# Patient Record
Sex: FEMALE | Race: BLACK OR AFRICAN AMERICAN | NOT HISPANIC OR LATINO | URBAN - METROPOLITAN AREA
[De-identification: names, ages, dates, MRNs, and addresses within clinical notes are randomized per-mention and may not be internally consistent; named-entity substitution may affect disease eponyms.]

---

## 2017-10-16 ENCOUNTER — ALLSCRIPTS OFFICE VISIT (OUTPATIENT)
Dept: OTHER | Facility: OTHER | Age: 52
End: 2017-10-16

## 2017-10-16 DIAGNOSIS — Z12.31 ENCOUNTER FOR SCREENING MAMMOGRAM FOR MALIGNANT NEOPLASM OF BREAST: ICD-10-CM

## 2017-10-24 ENCOUNTER — HOSPITAL ENCOUNTER (OUTPATIENT)
Dept: RADIOLOGY | Facility: HOSPITAL | Age: 52
Discharge: HOME | End: 2017-10-24
Payer: COMMERCIAL

## 2017-10-24 DIAGNOSIS — Z12.31 ENCOUNTER FOR SCREENING MAMMOGRAM FOR MALIGNANT NEOPLASM OF BREAST: ICD-10-CM

## 2017-10-24 PROCEDURE — G0202 SCR MAMMO BI INCL CAD: HCPCS

## 2017-10-25 ENCOUNTER — GENERIC CONVERSION - ENCOUNTER (OUTPATIENT)
Dept: OTHER | Facility: OTHER | Age: 52
End: 2017-10-25

## 2018-01-12 NOTE — RESULT NOTES
Verified Results  (1) COMPREHENSIVE METABOLIC PANEL [id number] 87:03RK [facility]     Test Name Result Flag Reference   Glucose, Serum 86 mg/dL  65-99   BUN 15 mg/dL  6-24   Creatinine, Serum 0 92 mg/dL  0 57-1 00   eGFR If NonAfricn Am 73 mL/min/1 73  >59   eGFR If Africn Am 85 mL/min/1 73  >59   BUN/Creatinine Ratio 16  9-23   ALT (SGPT) 10 IU/L  0-32   Albumin, Serum 3 9 g/dL  3 5-5 5   Globulin, Total 2 9 g/dL  1 5-4 5   A/G Ratio 1 3  1 1-2 5   Bilirubin, Total 0 7 mg/dL  0 0-1 2   Alkaline Phosphatase, S 72 IU/L     AST (SGOT) 17 IU/L  0-40   Sodium, Serum 138 mmol/L  136-144   Potassium, Serum 4 1 mmol/L  3 5-5 2   Chloride, Serum 99 mmol/L     Carbon Dioxide, Total 25 mmol/L  18-29   Calcium, Serum 9 2 mg/dL  8 7-10 2   Protein, Total, Serum 6 8 g/dL  6 0-8 5     (1) LIPID PANEL FASTING W DIRECT LDL REFLEX [id number] 09:07AM [facility]     Test Name Result Flag Reference   Cholesterol, Total 220 mg/dL H 100-199   Triglycerides 50 mg/dL  0-149   HDL Cholesterol 87 mg/dL  >39   According to ATP-III Guidelines, HDL-C >59 mg/dL is considered a  negative risk factor for CHD  LDL Cholesterol Calc 123 mg/dL H 0-99     [facility]) Cardiovascular Risk Assessment [id number] 09:07AM Lezu[facility]     Test Name Result Flag Reference   Interpretation Note     Supplement report is available  PDF Image   Discussion/Summary   Naman Esquivel,   Your total cholesterol has increased  We will discuss your lab work at your upcoming visit in December     Dr Diya Wilson

## 2018-01-13 NOTE — RESULT NOTES
Discussion/Summary   Tabitha Bonner,   Your mammogram is normal    Dr Indigo King [id number] 02:52PM [facility] Order Number: [id number]    - Patient Instructions: To schedule this appointment, please contact Central Scheduling at [phone]  Do not wear any perfume, powder, lotion or deodorant on breast or underarm area  Please bring your doctors order, referral (if needed) and insurance information with you on the day of the test  Failure to bring this information may result in this test being rescheduled  Arrive 15 minutes prior to your appointment time to register  On the day of your test, please bring any prior mammogram or breast studies with you that were not performed at a [facility]  Failure to bring prior exams may result in your test needing to be rescheduled  Test Name Result Flag Reference   MAMMO SCREENING BILATERAL W CAD (Report)     Patient History:   Patient is postmenopausal, has history of cancer in the left    breast at age 46, had previous chest radiation therapy at age 46,   had previous chemotherapy at age 46, and has history of cancer    in the left breast at age 47  Malignant excisional biopsy of the left breast, January 1, 2006  Patient's BMI is 28 3  Reason for exam: screening, asymptomatic  Screening     Mammo Screening Bilateral W CAD: October 24, 2017 - Check In #:    [de-identified]   Bilateral MLO and CC view(s) were taken  XCCL view(s) were taken   of the left breast      Technologist: Ovidio Ormond, RTRM   Prior study comparison: October 12, 2016, mammo diagnostic    bilateral W CAD performed at [place] [address]  July 9, 2015, bilateral screening mammogram performed at [place] [address]  January 31, 2014, bilateral    screening mammogram performed at [place] [address]  There are scattered fibroglandular densities   Bilateral digital mammography is performed  No dominant soft tissue mass,    unexplained architectural distortion or suspicious calcifications   are noted  The skin and nipple contours are within normal    limits  Left breast postsurgical scarring distortion is    unchanged  Right breast 6:00 nodule is unchanged  Right breast 12:00 focal    asymmetry is unchanged  No evidence of malignancy  No significant   changes when compared to prior studies  1  No evidence of malignancy  2  No significant change when compared with the prior study  ACR BI-RADSï¾® Assessments: BiRad:2 - Benign     Recommendation:   Routine screening mammogram of both breasts in 1 year  Analyzed by CAD     The patient is scheduled in a reminder system for screening    mammography  8-10% of cancers will be missed on mammography  Management of a    palpable abnormality must be based on clinical grounds  Patients   will be notified of their results via letter from our facility  Accredited by [employer] of [employer]iology and FDA  Transcription Location: [facility] Vital 98: [id number]     Risk Value(s):   Myriad Table: 26 3%, MRS : Based on personal and/or    family history, consideration of hereditary risk assessment may    be warranted     Signed by:   Jeannine Graham MD   10/25/17

## 2018-01-14 NOTE — PROGRESS NOTES
Assessment    1  Encounter for preventive health examination (V70 0) (Z00 00)   2  Never a smoker   3  Screening mammogram, encounter for (V76 12) (Z12 31)   4  Colon cancer screening (V76 51) (Z12 11)    Plan  BMI 33 0-33 9,adult    · Phentermine HCl - 37 5 MG Oral Tablet; 1 every day  Colon cancer screening    · COLONOSCOPY; Status:Active; Requested for:89Cfl0550;    · 1 - Agustín Stokes MD, Bruna Sanchez (Gastroenterology) Co-Management  *  Status: Active   Requested for: [id number]  Care Summary provided  : Yes  Screening mammogram, encounter for    · * MAMMO SCREENING BILATERAL W CAD; Status:Active; Requested for:16Oct2017;     Discussion/Summary  health maintenance visit Currently, she eats a healthy diet and has an inadequate exercise regimen  she is going to her gynecologist regularly  Breast cancer screening: mammogram has been ordered  Blood work declined, it is not covered by her insurance  She is going to return with her daughter for a flu shot  History of Present Illness  HM, Adult Female: The patient is being seen for a health maintenance evaluation  General Health: The patient's health since the last visit is described as good  Immunizations status: up to date  Lifestyle:  She consumes a diverse and healthy diet  She has weight concerns  She does not exercise regularly  She does not use tobacco    Reproductive health: the patient is postmenopausal   She is still getting hot flashes pretty much every day  Screening: Additional History:  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  Review of Systems    Constitutional: No fever, no chills, feels well, no tiredness, no recent weight gain or weight loss  ENT: no complaints of earache, no loss of hearing, no nose bleeds, no nasal discharge, no sore throat, no hoarseness     Cardiovascular: No complaints of slow heart rate, no fast heart rate, no chest pain, no palpitations, no leg claudication, no lower extremity edema  Active Problems    1  BMI 33 0-33 9,adult (V85 33) (Z68 33)   2  Encounter for screening mammogram for breast cancer (V76 12) (Z12 31)   3  History of malignant neoplasm of breast (V10 3) (Z85 3)   4  Premenopausal menorrhagia (627 0) (N92 4)    Past Medical History    · History of Blood pressure elevated (401 9) (I10)   · History of major depression (V11 8) (Z86 59)   · History of malignant neoplasm of breast (V10 3) (Z85 3)   · History of Need for immunization against influenza (V04 81) (Z23)   · History of Need for vaccination for DTP (V06 1) (Z23)   · Screening for cardiovascular condition (V81 2) (Z13 6)   · History of Screening for cardiovascular condition (V81 2) (Z13 6)   · Screening for deficiency anemia (V78 1) (Z13 0)   · Screening for diabetes mellitus (V77 1) (Z13 1)   · Screening for hyperlipidemia (V77 91) (Z13 220)   · History of Screening for hyperlipidemia (V77 91) (Z13 220)   · Screening for thyroid disorder (V77 0) (Z13 29)   · History of Screening for thyroid disorder (V77 0) (Z13 29)    Surgical History    · History of Breast Surgery Lumpectomy   · History of  Section   · History of Gynecologic Services Thermal Endometrial Ablation   · History of Radiation Therapy    Family History  Family History    · Family history of Hypertension (V17 49)    Social History    · Never a smoker    Current Meds   1  No Reported Medications Recorded   2  No Reported Medications Recorded    Allergies    1  Percocet TABS    2  Shellfish    Vitals   Recorded: [id number] 02:59PM Recorded: 55SDK7553 02:44PM   Temperature  97 F   Heart Rate  72   Respiration  16   Systolic 267 130   Diastolic 82 983   Height  5 ft 5 in   Weight  198 lb    BMI Calculated  32 95   BSA Calculated  1 97     Physical Exam    Constitutional   General appearance: No acute distress, well appearing and well nourished      Ears, Nose, Mouth, and Throat   External inspection of ears and nose: Normal     Otoscopic examination: Tympanic membranes translucent with normal light reflex  Canals patent without erythema  Oropharynx: Normal with no erythema, edema, exudate or lesions  Pulmonary   Auscultation of lungs: Clear to auscultation  Cardiovascular   Auscultation of heart: Normal rate and rhythm, normal S1 and S2, no murmurs  Abdomen   Abdomen: Non-tender, no masses  Liver and spleen: No hepatomegaly or splenomegaly  Musculoskeletal   Gait and station: Normal     Neurologic   Cortical function: Normal mental status  Psychiatric   Mood and affect: Normal        Results/Data  PHQ-2 Adult Depression Screening [id number] 02:50PM [facility]     Test Name Result Flag Reference   PHQ-2 Adult Depression Score 0     Over the last two weeks, how often have you been bothered by any of the following problems?   Little interest or pleasure in doing things: Not at all - 0  Feeling down, depressed, or hopeless: Not at all - 0   PHQ-2 Adult Depression Screening Negative         Procedure    Procedure:   Results: 20/15 in both eyes without corrective device, 20/20 in the right eye without corrective device, 20/20 in the left eye without corrective device      Signatures   Electronically signed by : Mireya Hartley DO; Oct 16 2017  5:11PM EST                       (Author)

## 2018-01-17 NOTE — PROGRESS NOTES
Assessment    1  Encounter for preventive health examination (V70 0) (Z00 00)   2  BMI 32 0-32 9,adult (V85 32) (Z68 32)   3  Depression, major (296 20) (F32 9)    Plan  BMI 32 0-32 9,adult    · Begin a limited exercise program ; Status:Complete;   Done: 24MZS7751  Depression, major    · Venlafaxine HCl ER 75 MG Oral Capsule Extended Release 24 Hour; take 1  capsule daily   · Follow-up visit in 1 month Evaluation and Treatment  Follow-up  Status: Hold For -  Scheduling  Requested for: 19GPA7511   · Psychology Follow Up Evaluation and Treatment  Follow-up  Status: Hold For -  Scheduling  Requested for: 25KSC9582  Screening for cardiovascular condition, Screening for hyperlipidemia    · (1) COMPREHENSIVE METABOLIC PANEL; Status:Active; Requested for:09Nov2016;    · (1) LIPID PANEL FASTING W DIRECT LDL REFLEX; Status:Active; Requested  for:09Nov2016;     Discussion/Summary  health maintenance visit healthy adult female Currently, she eats an adequate diet and has an inadequate exercise regimen  cervical cancer screening is current Breast cancer screening: mammogram is current  Colorectal cancer screening: colorectal cancer screening is not indicated  The immunizations are up to date  Advice and education were given regarding weight loss  Possible side effects of new medications were reviewed with the patient/guardian today  Chief Complaint  pt present for CPE  ac/cma      History of Present Illness  HM, Adult Female: The patient is being seen for a health maintenance evaluation  General Health: The patient's health since the last visit is described as good  Immunizations status: up to date  Lifestyle:  She consumes a diverse and healthy diet  She has weight concerns  She does not exercise regularly  She does not use tobacco    Reproductive health: the patient is postmenopausal    Screening:   HPI: She is working at a stressful work environment  Her  is out of work and it has been difficult   she has been gaining weight  Review of Systems    Constitutional: No fever, no chills, feels well, no tiredness, no recent weight gain or weight loss  ENT: no complaints of earache, no loss of hearing, no nose bleeds, no nasal discharge, no sore throat, no hoarseness  Cardiovascular: No complaints of slow heart rate, no fast heart rate, no chest pain, no palpitations, no leg claudication, no lower extremity edema  Respiratory: No complaints of shortness of breath, no wheezing, no cough, no SOB on exertion, no orthopnea, no PND  Musculoskeletal: No complaints of arthralgias, no myalgias, no joint swelling or stiffness, no limb pain or swelling  Active Problems    1  Encounter for screening mammogram for breast cancer (V76 12) (Z12 31)   2  History of malignant neoplasm of breast (V10 3) (Z85 3)   3  Premenopausal menorrhagia (627 0) (N92 4)    Past Medical History    · History of Blood pressure elevated (401 9) (I10)   · History of malignant neoplasm of breast (V10 3) (Z85 3)   · History of Need for immunization against influenza (V04 81) (Z23)   · History of Need for vaccination for DTP (V06 1) (Z23)   · Screening for cardiovascular condition (V81 2) (Z13 6)   · Screening for deficiency anemia (V78 1) (Z13 0)   · Screening for diabetes mellitus (V77 1) (Z13 1)   · Screening for hyperlipidemia (V77 91) (Z13 220)   · Screening for thyroid disorder (V77 0) (Z13 29)   · History of Screening for thyroid disorder (V77 0) (Z13 29)    Surgical History    · History of Breast Surgery Lumpectomy   · History of  Section   · History of Gynecologic Services Thermal Endometrial Ablation   · History of Radiation Therapy    Family History  Family History    · Family history of Hypertension (V17 49)    Social History    · Never a smoker    Current Meds   1  No Reported Medications Recorded    Allergies    1  Percocet TABS    2   Shellfish    Vitals   Recorded: 40JAU1708 51:04RU   Systolic 364   Diastolic 80   Heart Rate 76   Respiration 16   Temperature 96 4 F   Height 5 ft 5 in   Weight 197 lb    BMI Calculated 32 78   BSA Calculated 1 97     Physical Exam    Constitutional   General appearance: No acute distress, well appearing and well nourished  Ears, Nose, Mouth, and Throat   External inspection of ears and nose: Normal     Otoscopic examination: Tympanic membranes translucent with normal light reflex  Canals patent without erythema  Oropharynx: Normal with no erythema, edema, exudate or lesions  Neck   Neck: Supple, symmetric, trachea midline, no masses  Pulmonary   Auscultation of lungs: Clear to auscultation  Cardiovascular   Auscultation of heart: Normal rate and rhythm, normal S1 and S2, no murmurs  Abdomen   Abdomen: Non-tender, no masses  Liver and spleen: No hepatomegaly or splenomegaly  Musculoskeletal   Gait and station: Normal     Neurologic   Reflexes: 2+ and symmetric  Psychiatric   Judgment and insight: Normal     Orientation to person, place, and time: Normal        Results/Data  PHQ-2 Adult Depression Screening [id number] 06:41PM User, Ahs     Test Name Result Flag Reference   PHQ-2 Adult Depression Score 0     Over the last two weeks, how often have you been bothered by any of the following problems? Little interest or pleasure in doing things: Not at all - 0  Feeling down, depressed, or hopeless: Not at all - 0   PHQ-2 Adult Depression Screening Negative         Procedure    Procedure: Visual Acuity Test    Indication: routine screening  Inforrmation supplied by a Snellen chart     Results: 20/15 in both eyes with corrective device, 20/40 in the right eye with corrective device, 20/15 in the left eye with corrective device      Signatures   Electronically signed by : Marleen Sorto DO; Nov 9 2016  6:58PM EST                       (Author)

## 2018-01-22 VITALS
TEMPERATURE: 97 F | RESPIRATION RATE: 16 BRPM | HEART RATE: 72 BPM | WEIGHT: 198 LBS | DIASTOLIC BLOOD PRESSURE: 82 MMHG | SYSTOLIC BLOOD PRESSURE: 131 MMHG | BODY MASS INDEX: 32.99 KG/M2 | HEIGHT: 65 IN

## 2018-03-20 ENCOUNTER — TELEPHONE (OUTPATIENT)
Dept: FAMILY MEDICINE CLINIC | Facility: CLINIC | Age: 53
End: 2018-03-20

## 2018-03-20 DIAGNOSIS — Z13.6 SCREENING FOR CARDIOVASCULAR CONDITION: ICD-10-CM

## 2018-03-20 DIAGNOSIS — Z12.11 COLON CANCER SCREENING: Primary | ICD-10-CM

## 2018-03-20 NOTE — TELEPHONE ENCOUNTER
Patient called and stated that they have been having a problem with her family's health insurance carrier  ([employer])    She stated that they told her that they would not be covered for 2017, for labs ,routine and preventative tests etc     Now the insurance company is telling them they are covered  Osmel Cruz is requesting a list of all these preventative test and labs that she and her family should have gone for in 2017 to hold the insurance company accountable for the lapse in care  Please contact Osmel Cruz when the lists for herself, her  and daughter are complete  (I will also put this same message in her  and daughters chart )    Osmel Cruz [phone]

## 2018-03-20 NOTE — TELEPHONE ENCOUNTER
I wrote an order for lab work and order to see Dr Ambar Geller regarding colon cancer screening  Put in clerical basket  Mindy Tello, DO

## 2018-04-05 ENCOUNTER — TELEPHONE (OUTPATIENT)
Dept: FAMILY MEDICINE CLINIC | Facility: CLINIC | Age: 53
End: 2018-04-05

## 2018-06-06 ENCOUNTER — TELEPHONE (OUTPATIENT)
Dept: FAMILY MEDICINE CLINIC | Facility: CLINIC | Age: 53
End: 2018-06-06

## 2018-06-06 NOTE — TELEPHONE ENCOUNTER
Pt was a no show Monday, and I see she called 6/5/18 and rescheduled for July  The Appointment type says Physical, but notes say Follow up  I am not sure  Bee Agustin

## 2018-06-07 NOTE — TELEPHONE ENCOUNTER
She had a CPE on 10/16/17  She is not due yet for a physical  Please double check with her  She may be able to get one once a calendar year     Kamar Molina, DO

## 2018-07-26 ENCOUNTER — TELEPHONE (OUTPATIENT)
Dept: FAMILY MEDICINE CLINIC | Facility: CLINIC | Age: 53
End: 2018-07-26

## 2018-10-03 ENCOUNTER — TELEPHONE (OUTPATIENT)
Dept: FAMILY MEDICINE CLINIC | Facility: CLINIC | Age: 53
End: 2018-10-03

## 2018-10-03 NOTE — TELEPHONE ENCOUNTER
Magy Conrad has an apt next week with Dr Irma Sharpe and she wants to speak with her regarding this apt    No further details were given    Magy Conrad [phone]    Thanks

## 2018-10-03 NOTE — TELEPHONE ENCOUNTER
10/3/2018 12:21 PM Returned call to Neal Wilks she wanted of I could prescribe weight loss medication to her  I let her know since she has not been seen in almost a year that I could not write the medication for her  I need a more recent weight  She will wait till her appointment with me on the 8th to be evaluated  She also does not have insurance at this time  I let her know I cannot give her a quote on what the price to be until she is seen      Message complete  Saray Duncan, DO

## 2018-12-03 ENCOUNTER — OFFICE VISIT (OUTPATIENT)
Dept: FAMILY MEDICINE CLINIC | Facility: CLINIC | Age: 53
End: 2018-12-03
Payer: COMMERCIAL

## 2018-12-03 VITALS
DIASTOLIC BLOOD PRESSURE: 80 MMHG | HEIGHT: 65 IN | SYSTOLIC BLOOD PRESSURE: 150 MMHG | WEIGHT: 209 LBS | TEMPERATURE: 97.4 F | BODY MASS INDEX: 34.82 KG/M2 | HEART RATE: 56 BPM | RESPIRATION RATE: 16 BRPM

## 2018-12-03 DIAGNOSIS — E66.09 CLASS 1 OBESITY DUE TO EXCESS CALORIES WITHOUT SERIOUS COMORBIDITY WITH BODY MASS INDEX (BMI) OF 34.0 TO 34.9 IN ADULT: Primary | ICD-10-CM

## 2018-12-03 DIAGNOSIS — R03.0 ELEVATED BLOOD PRESSURE READING: ICD-10-CM

## 2018-12-03 DIAGNOSIS — Z13.6 SCREENING FOR CARDIOVASCULAR CONDITION: ICD-10-CM

## 2018-12-03 PROBLEM — E66.811 CLASS 1 OBESITY DUE TO EXCESS CALORIES WITHOUT SERIOUS COMORBIDITY WITH BODY MASS INDEX (BMI) OF 34.0 TO 34.9 IN ADULT: Status: ACTIVE | Noted: 2018-12-03

## 2018-12-03 PROCEDURE — 99213 OFFICE O/P EST LOW 20 MIN: CPT | Performed by: FAMILY MEDICINE

## 2018-12-03 NOTE — PROGRESS NOTES
Assessment/Plan:    Problem List Items Addressed This Visit     Class 1 obesity due to excess calories without serious comorbidity with body mass index (BMI) of 34 0 to 34 9 in adult - Primary     Not controlled, weight is going up  She is going to start going to the [facility] gym          Elevated blood pressure reading     New, will recheck pressure in 6 weeks    Life style modification discussed         Relevant Orders    CBC    TSH, 3rd generation      Other Visit Diagnoses     Screening for cardiovascular condition        Relevant Orders    Comprehensive metabolic panel    Lipid Panel with Direct LDL reflex        Declined mammogram and colonoscopy  due to insurance issues  BMI Counseling: Body mass index is 34 78 kg/m²  Discussed with patient's BMI with her  The BMI is above average  BMI counseling and education was provided to the patient  Nutrition recommendations include moderation in carbohydrate intake  Exercise recommendations include exercising 3-5 times per week  Patient Instructions   Low salt diet and exercise advised  Return in about 6 weeks (around 1/14/2019) for Next scheduled follow up  Subjective:      Patient ID: Anai Dennis is a 48 y o  female  Chief Complaint   Patient presents with    Follow-up     [facility]       She likes her new job in [employer]  She likes the new position  The following portions of the patient's history were reviewed and updated as appropriate:  past social history    Review of Systems   Respiratory: Negative  Cardiovascular: Negative  Current Outpatient Prescriptions   Medication Sig Dispense Refill    naproxen (NAPROSYN) 500 mg tablet Take 1 tablet by mouth 2 (two) times a day with meals for 30 days 30 tablet 0     No current facility-administered medications for this visit          Objective:    /80   Pulse 56   Temp (!) 97 4 °F (36 3 °C)   Resp 16   Ht 5' 5" (1 651 m)   Wt 94 8 kg (209 lb)   BMI 34 78 kg/m² Physical Exam   Constitutional: She appears well-developed and well-nourished  HENT:   Head: Normocephalic and atraumatic  Right Ear: External ear normal    Left Ear: External ear normal    Mouth/Throat: Oropharynx is clear and moist    Cardiovascular: Normal rate, regular rhythm and normal heart sounds  Exam reveals no friction rub  No murmur heard  Pulmonary/Chest: Effort normal and breath sounds normal  No respiratory distress  She has no wheezes  She has no rales  Musculoskeletal: She exhibits no edema or deformity  Nursing note and vitals reviewed               Tavo Michaud DO

## 2019-01-14 ENCOUNTER — OFFICE VISIT (OUTPATIENT)
Dept: FAMILY MEDICINE CLINIC | Facility: CLINIC | Age: 54
End: 2019-01-14
Payer: COMMERCIAL

## 2019-01-14 VITALS
DIASTOLIC BLOOD PRESSURE: 80 MMHG | HEART RATE: 66 BPM | WEIGHT: 206 LBS | BODY MASS INDEX: 34.32 KG/M2 | TEMPERATURE: 97.4 F | HEIGHT: 65 IN | SYSTOLIC BLOOD PRESSURE: 146 MMHG | RESPIRATION RATE: 18 BRPM

## 2019-01-14 DIAGNOSIS — E66.09 CLASS 1 OBESITY DUE TO EXCESS CALORIES WITHOUT SERIOUS COMORBIDITY WITH BODY MASS INDEX (BMI) OF 34.0 TO 34.9 IN ADULT: Primary | ICD-10-CM

## 2019-01-14 DIAGNOSIS — Z12.11 COLON CANCER SCREENING: ICD-10-CM

## 2019-01-14 DIAGNOSIS — Z12.31 SCREENING MAMMOGRAM, ENCOUNTER FOR: ICD-10-CM

## 2019-01-14 DIAGNOSIS — Z13.6 SCREENING FOR CARDIOVASCULAR CONDITION: ICD-10-CM

## 2019-01-14 DIAGNOSIS — R03.0 ELEVATED BLOOD PRESSURE READING: ICD-10-CM

## 2019-01-14 PROCEDURE — 99213 OFFICE O/P EST LOW 20 MIN: CPT | Performed by: FAMILY MEDICINE

## 2019-01-14 NOTE — PROGRESS NOTES
Assessment/Plan:    Problem List Items Addressed This Visit     Class 1 obesity due to excess calories without serious comorbidity with body mass index (BMI) of 34 0 to 34 9 in adult - Primary     Improving,  She is down 3 pounds         Elevated blood pressure reading     Improving with diet and weight loss  Will recheck in 3 months         Relevant Orders    CBC    TSH, 3rd generation      Other Visit Diagnoses     Screening mammogram, encounter for        Relevant Orders    Mammo screening bilateral w cad    Colon cancer screening        she is going to check with her insurance for coverage  Relevant Orders    Ambulatory referral to Gastroenterology    Screening for cardiovascular condition        Relevant Orders    Comprehensive metabolic panel    Lipid Panel with Direct LDL reflex        BMI Counseling: Body mass index is 34 28 kg/m²  Discussed with patient's BMI with her  The BMI is above average  BMI counseling and education was provided to the patient  Nutrition recommendations include 3-5 servings of fruits/vegetables daily, consuming healthier snacks, decreasing soda and/or juice intake and moderation in carbohydrate intake  Patient Instructions   Check into coverage for [employer] when talking to your insurance  Return in about 3 months (around 4/14/2019) for Next scheduled follow up - blood pressure check  Subjective:      Patient ID: Joseph Alberto is a 48 y o  female  Chief Complaint   Patient presents with    Follow-up     medication review  [facility]       She has cut out salt and junk food  She is feeling better  The following portions of the patient's history were reviewed and updated as appropriate:  past social history    Review of Systems   Respiratory: Negative  Cardiovascular: Negative  No current outpatient prescriptions on file  No current facility-administered medications for this visit          Objective:    /80   Pulse 66   Temp (!) 97 4 °F (36 3 °C)   Resp 18   Ht 5' 5" (1 651 m)   Wt 93 4 kg (206 lb)   BMI 34 28 kg/m²        Physical Exam   Constitutional: She appears well-developed and well-nourished  HENT:   Head: Normocephalic and atraumatic  Right Ear: External ear normal    Left Ear: External ear normal    Mouth/Throat: Oropharynx is clear and moist    Cardiovascular: Normal rate, regular rhythm and normal heart sounds  Exam reveals no friction rub  No murmur heard  Pulmonary/Chest: Effort normal and breath sounds normal  No respiratory distress  She has no wheezes  She has no rales  Musculoskeletal: She exhibits no edema or deformity  Nursing note and vitals reviewed               Yoli Vasques DO

## 2019-01-15 ENCOUNTER — OFFICE VISIT (OUTPATIENT)
Dept: URGENT CARE | Facility: CLINIC | Age: 54
End: 2019-01-15
Payer: COMMERCIAL

## 2019-01-15 VITALS
HEART RATE: 68 BPM | BODY MASS INDEX: 33.32 KG/M2 | WEIGHT: 200 LBS | OXYGEN SATURATION: 98 % | DIASTOLIC BLOOD PRESSURE: 100 MMHG | HEIGHT: 65 IN | TEMPERATURE: 97.3 F | RESPIRATION RATE: 18 BRPM | SYSTOLIC BLOOD PRESSURE: 150 MMHG
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
## 2019-01-15 DIAGNOSIS — S61.011A LACERATION OF RIGHT THUMB, INITIAL ENCOUNTER: Primary | ICD-10-CM

## 2019-01-15 PROCEDURE — 99213 OFFICE O/P EST LOW 20 MIN: CPT | Performed by: PHYSICIAN ASSISTANT

## 2019-01-15 PROCEDURE — 12001 RPR S/N/AX/GEN/TRNK 2.5CM/<: CPT | Performed by: PHYSICIAN ASSISTANT

## 2019-01-16 NOTE — PROGRESS NOTES
[address] [facility]        NAME: Chau Mauricio is a 48 y o  female  : 1965    MRN: [id number]  DATE: 2019  TIME: 5:52 PM    Assessment and Plan   Laceration of right thumb, initial encounter [S61 011A]  1  Laceration of right thumb, initial encounter  Laceration repair   2  Injury of unknown intent by sharp instrument, initial encounter  Laceration repair         Patient Instructions     Discussed condition with pt  She is to keep right thumb clean, dressed, and dry and monitor for any signs of local infection  Should be seen in 10 days for suture removal    Follow up with PCP in 3-5 days  Proceed to  ER if symptoms worsen  Chief Complaint     Chief Complaint   Patient presents with    Laceration     Sliced tip of R thumb tonight using mandolin slicer  last 2015  History of Present Illness       Pt presents after lacerating her distal right thumb shortly prior to arrival while using a mandolin slicer at home  She reports it was brand new and was clean  Her tetanus status is UTD  Denies loss of sensation or function  Denies FB  Denies hx of bleeding/clotting disorders and is not on any anticoagulants  Review of Systems   Review of Systems   Constitutional: Negative  Respiratory: Negative  Cardiovascular: Negative  Gastrointestinal: Negative  Genitourinary: Negative  Musculoskeletal: Negative  Skin: Positive for wound (Laceration distal right thumb )  Neurological: Negative            Current Medications       Current Outpatient Prescriptions:     Multiple Vitamins-Minerals (MULTIVITAMIN GUMMIES ADULTS PO), Take by mouth daily, Disp: , Rfl:     Current Allergies     Allergies as of 01/15/2019 - Reviewed 01/15/2019   Allergen Reaction Noted    Percocet [oxycodone-acetaminophen] Rash 2016    Percolone [oxycodone] Rash 2016    Shellfish-derived products Rash 2015            The following portions of the patient's history were reviewed and updated as appropriate: allergies, current medications, past family history, past medical history, past social history, past surgical history and problem list      Past Medical History:   Diagnosis Date    Cancer ([facility] [address] )     L breast    Elevated blood pressure reading     Major depression     Malignant neoplasm of breast ([facility] [address] )        Past Surgical History:   Procedure Laterality Date    BREAST LUMPECTOMY       SECTION      ENDOMETRIAL ABLATION      INTRAOPERATIVE RADIATION THERAPY ([facility])      last assessed: 7/17/15       Family History   Problem Relation Age of Onset    Hypertension Family     Hypertension Mother     Hypertension Father     ADD / ADHD Daughter          Medications have been verified  Objective   /100 (BP Location: Left arm, Patient Position: Sitting, Cuff Size: Standard)   Pulse 68   Temp (!) 97 3 °F (36 3 °C) (Tympanic)   Resp 18   Ht 5' 5" (1 651 m)   Wt 90 7 kg (200 lb)   SpO2 98%   BMI 33 28 kg/m²        Physical Exam     Physical Exam   Constitutional: She is oriented to person, place, and time  She appears well-developed and well-nourished  No distress  Musculoskeletal: Normal range of motion  Neurological: She is alert and oriented to person, place, and time  No sensory deficit  Skin:   2 cm transverse laceration right distal thumb palmar aspect present  No FB noted  Psychiatric: She has a normal mood and affect  Vitals reviewed  Laceration repair  Date/Time: 1/15/2019 7:45 PM  Performed by: Misti Zavala  Authorized by: Misti Zavala   Consent: Verbal consent obtained    Risks and benefits: risks, benefits and alternatives were discussed  Consent given by: patient  Patient understanding: patient states understanding of the procedure being performed  Body area: upper extremity  Location details: right thumb  Laceration length: 2 cm  Foreign bodies: no foreign bodies  Tendon involvement: none  Nerve involvement: none  Vascular damage: no  Anesthesia: local infiltration    Anesthesia:  Local Anesthetic: lidocaine 2% without epinephrine  Anesthetic total: 2 mL    Sedation:  Patient sedated: no      Procedure Details:  Preparation: Patient was prepped and draped in the usual sterile fashion    Irrigation solution: saline  Debridement: none  Degree of undermining: none  Skin closure: 4-0 nylon  Number of sutures: 5  Technique: simple  Approximation: close  Approximation difficulty: simple  Dressing: gauze roll and antibiotic ointment (Telfa)  Patient tolerance: Patient tolerated the procedure well with no immediate complications

## 2019-01-22 LAB
ALBUMIN SERPL-MCNC: 3.9 G/DL (ref 3.5–5.5)
ALBUMIN/GLOB SERPL: 1.2 {RATIO} (ref 1.2–2.2)
ALP SERPL-CCNC: 83 IU/L (ref 39–117)
ALT SERPL-CCNC: 13 IU/L (ref 0–32)
AST SERPL-CCNC: 19 IU/L (ref 0–40)
BASOPHILS # BLD AUTO: 0 X10E3/UL (ref 0–0.2)
BASOPHILS NFR BLD AUTO: 1 %
BILIRUB SERPL-MCNC: 0.3 MG/DL (ref 0–1.2)
BUN SERPL-MCNC: 16 MG/DL (ref 6–24)
BUN/CREAT SERPL: 18 (ref 9–23)
CALCIUM SERPL-MCNC: 9.2 MG/DL (ref 8.7–10.2)
CHLORIDE SERPL-SCNC: 102 MMOL/L (ref 96–106)
CHOLEST SERPL-MCNC: 196 MG/DL (ref 100–199)
CO2 SERPL-SCNC: 24 MMOL/L (ref 20–29)
CREAT SERPL-MCNC: 0.9 MG/DL (ref 0.57–1)
EOSINOPHIL # BLD AUTO: 0.2 X10E3/UL (ref 0–0.4)
EOSINOPHIL NFR BLD AUTO: 4 %
ERYTHROCYTE [DISTWIDTH] IN BLOOD BY AUTOMATED COUNT: 12.9 % (ref 12.3–15.4)
GLOBULIN SER-MCNC: 3.2 G/DL (ref 1.5–4.5)
GLUCOSE SERPL-MCNC: 93 MG/DL (ref 65–99)
HCT VFR BLD AUTO: 37.4 % (ref 34–46.6)
HDLC SERPL-MCNC: 73 MG/DL
HGB BLD-MCNC: 12.8 G/DL (ref 11.1–15.9)
IMM GRANULOCYTES # BLD: 0 X10E3/UL (ref 0–0.1)
IMM GRANULOCYTES NFR BLD: 0 %
LABCORP COMMENT: NORMAL
LDLC SERPL CALC-MCNC: 108 MG/DL (ref 0–99)
LYMPHOCYTES # BLD AUTO: 1.9 X10E3/UL (ref 0.7–3.1)
LYMPHOCYTES NFR BLD AUTO: 35 %
MCH RBC QN AUTO: 28.3 PG (ref 26.6–33)
MCHC RBC AUTO-ENTMCNC: 34.2 G/DL (ref 31.5–35.7)
MCV RBC AUTO: 83 FL (ref 79–97)
MICRODELETION SYND BLD/T FISH: NORMAL
MONOCYTES # BLD AUTO: 0.4 X10E3/UL (ref 0.1–0.9)
MONOCYTES NFR BLD AUTO: 7 %
NEUTROPHILS # BLD AUTO: 2.8 X10E3/UL (ref 1.4–7)
NEUTROPHILS NFR BLD AUTO: 53 %
PLATELET # BLD AUTO: 182 X10E3/UL (ref 150–379)
POTASSIUM SERPL-SCNC: 4.2 MMOL/L (ref 3.5–5.2)
PROT SERPL-MCNC: 7.1 G/DL (ref 6–8.5)
RBC # BLD AUTO: 4.52 X10E6/UL (ref 3.77–5.28)
SL AMB EGFR AFRICAN AMERICAN: 86 ML/MIN/1.73
SL AMB EGFR NON AFRICAN AMERICAN: 74 ML/MIN/1.73
SODIUM SERPL-SCNC: 142 MMOL/L (ref 134–144)
TRIGL SERPL-MCNC: 73 MG/DL (ref 0–149)
TSH SERPL DL<=0.005 MIU/L-ACNC: 2 UIU/ML (ref 0.45–4.5)
WBC # BLD AUTO: 5.4 X10E3/UL (ref 3.4–10.8)

## 2019-01-23 ENCOUNTER — HOSPITAL ENCOUNTER (OUTPATIENT)
Dept: RADIOLOGY | Facility: HOSPITAL | Age: 54
Discharge: HOME | End: 2019-01-23
Payer: COMMERCIAL

## 2019-01-23 VITALS — BODY MASS INDEX: 34.32 KG/M2 | WEIGHT: 206 LBS | HEIGHT: 65 IN

## 2019-01-23 DIAGNOSIS — Z12.31 SCREENING MAMMOGRAM, ENCOUNTER FOR: ICD-10-CM

## 2019-01-23 PROCEDURE — 77063 BREAST TOMOSYNTHESIS BI: CPT

## 2019-01-23 PROCEDURE — 77067 SCR MAMMO BI INCL CAD: CPT

## 2019-01-25 ENCOUNTER — OFFICE VISIT (OUTPATIENT)
Dept: URGENT CARE | Facility: CLINIC | Age: 54
End: 2019-01-25
Payer: COMMERCIAL

## 2019-01-25 VITALS
TEMPERATURE: 96.7 F | SYSTOLIC BLOOD PRESSURE: 148 MMHG | HEIGHT: 65 IN | RESPIRATION RATE: 16 BRPM | OXYGEN SATURATION: 98 % | HEART RATE: 65 BPM | WEIGHT: 200 LBS | BODY MASS INDEX: 33.32 KG/M2 | DIASTOLIC BLOOD PRESSURE: 94 MMHG

## 2019-01-25 DIAGNOSIS — Z48.02 ENCOUNTER FOR REMOVAL OF SUTURES: Primary | ICD-10-CM

## 2019-01-25 PROCEDURE — 99213 OFFICE O/P EST LOW 20 MIN: CPT | Performed by: PHYSICIAN ASSISTANT

## 2019-01-25 NOTE — PATIENT INSTRUCTIONS
Continue to keep your skin clean washing with soap and water  Pat dry  You may apply a topical antibiotic ointment such as Neosporin  This type of ointment may also help with scar formation  Monitor for signs of infection including increasing redness, swelling, streaking redness, pus formation or drainage, fevers, chills, or body aches  Seek care immediately if these symptoms occur  Follow up with your family doctor 3-5 days  Proceed to the ER if symptoms worsen

## 2019-01-25 NOTE — PROGRESS NOTES
[address] [facility]        NAME: Adalberto Grant is a 48 y o  female  : 1965    MRN: [id number]  DATE: 2019  TIME: 6:27 PM    Assessment and Plan   Encounter for removal of sutures [Z48 02]  1  Encounter for removal of sutures  Suture removal     Patient Instructions   Continue to keep your skin clean washing with soap and water  Pat dry  You may apply a topical antibiotic ointment such as Neosporin  This type of ointment may also help with scar formation  Monitor for signs of infection including increasing redness, swelling, streaking redness, pus formation or drainage, fevers, chills, or body aches  Seek care immediately if these symptoms occur  Follow up with your family doctor 3-5 days  Proceed to the ER if symptoms worsen  Chief Complaint     Chief Complaint   Patient presents with    Suture / Staple Removal     Pt states she was seen 10 days ago for laceration  Needs suture removal     History of Present Illness   65-year-old female presenting for suture removal   Patient notes she had sutures placed 10 days ago in this office due to laceration of the right thumb  She has been keeping the area clean washing with soap and water and applying Neosporin ointment  She denies any signs of infection  No fevers, chills, sweats, generalized body aches, or changes in skin surrounding the wound  Review of Systems   Review of Systems   Constitutional: Negative for chills, diaphoresis, fatigue and fever  Respiratory: Negative for cough and stridor  Cardiovascular: Negative for chest pain  Gastrointestinal: Negative for abdominal pain  Musculoskeletal: Negative for arthralgias and myalgias  Skin: Negative for color change and rash  Neurological: Negative for dizziness, light-headedness and headaches           Current Medications       Current Outpatient Prescriptions:     Multiple Vitamins-Minerals (MULTIVITAMIN GUMMIES ADULTS PO), Take by mouth daily, Disp: , Rfl: Current Allergies     Allergies as of 2019 - Reviewed 2019   Allergen Reaction Noted    Percocet [oxycodone-acetaminophen] Rash 2016    Percolone [oxycodone] Rash 2016    Shellfish-derived products Rash 2015            The following portions of the patient's history were reviewed and updated as appropriate: allergies, current medications, past family history, past medical history, past social history, past surgical history and problem list      Past Medical History:   Diagnosis Date    Cancer ([facility] )     L breast    Elevated blood pressure reading     History of chemotherapy     History of radiation therapy     Major depression     Malignant neoplasm of breast ([facility] )        Past Surgical History:   Procedure Laterality Date    BREAST LUMPECTOMY Left      SECTION      ENDOMETRIAL ABLATION      INTRAOPERATIVE RADIATION THERAPY ([facility])      last assessed: 7/17/15       Family History   Problem Relation Age of Onset    Hypertension Family     Hypertension Mother     Hypertension Father     ADD / ADHD Daughter          Medications have been verified  Objective   /94   Pulse 65   Temp (!) 96 7 °F (35 9 °C) (Tympanic)   Resp 16   Ht 5' 5" (1 651 m)   Wt 90 7 kg (200 lb)   SpO2 98%   BMI 33 28 kg/m²          Physical Exam     Physical Exam   Constitutional: She is oriented to person, place, and time  She appears well-developed and well-nourished  No distress  HENT:   Head: Normocephalic and atraumatic  Eyes: Conjunctivae are normal    Cardiovascular: Normal rate, regular rhythm and normal heart sounds  Pulmonary/Chest: Effort normal and breath sounds normal  No respiratory distress  She has no decreased breath sounds  She has no wheezes  She has no rhonchi  She has no rales  Neurological: She is alert and oriented to person, place, and time  No cranial nerve deficit  She exhibits normal muscle tone   Coordination normal    Skin: Skin is warm and dry  No rash noted  She is not diaphoretic  No erythema  No pallor  Five sutures of the palmar aspect of the right thumb  Laceration appears well healed with no crusting, pus formation or drainage, surrounding erythema, edema, or signs of secondary infection  Area is slightly TTP  Psychiatric: She has a normal mood and affect  Her behavior is normal    Nursing note and vitals reviewed  Suture removal  Date/Time: 1/25/2019 6:26 PM  Performed by: Trinity Romano  Authorized by: Trinity Romano     Patient location:  Clinic  Consent:     Consent obtained:  Verbal    Consent given by:  Patient  Universal protocol:     Patient identity confirmed:  Verbally with patient  Location:     Laterality:  Right    Location:  Upper extremity    Upper extremity location:  Hand    Hand location:  R thumb  Procedure details: Tools used:  Suture removal kit    Wound appearance:  No sign(s) of infection, clean, good wound healing and tender    Number of sutures removed:  5  Post-procedure details:     Post-removal:  No dressing applied    Patient tolerance of procedure:   Tolerated well, no immediate complications

## 2019-01-30 ENCOUNTER — TELEPHONE (OUTPATIENT)
Dept: GASTROENTEROLOGY | Facility: CLINIC | Age: 54
End: 2019-01-30

## 2019-01-30 NOTE — TELEPHONE ENCOUNTER
Deangelo Rebollar  1965  [id number]  [phone]  Cell Phone [phone]  Screened by: [  ]    Referring Dr :     Pre- Screening:   Has patient been referred for a routine screening Colonoscopy? no  Is the patient over 48years of age? no    If the answer is YES to both questions, proceed to the medical questions  Do you have any of the following symptoms? NO        Have you had a coronary or vascular stent within the last year? no    Have you had a heart attack or stroke in the last 6 months? no    Have you had intestinal surgery in the last 3 months? no    Do you have problems with:sleep apnea  no    Do you use:  Oxygen no  CPAP/BiPAP no    Have you been hospitalized in the last Month? no    Have you been diagnosed with a bleeding disorder or anemia? no    Have you had chest pain (angina) or breathing problems  (COPD) in the last 3 months? no     Do you have any difficulty walking up a flight of stairs? no    Have you had Kidney failure or insufficiency? no    Have you had heart valve surgery? no    Are you confined to a wheelchair? no        Do you take insulin for Diabetes no  Name of medication:    : If patient answers NO to medical questions, then schedule procedure  If patient answers YES to medical questions, then schedule office appointment  Previous Colonoscopy no   (if yes) Date and Facility of last colonoscopy? Patient scheduled for procedure:   Scheduled by:     Time:   Provider:   Location:     Insurance:   Referral Required? Were instructions Mailed? Were instructions sent to [employer]:   Was the prep sent to Pharmacy?      Comments: [ PASSED OA---REQ  FEB 25 OR MAR 1  IF POSSIBLE WITH DR Samira Love ]

## 2019-01-31 ENCOUNTER — TELEPHONE (OUTPATIENT)
Dept: FAMILY MEDICINE CLINIC | Facility: CLINIC | Age: 54
End: 2019-01-31

## 2019-01-31 NOTE — TELEPHONE ENCOUNTER
Pt would like Dr Dwayne Martinez to call her back tomorrow when she comes in  She has a couple of questions about her upcoming apt for a colonoscopy

## 2019-02-01 DIAGNOSIS — Z12.11 COLON CANCER SCREENING: Primary | ICD-10-CM

## 2019-02-01 NOTE — TELEPHONE ENCOUNTER
Pt is scheduled with Otto Sorensen 2/15/2019  Please send Suprep to [facility] pharmacy on file

## 2019-02-01 NOTE — TELEPHONE ENCOUNTER
2/1/2019 7:53 AM Returned call to Megan Shah  Her insurance said that they cover screening colonoscopy  She wanted to make sure that is was okay to wait until March 1st to see the GI specialist and I told her that was fine      Message complete  Fernando Flowers, DO

## 2019-02-13 ENCOUNTER — TELEPHONE (OUTPATIENT)
Dept: FAMILY MEDICINE CLINIC | Facility: CLINIC | Age: 54
End: 2019-02-13

## 2019-02-13 NOTE — TELEPHONE ENCOUNTER
Dev Pendleton would like you to call her when you get in tomorrow morning  Armand Powers it was regarding her colonoscopy

## 2019-02-14 NOTE — TELEPHONE ENCOUNTER
2/14/2019 11:32 AM Returned call to Geovanna  She is having insurance getting coverage for her colonoscopy  She would like to know if there are other screening options      She is going to check with her insurance for coverage of Cologuard and get back to me    Message complete  Samra Sal, DO

## 2019-02-22 ENCOUNTER — OFFICE VISIT (OUTPATIENT)
Dept: URGENT CARE | Facility: CLINIC | Age: 54
End: 2019-02-22
Payer: COMMERCIAL

## 2019-02-22 VITALS
WEIGHT: 211 LBS | HEART RATE: 70 BPM | HEIGHT: 65 IN | BODY MASS INDEX: 35.16 KG/M2 | SYSTOLIC BLOOD PRESSURE: 160 MMHG | TEMPERATURE: 97.8 F | RESPIRATION RATE: 16 BRPM | DIASTOLIC BLOOD PRESSURE: 90 MMHG | OXYGEN SATURATION: 99 %

## 2019-02-22 DIAGNOSIS — S49.91XA INJURY OF RIGHT SHOULDER, INITIAL ENCOUNTER: Primary | ICD-10-CM

## 2019-02-22 DIAGNOSIS — S16.1XXA STRAIN OF MUSCLE, FASCIA AND TENDON AT NECK LEVEL, INITIAL ENCOUNTER: ICD-10-CM

## 2019-02-22 DIAGNOSIS — R51.9 HEADACHE, UNSPECIFIED HEADACHE TYPE: ICD-10-CM

## 2019-02-22 DIAGNOSIS — R03.0 ELEVATED BLOOD PRESSURE READING: ICD-10-CM
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
## 2019-02-22 PROCEDURE — 99213 OFFICE O/P EST LOW 20 MIN: CPT | Performed by: PHYSICIAN ASSISTANT

## 2019-02-22 NOTE — PROGRESS NOTES
[address] [facility]        NAME: Abe Clement is a 48 y o  female  : 1965    MRN: [id number]  DATE: 2019  TIME: 4:24 PM     Assessment and Plan   Injury of right shoulder, initial encounter [S49 91XA]  1  Injury of right shoulder, initial encounter     2  Strain of muscle, fascia and tendon at neck level, initial encounter     3  Headache, unspecified headache type     4  Fall down stairs, initial encounter     5  Elevated blood pressure reading           Patient Instructions     Discussed pt's condition with her  She appears to have a right posterior shoulder/mid back strain as well as a cervical strain from a fall down stairs  I rec rest, ice, stretching, Tylenol/NSAIDs  Her neuro exam is intact  Her headache could be due to neck pain and whiplash but could also be due to elevated BP which she has now presented here with on multiple occasions  I counseled her on what to avoid in her diet/lifestyle to help lower and that she needs to promptly F/U with PCP to have this worked up and managed further  Follow up with PCP in 3-5 days  Proceed to  ER if symptoms worsen  Chief Complaint     Chief Complaint   Patient presents with    Fall     fell down flight of carpeted stair went down on her bum now has right shoulder pain and having left sided headache just tringes          History of Present Illness       Pt presents after falling down multiple carpeted steps in her home two days ago  She reports she fell backwards onto her bottom with her feet up and she felt startled  Felt soreness in her right shoulder  Denies head trauma or LOC  Concerned as yesterday she started noticing some twinges of pain in her head  Denies nosebleeds, nausea, dizziness, photophobia, visual changes  She does have some mild neck discomfort  She has been applying ice and topical Icy Hot as well as taking Aleve PO  Review of Systems   Review of Systems   Constitutional: Negative  HENT: Negative      Eyes: Negative  Respiratory: Negative  Cardiovascular: Negative  Gastrointestinal: Negative  Genitourinary: Negative  Musculoskeletal: Positive for neck pain  Right shoulder pain S/P fall   Neurological: Positive for headaches  Negative for dizziness and light-headedness  Current Medications       Current Outpatient Medications:     Multiple Vitamins-Minerals (MULTIVITAMIN GUMMIES ADULTS PO), Take by mouth daily, Disp: , Rfl:     Na Sulfate-K Sulfate-Mg Sulf (SUPREP BOWEL PREP KIT) 17 5-3 13-1 6 GM/177ML SOLN, Take 2 Bottles by mouth see administration instructions Suprep bowel prep  Please follow the instructions from the office, Disp: 2 Bottle, Rfl: 0    Current Allergies     Allergies as of 2019 - Reviewed 2019   Allergen Reaction Noted    Percocet [oxycodone-acetaminophen] Rash 2016    Percolone [oxycodone] Rash 2016    Shellfish-derived products Rash 2015            The following portions of the patient's history were reviewed and updated as appropriate: allergies, current medications, past family history, past medical history, past social history, past surgical history and problem list      Past Medical History:   Diagnosis Date    Cancer ([facility] [address] )     L breast    Elevated blood pressure reading     History of chemotherapy     History of radiation therapy     Major depression     Malignant neoplasm of breast ([facility] [address] )        Past Surgical History:   Procedure Laterality Date    BREAST LUMPECTOMY Left      SECTION      ENDOMETRIAL ABLATION      INTRAOPERATIVE RADIATION THERAPY ([facility])      last assessed: 7/17/15       Family History   Problem Relation Age of Onset    Hypertension Family     Hypertension Mother     Hypertension Father     ADD / ADHD Daughter          Medications have been verified          Objective   /90   Pulse 70   Temp 97 8 °F (36 6 °C)   Resp 16   Ht 5' 5" (1 651 m)   Wt 95 7 kg (211 lb)   SpO2 99% BMI 35 11 kg/m²        Physical Exam     Physical Exam   Constitutional: She is oriented to person, place, and time  She appears well-developed and well-nourished  No distress  HENT:   Head: Normocephalic  Eyes: Pupils are equal, round, and reactive to light  EOM are normal    Neck: Neck supple  TTP noted over the cervical paraspinals B/L  No midline tenderness  PROM of the C-spine intact with slight difficulty  Cardiovascular: Normal rate, regular rhythm and normal heart sounds  No murmur heard  Pulmonary/Chest: Effort normal and breath sounds normal  No respiratory distress  Musculoskeletal:   Right shoulder joint with no TTP and ROM is intact without difficulty  No crepitus or sign of instability noted  There is tenderness over the right mid back just inferior to the posterior aspect of the right shoulder joint  Neurological: She is alert and oriented to person, place, and time  She has normal strength and normal reflexes  No cranial nerve deficit  Coordination and gait normal    Psychiatric: She has a normal mood and affect  Vitals reviewed

## 2019-02-22 NOTE — PATIENT INSTRUCTIONS
Shoulder Sprain   WHAT YOU NEED TO KNOW:   A shoulder sprain happens when a ligament in your shoulder is stretched or torn  Ligaments are the tough tissues that connect bones  Ligaments allow you to lift, lower, and rotate your arm  DISCHARGE INSTRUCTIONS:   Return to the emergency department if:   · You are short of breath  · Your throat feels tight, or you have trouble swallowing  · You feel sudden, sharp chest pain on the same side as your injury  · Your skin feels cold or clammy  Contact your healthcare provider if:   · The skin on your injured shoulder looks blue or pale  · You have new or increased swelling and pain in your shoulder  · You have new or increased stiffness when you move your injured shoulder  · You have questions or concerns about your condition or care  Medicines:   · Prescription pain medicine  may be given  Ask how to take this medicine safely  · Take your medicine as directed  Contact your healthcare provider if you think your medicine is not helping or if you have side effects  Tell him or her if you are allergic to any medicine  Keep a list of the medicines, vitamins, and herbs you take  Include the amounts, and when and why you take them  Bring the list or the pill bottles to follow-up visits  Carry your medicine list with you in case of an emergency  Follow up with your healthcare provider as directed:  Write down your questions so you remember to ask them during your visits  Self-care:   · Rest  your shoulder so it can heal  Avoid moving your shoulder as your injury heals  This will help decrease the risk of more damage to your shoulder  · Apply ice  on your shoulder for 15 to 20 minutes every hour or as directed  Use an ice pack, or put crushed ice in a plastic bag  Cover it with a towel  Ice helps prevent tissue damage and decreases swelling and pain  · Compress your shoulder as directed   Compression provides support and helps decrease swelling and movement so your shoulder can heal  For mild sprains, you may be given a sling to support your arm  You may need a padded brace or a plaster cast to hold your shoulder in place if the sprain is more serious  How to wear a brace, sling, or splint:  A brace, sling, or splint may be needed to limit your movement and protect your injured shoulder  · Wear your brace, sling, or splint as directed  You may need to wear it all the time and take it off only to bathe or do exercises  Ask your healthcare provider how long you should wear it  · Keep your skin clean and dry  Padding under your armpit will help absorb sweat and prevent sores on your skin  · Do not hunch your shoulders  This may cause pain  Keep your shoulders relaxed  · Position the sling over your arm and hand so that it also covers your knuckles  This will help the sling support your wrist and hand  Position your wrist higher than your elbow  Your wrist may start to hurt or go numb if your sling is too short  Exercise your shoulder:  After you rest your shoulder for 3 to 7 days, you will need to do light exercises to decrease shoulder stiffness  Check with your healthcare provider before you return to your normal activities or sports  Prevent another injury:  You can hurt your shoulder again if you stop treatment too soon  The following may decrease your risk for sprains:  · Do not exercise when you are tired or in pain  Warm up and stretch before you exercise  · Wear equipment to protect yourself when you play sports  · Wear shoes that fit well and run on flat surfaces to prevent falls  © 2017 [employer] Information is for End User's use only and may not be sold, redistributed or otherwise used for commercial purposes  All illustrations and images included in CareNotes® are the copyrighted property of [employer]  or [employer]  The above information is an  only   It is not intended as medical advice for individual conditions or treatments  Talk to your doctor, nurse or pharmacist before following any medical regimen to see if it is safe and effective for you  Neck Exercises   AMBULATORY CARE:   Neck exercises  help reduce neck pain, and improve neck movement and strength  Neck exercises also help prevent long-term neck problems  What you need to know about neck exercises:   · Do the exercises every day,  or as often as directed by your healthcare provider  · Move slowly, gently, and smoothly  Avoid fast or jerky motions  · Stand and sit the way your healthcare provider shows you  Good posture may reduce your neck pain  Check your posture often, even when you are not doing your neck exercises  How to perform neck exercises safely:   · Exercise position:  You may sit or stand while you do neck exercises  Face forward  Your shoulders should be straight and relaxed, with a good posture  · Head tilts, forward and back:  Gently bow your head and try to touch your chin to your chest  Your healthcare provider may tell you to push on the back of your neck to help bow your head  Raise your chin back to the starting position  Tilt your head back as far as possible so you are looking up at the ceiling  Your healthcare provider may tell you to lift your chin to help tilt your head back  Return your head to the starting position  · Head tilts, side to side:  Tilt your head, bringing your ear toward your shoulder  Then tilt your head toward the other shoulder  · Head turns:  Turn your head to look over your shoulder  Tilt your chin down and try to touch it to your shoulder  Do not raise your shoulder to your chin  Face forward again  Do the same on the other side  · Head rolls:  Slowly bring your chin toward your chest  Next, roll your head to the right  Your ear should be positioned over your shoulder  Hold this position for 5 seconds   Roll your head back toward your chest and to the left into the same position  Hold for 5 seconds  Gently roll your head back and around in a clockwise [place] 3 times  Next, move your head in the reverse direction (counterclockwise) in a [place] 3 times  Do not shrug your shoulders upwards while you do this exercise  Contact your healthcare provider if:   · Your pain does not get better, or gets worse  · You have questions or concerns about your condition, care, or exercise program   © 2017 [address] [facility] Information is for End User's use only and may not be sold, redistributed or otherwise used for commercial purposes  All illustrations and images included in CareNotes® are the copyrighted property of [employer]  or [employer]  The above information is an  only  It is not intended as medical advice for individual conditions or treatments  Talk to your doctor, nurse or pharmacist before following any medical regimen to see if it is safe and effective for you

## 2019-02-23 ENCOUNTER — OFFICE VISIT (OUTPATIENT)
Dept: OBGYN CLINIC | Facility: CLINIC | Age: 54
End: 2019-02-23
Payer: COMMERCIAL

## 2019-02-23 VITALS
HEIGHT: 65 IN | DIASTOLIC BLOOD PRESSURE: 98 MMHG | WEIGHT: 210 LBS | BODY MASS INDEX: 34.99 KG/M2 | SYSTOLIC BLOOD PRESSURE: 172 MMHG

## 2019-02-23 DIAGNOSIS — E28.39 HYPOESTROGENISM: ICD-10-CM

## 2019-02-23 DIAGNOSIS — Z01.419 WELL WOMAN EXAM: Primary | ICD-10-CM

## 2019-02-23 PROCEDURE — 99386 PREV VISIT NEW AGE 40-64: CPT | Performed by: PHYSICIAN ASSISTANT

## 2019-02-23 NOTE — PROGRESS NOTES
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  In remission x 13 years, did lumpectomy and and chemo and radiation, no Tamoxifen  LMP approx 5-10 years ago  Does have few occasional hot flashes  No vaginal complaints  Pt does have complaints of continued weight gain and plans to see weight management office this week  Mary Yoder is a 48 y o  female who presents for annual well woman exam    LMP -approx 5-10 years ago, stopped during chemo, then had again after chemo was completed had it for a few years, then stopped again ; Denies  bleeding  No vulvar itch/burn; No vaginal itch/burn; No abn discharge or odor; No urinary sx - burning/pain/frequency/hematuria  (+) SBEs - no breast masses, asymmetry, nipple discharge or bleeding, changes in skin of breast, or breast tenderness bilaterally   Pt is sexually active in a mutually monog/ sexual relationship; No issues with intercourse; She declines std/hiv/hep testing; Feels safe at home    (+) PCP for routine Bw/care-Dr Kassidy Kern    Last Pap - 8/23/16 WNL (HPV reflex)  History of abnormal Pap smear: none  Last mammo - 1/2019 WNL  History of abnormal mammogram: h/o left breast cancer 2006  Last colonoscopy - none yet, schedule 3/22/19  Last Dexa scan - none    Review of Systems   Constitutional: Negative for fatigue, fever and unexpected weight change  HENT: Negative for dental problem, mouth sores, nosebleeds, rhinorrhea, sinus pressure, sinus pain and sore throat  Eyes: Negative for pain, discharge and visual disturbance  Respiratory: Negative for cough, chest tightness, shortness of breath and wheezing      Cardiovascular: Negative for chest pain, palpitations and leg swelling  Gastrointestinal: Negative for blood in stool, constipation, diarrhea, nausea and vomiting  Endocrine: Negative for polydipsia  Genitourinary: Negative for difficulty urinating, dyspareunia, dysuria, menstrual problem, pelvic pain, urgency, vaginal discharge and vaginal pain  Musculoskeletal: Negative for arthralgias, back pain and joint swelling  Allergic/Immunologic: Negative for environmental allergies  Neurological: Negative for seizures, light-headedness and headaches  Hematological: Does not bruise/bleed easily  Psychiatric/Behavioral: Negative for sleep disturbance  The patient is not nervous/anxious          The following portions of the patient's history were reviewed and updated as appropriate: allergies, current medications, past family history, past medical history, past social history, past surgical history and problem list          OB History        2    Para   2    Term   2            AB        Living           SAB        TAB        Ectopic        Multiple        Live Births               Obstetric Comments    x 2              Past Medical History:   Diagnosis Date    Cancer ([facility] )     L breast    Elevated blood pressure reading     History of chemotherapy     History of radiation therapy     Major depression     Malignant neoplasm of breast ([facility] )        Past Surgical History:   Procedure Laterality Date    BREAST LUMPECTOMY Left      SECTION      ENDOMETRIAL ABLATION      INTRAOPERATIVE RADIATION THERAPY ([facility])      last assessed: 7/17/15       Family History   Problem Relation Age of Onset    Hypertension Family     Hypertension Mother     Hypertension Father     ADD / ADHD Daughter        Social History     Socioeconomic History    Marital status: /Civil Union     Spouse name: Not on file    Number of children: Not on file    Years of education: Not on file    Highest education level: Not on file   Occupational History    Not on file   Social Needs    Financial resource strain: Not on file    Food insecurity:     Worry: Not on file     Inability: Not on file    Transportation needs:     Medical: Not on file     Non-medical: Not on file   Tobacco Use    Smoking status: Never Smoker    Smokeless tobacco: Never Used   Substance and Sexual Activity    Alcohol use: No    Drug use: No    Sexual activity: Yes   Lifestyle    Physical activity:     Days per week: Not on file     Minutes per session: Not on file    Stress: Not on file   Relationships    Social connections:     Talks on phone: Not on file     Gets together: Not on file     Attends Rastafarian service: Not on file     Active member of club or organization: Not on file     Attends meetings of clubs or organizations: Not on file     Relationship status: Not on file    Intimate partner violence:     Fear of current or ex partner: Not on file     Emotionally abused: Not on file     Physically abused: Not on file     Forced sexual activity: Not on file   Other Topics Concern    Not on file   Social History Narrative    Not on file         Current Outpatient Medications:     Multiple Vitamins-Minerals (MULTIVITAMIN GUMMIES ADULTS PO), Take by mouth daily, Disp: , Rfl:     Na Sulfate-K Sulfate-Mg Sulf (SUPREP BOWEL PREP KIT) 17 5-3 13-1 6 GM/177ML SOLN, Take 2 Bottles by mouth see administration instructions Suprep bowel prep  Please follow the instructions from the office, Disp: 2 Bottle, Rfl: 0    Allergies   Allergen Reactions    Percocet [Oxycodone-Acetaminophen] Rash    Percolone [Oxycodone] Rash    Shellfish-Derived Products Rash       Objective   Vitals:    02/23/19 1121   BP: (!) 172/98   BP Location: Left arm   Patient Position: Sitting   Cuff Size: Large   Weight: 95 3 kg (210 lb)   Height: 5' 5" (1 651 m)     Physical Exam   Constitutional: She is oriented to person, place, and time   She appears well-developed and well-nourished  HENT:   Head: Normocephalic and atraumatic  Cardiovascular: Normal rate and regular rhythm  Pulmonary/Chest: Effort normal and breath sounds normal  Right breast exhibits no inverted nipple, no mass, no nipple discharge, no skin change and no tenderness  Left breast exhibits no inverted nipple, no mass, no nipple discharge, no skin change and no tenderness  Breasts are symmetrical    Abdominal: Soft  She exhibits no distension and no mass  There is no rebound and no guarding  Neurological: She is alert and oriented to person, place, and time  Skin: Skin is warm and dry  Psychiatric: She has a normal mood and affect  Patient Instructions   Pt is following closely w Dr Lalo Joseph for BP management  She had a stressful evening and just moved and is anxious about some missing items which she feels could be aggravating her BP today  She will keep a close eye on it  Pap done today  Will plan to see weight management this week as well

## 2019-02-23 NOTE — PATIENT INSTRUCTIONS
Pt is following closely w Dr Earnest Kohler for BP management  She had a stressful evening and just moved and is anxious about some missing items which she feels could be aggravating her BP today  She will keep a close eye on it  Pap done today  Will plan to see weight management this week as well

## 2019-02-27 ENCOUNTER — TELEPHONE (OUTPATIENT)
Dept: FAMILY MEDICINE CLINIC | Facility: CLINIC | Age: 54
End: 2019-02-27

## 2019-02-27 DIAGNOSIS — I10 ESSENTIAL HYPERTENSION: Primary | ICD-10-CM

## 2019-02-27 LAB
HPV HR 12 DNA CVX QL NAA+PROBE: NOT DETECTED
HPV16 DNA SPEC QL NAA+PROBE: NOT DETECTED
HPV18 DNA SPEC QL NAA+PROBE: NOT DETECTED
THIN PREP CVX: NORMAL

## 2019-02-27 RX ORDER — HYDROCHLOROTHIAZIDE 12.5 MG/1
12.5 TABLET ORAL DAILY
Qty: 30 TABLET | Refills: 1 | Status: SHIPPED | OUTPATIENT
Start: 2019-02-27 | End: 2019-04-25 | Stop reason: SDUPTHER

## 2019-03-07 ENCOUNTER — TELEPHONE (OUTPATIENT)
Dept: FAMILY MEDICINE CLINIC | Facility: CLINIC | Age: 54
End: 2019-03-07

## 2019-03-07 NOTE — TELEPHONE ENCOUNTER
Called patient and she states she wants to talk about her colonoscopy test, she has a few questions regarding the test   Please advise  Juan Alberto Carlson MA

## 2019-03-08 NOTE — TELEPHONE ENCOUNTER
3/8/2019 9:49 AM Called Margret Guzmán again  We discussed the colonoscopy procedure and how it works  Advised to not take her water pill the day of her procedure  I also recommended that she sign up for [employer] to keep her instructions private       Message complete  Zamzam Britton, DO

## 2019-03-21 ENCOUNTER — ANESTHESIA EVENT (OUTPATIENT)
Dept: GASTROENTEROLOGY | Facility: AMBULARY SURGERY CENTER | Age: 54
End: 2019-03-21
Payer: COMMERCIAL

## 2019-03-21 NOTE — PRE-PROCEDURE INSTRUCTIONS
Pre-Surgery Instructions:   Medication Instructions    hydrochlorothiazide (HYDRODIURIL) 12 5 mg tablet Patient was instructed by Physician and understands   Multiple Vitamins-Minerals (MULTIVITAMIN GUMMIES ADULTS PO) Patient was instructed by Physician and understands   Na Sulfate-K Sulfate-Mg Sulf (SUPREP BOWEL PREP KIT) 17 5-3 13-1 6 GM/177ML SOLN Patient was instructed by Physician and understands  Pt to follow Dr Oscar Du instructions     Veronica Males [phone]

## 2019-03-22 ENCOUNTER — HOSPITAL ENCOUNTER (OUTPATIENT)
Facility: AMBULARY SURGERY CENTER | Age: 54
Setting detail: OUTPATIENT SURGERY
Discharge: HOME | End: 2019-03-22
Attending: INTERNAL MEDICINE | Admitting: INTERNAL MEDICINE
Payer: COMMERCIAL

## 2019-03-22 ENCOUNTER — ANESTHESIA (OUTPATIENT)
Dept: GASTROENTEROLOGY | Facility: AMBULARY SURGERY CENTER | Age: 54
End: 2019-03-22
Payer: COMMERCIAL

## 2019-03-22 VITALS
RESPIRATION RATE: 18 BRPM | SYSTOLIC BLOOD PRESSURE: 189 MMHG | OXYGEN SATURATION: 99 % | HEIGHT: 65 IN | HEART RATE: 71 BPM | WEIGHT: 210 LBS | DIASTOLIC BLOOD PRESSURE: 88 MMHG | TEMPERATURE: 98 F | BODY MASS INDEX: 34.99 KG/M2

## 2019-03-22 DIAGNOSIS — Z12.11 ENCOUNTER FOR SCREENING COLONOSCOPY: ICD-10-CM

## 2019-03-22 PROCEDURE — 88305 TISSUE EXAM BY PATHOLOGIST: CPT | Performed by: PATHOLOGY

## 2019-03-22 PROCEDURE — 45380 COLONOSCOPY AND BIOPSY: CPT | Performed by: INTERNAL MEDICINE

## 2019-03-22 RX ORDER — PROPOFOL 10 MG/ML
INJECTION, EMULSION INTRAVENOUS CONTINUOUS PRN
Status: DISCONTINUED | OUTPATIENT
Start: 2019-03-22 | End: 2019-03-22 | Stop reason: SURG

## 2019-03-22 RX ORDER — PROPOFOL 10 MG/ML
INJECTION, EMULSION INTRAVENOUS AS NEEDED
Status: DISCONTINUED | OUTPATIENT
Start: 2019-03-22 | End: 2019-03-22 | Stop reason: SURG

## 2019-03-22 RX ORDER — SODIUM CHLORIDE, SODIUM LACTATE, POTASSIUM CHLORIDE, CALCIUM CHLORIDE 600; 310; 30; 20 MG/100ML; MG/100ML; MG/100ML; MG/100ML
100 INJECTION, SOLUTION INTRAVENOUS CONTINUOUS
Status: DISCONTINUED | OUTPATIENT
Start: 2019-03-22 | End: 2019-03-22 | Stop reason: HOSPADM

## 2019-03-22 RX ADMIN — PROPOFOL 100 MG: 10 INJECTION, EMULSION INTRAVENOUS at 08:52

## 2019-03-22 RX ADMIN — PROPOFOL 160 MCG/KG/MIN: 10 INJECTION, EMULSION INTRAVENOUS at 08:52

## 2019-03-22 RX ADMIN — SODIUM CHLORIDE, SODIUM LACTATE, POTASSIUM CHLORIDE, AND CALCIUM CHLORIDE: .6; .31; .03; .02 INJECTION, SOLUTION INTRAVENOUS at 08:49

## 2019-03-22 NOTE — ANESTHESIA PREPROCEDURE EVALUATION
Review of Systems/Medical History  Patient summary reviewed  Chart reviewed      Cardiovascular  Exercise tolerance (METS): >4,  Hypertension ,    Pulmonary       GI/Hepatic    Bowel prep            Endo/Other    Obesity  morbid obesity   GYN    Breast cancer left lumpectomy       Hematology   Musculoskeletal       Neurology   Psychology   Depression ,              Physical Exam    Airway    Mallampati score: I  TM Distance: >3 FB  Neck ROM: full     Dental       Cardiovascular  Rhythm: regular, Rate: normal,     Pulmonary  Breath sounds clear to auscultation,     Other Findings        Anesthesia Plan  ASA Score- 2     Anesthesia Type- IV sedation with anesthesia with ASA Monitors  Additional Monitors:   Airway Plan:         Plan Factors-    Induction- intravenous  Postoperative Plan-     Informed Consent- Anesthetic plan and risks discussed with patient

## 2019-03-22 NOTE — OP NOTE
Colonoscopy Procedure Note    Procedure: Colonoscopy    Sedation: Monitored anesthesia care, check anesthesia records      ASA Class: 2    INDICATIONS:  Colon cancer screening  POST-OP DIAGNOSIS: See the impression below    Procedure Details     Prior colonoscopy: No prior colonoscopy  Informed consent was obtained for the procedure, including sedation  Risks of perforation, hemorrhage, adverse drug reaction and aspiration were discussed  The patient was placed in the left lateral decubitus position  Based on the pre-procedure assessment, including review of the patient's medical history, medications, allergies, and review of systems, she had been deemed to be an appropriate candidate for conscious sedation; she was therefore sedated with the medications listed below  The patient was monitored continuously with telemetry, pulse oximetry, blood pressure monitoring, and direct observations  A rectal examination was performed  The variable-stiffness pediatric colonoscope was inserted into the rectum and advanced under direct vision to the terminal ileum  The quality of the colonic preparation was good  A careful inspection was made as the colonoscope was withdrawn, including a retroflexed view of the rectum; findings and interventions are described below  Findings:  1  3 mm sessile polyp noted in the transverse colon, removed using cold biopsy forceps  2  Diminutive polyp noted in the descending colon, removed using cold biopsy forceps  3  Retroflexed view was notable for small internal hemorrhoids  Complications: None; patient tolerated the procedure well  Impression:    1  Two colon polyps  2  Small internal hemorrhoids  Recommendations:  Repeat colonoscopy in 5 years if polyps are adenomas  High-fiber diet  Follow-up biopsy results in 2-3 weeks  COMPLICATIONS:  None; patient tolerated the procedure well      SPECIMENS:    ID Type Source Tests Collected by Time Destination 1 : bx polyp transverse colon Tissue Colon TISSUE EXAM Anna Jarrett MD 3/22/2019  9:02 AM    2 : bx polyp descending colon Tissue Colon TISSUE EXAM Anna Jarrett MD 3/22/2019  9:04 AM        ESTIMATED BLOOD LOSS:  Minimal

## 2019-03-22 NOTE — H&P
History and Physical -  [facility]  Cara Ramirez 48 y o  female MRN: [id number]    HPI: Cara Ramirez is a 48y o  year old female with history of breast cancer status post lumpectomy/and radiation who presents for colon cancer screening  Patient has never undergone a colonoscopy, denies change in bowel habits, hematochezia or abdominal pain  She denies family history of GI malignancy including colon cancer  Review of Systems    Historical Information   Past Medical History:   Diagnosis Date    Cancer ([facility] )     L breast-lumpectomy    Contact lens/glasses fitting     History of chemotherapy     History of depression     History of radiation therapy     Hypertension     [facility] ([facility] )      Past Surgical History:   Procedure Laterality Date    BREAST LUMPECTOMY Left 2006     SECTION      x2    ENDOMETRIAL ABLATION      INTRAOPERATIVE RADIATION THERAPY ([facility])      last assessed: 7/17/15     Social History   Social History     Substance and Sexual Activity   Alcohol Use No     Social History     Substance and Sexual Activity   Drug Use No     Social History     Tobacco Use   Smoking Status Never Smoker   Smokeless Tobacco Never Used     Family History   Problem Relation Age of Onset    Hypertension Family     Hypertension Mother     Heart disease Mother         CHF-related to smoking    ADD / ADHD Daughter        Meds/Allergies     Medications Prior to Admission   Medication    hydrochlorothiazide (HYDRODIURIL) 12 5 mg tablet    Multiple Vitamins-Minerals (MULTIVITAMIN GUMMIES ADULTS PO)       Allergies   Allergen Reactions    Percocet [Oxycodone-Acetaminophen] Rash    Percolone [Oxycodone] Rash    Shellfish-Derived Products Rash       Objective     Blood pressure 136/92, pulse 96, temperature 98 °F (36 7 °C), temperature source Tympanic, resp  rate 20, height 5' 5" (1 651 m), weight 95 3 kg (210 lb), SpO2 99 %        PHYSICAL EXAM    Gen: NAD  CV: RRR  CHEST: Clear  ABD: soft, NT/ND  EXT: no edema  Neuro: AAO      ASSESSMENT/PLAN:  This is a 48y o  year old female here for colon cancer screening  PLAN:   Procedure:  Colonoscopy

## 2019-03-22 NOTE — DISCHARGE INSTR - AVS FIRST PAGE
Colonoscopy Procedure Note    Procedure: Colonoscopy    Sedation: Monitored anesthesia care, check anesthesia records      ASA Class: 2    INDICATIONS:  Colon cancer screening  POST-OP DIAGNOSIS: See the impression below    Procedure Details     Prior colonoscopy: No prior colonoscopy  Informed consent was obtained for the procedure, including sedation  Risks of perforation, hemorrhage, adverse drug reaction and aspiration were discussed  The patient was placed in the left lateral decubitus position  Based on the pre-procedure assessment, including review of the patient's medical history, medications, allergies, and review of systems, she had been deemed to be an appropriate candidate for conscious sedation; she was therefore sedated with the medications listed below  The patient was monitored continuously with telemetry, pulse oximetry, blood pressure monitoring, and direct observations  A rectal examination was performed  The variable-stiffness pediatric colonoscope was inserted into the rectum and advanced under direct vision to the terminal ileum  The quality of the colonic preparation was good  A careful inspection was made as the colonoscope was withdrawn, including a retroflexed view of the rectum; findings and interventions are described below  Findings:  1  3 mm sessile polyp noted in the transverse colon, removed using cold biopsy forceps  2  Diminutive polyp noted in the descending colon, removed using cold biopsy forceps  3  Retroflexed view was notable for small internal hemorrhoids  Complications: None; patient tolerated the procedure well  Impression:    1  Two colon polyps  2  Small internal hemorrhoids  Recommendations:  Repeat colonoscopy in 5 years if polyps are adenomas  High-fiber diet  Follow-up biopsy results in 2-3 weeks  COMPLICATIONS:  None; patient tolerated the procedure well      SPECIMENS:    ID Type Source Tests Collected by Time Destination 1 : bx polyp transverse colon Tissue Colon TISSUE EXAM Ayush Roca MD 3/22/2019  9:02 AM    2 : bx polyp descending colon Tissue Colon TISSUE EXAM Ayush Roca MD 3/22/2019  9:04 AM        ESTIMATED BLOOD LOSS:  Minimal

## 2019-03-27 ENCOUNTER — TELEPHONE (OUTPATIENT)
Dept: GASTROENTEROLOGY | Facility: CLINIC | Age: 54
End: 2019-03-27

## 2019-03-27 NOTE — LETTER
March 27, 2019     Niya Mejia [phone]      Dear Ms Donis: We have attempted to reach you regarding your results  We ask that you please contact our office upon receipt of this letter to receive your results  Thank you in advance for your cooperation and assistance          Sincerely,   Soledad Marie [facility] Staff  [phone]

## 2019-04-01 ENCOUNTER — OFFICE VISIT (OUTPATIENT)
Dept: FAMILY MEDICINE CLINIC | Facility: CLINIC | Age: 54
End: 2019-04-01
Payer: COMMERCIAL

## 2019-04-01 VITALS
SYSTOLIC BLOOD PRESSURE: 136 MMHG | TEMPERATURE: 97.1 F | RESPIRATION RATE: 16 BRPM | WEIGHT: 211 LBS | DIASTOLIC BLOOD PRESSURE: 72 MMHG | HEIGHT: 65 IN | HEART RATE: 84 BPM | BODY MASS INDEX: 35.16 KG/M2

## 2019-04-01 DIAGNOSIS — I10 ESSENTIAL HYPERTENSION: Primary | ICD-10-CM

## 2019-04-01 PROCEDURE — 1036F TOBACCO NON-USER: CPT | Performed by: FAMILY MEDICINE

## 2019-04-01 PROCEDURE — 3075F SYST BP GE 130 - 139MM HG: CPT | Performed by: FAMILY MEDICINE

## 2019-04-01 PROCEDURE — 3078F DIAST BP <80 MM HG: CPT | Performed by: FAMILY MEDICINE

## 2019-04-01 PROCEDURE — 3008F BODY MASS INDEX DOCD: CPT | Performed by: FAMILY MEDICINE

## 2019-04-01 PROCEDURE — 99213 OFFICE O/P EST LOW 20 MIN: CPT | Performed by: FAMILY MEDICINE

## 2019-04-02 ENCOUNTER — TELEPHONE (OUTPATIENT)
Dept: FAMILY MEDICINE CLINIC | Facility: CLINIC | Age: 54
End: 2019-04-02

## 2019-04-02 PROBLEM — E66.811 CLASS 1 OBESITY DUE TO EXCESS CALORIES WITHOUT SERIOUS COMORBIDITY WITH BODY MASS INDEX (BMI) OF 34.0 TO 34.9 IN ADULT: Status: RESOLVED | Noted: 2018-12-03 | Resolved: 2019-04-02

## 2019-04-02 PROBLEM — E66.09 CLASS 1 OBESITY DUE TO EXCESS CALORIES WITHOUT SERIOUS COMORBIDITY WITH BODY MASS INDEX (BMI) OF 34.0 TO 34.9 IN ADULT: Status: RESOLVED | Noted: 2018-12-03 | Resolved: 2019-04-02

## 2019-04-25 DIAGNOSIS — I10 ESSENTIAL HYPERTENSION: ICD-10-CM

## 2019-04-25 RX ORDER — HYDROCHLOROTHIAZIDE 12.5 MG/1
TABLET ORAL
Qty: 30 TABLET | Refills: 3 | Status: SHIPPED | OUTPATIENT
Start: 2019-04-25 | End: 2019-05-21 | Stop reason: SDUPTHER

## 2019-05-06 ENCOUNTER — TELEPHONE (OUTPATIENT)
Dept: FAMILY MEDICINE CLINIC | Facility: CLINIC | Age: 54
End: 2019-05-06

## 2019-05-21 DIAGNOSIS — I10 ESSENTIAL HYPERTENSION: ICD-10-CM

## 2019-05-21 RX ORDER — HYDROCHLOROTHIAZIDE 12.5 MG/1
12.5 TABLET ORAL DAILY
Qty: 30 TABLET | Refills: 3 | Status: SHIPPED | OUTPATIENT
Start: 2019-05-21 | End: 2019-09-03 | Stop reason: SDUPTHER

## 2019-06-07 ENCOUNTER — TELEPHONE (OUTPATIENT)
Dept: FAMILY MEDICINE CLINIC | Facility: CLINIC | Age: 54
End: 2019-06-07

## 2019-06-09 NOTE — TELEPHONE ENCOUNTER
----- Message from Sekou Ashford MD sent at 3/26/2019 12:01 PM EDT -----  Please inform the patient that the polyps removed were benign, would recommend repeat colonoscopy in 5 years
Attempted to contact pt via telephone, lmom for pt to call office  Letter also sent    Recall and hm set
Patient returned call regarding result   Please reach out to her at [phone]
Pt is aware of results
Pt returned call again asking if she can be called back before the end of day
Pt returned call for results
Warm

## 2019-06-17 ENCOUNTER — TELEPHONE (OUTPATIENT)
Dept: FAMILY MEDICINE CLINIC | Facility: CLINIC | Age: 54
End: 2019-06-17

## 2019-07-23 NOTE — TELEPHONE ENCOUNTER
Please advise  I looked in EPIC and [employer]  I do not see that pt had labs done in 2017, but I see a mammo and colonoscopy order   Jamir Gonzalez [place] Normal vision: sees adequately in most situations; can see medication labels, newsprint

## 2019-08-20 ENCOUNTER — TELEPHONE (OUTPATIENT)
Dept: FAMILY MEDICINE CLINIC | Facility: CLINIC | Age: 54
End: 2019-08-20

## 2019-08-20 NOTE — TELEPHONE ENCOUNTER
Nayeli Ruelas calling to speak with a doctor regarding bloating and what she can take for it over the counter      Please call Nayeli Ruelas around     AROUND 1 or 2ish

## 2019-08-26 NOTE — TELEPHONE ENCOUNTER
8/26/2019 1:16 PM Returned call to Cindi Mayer  Message left on her voice mail to call the office    Barbara Freeman DO

## 2019-08-26 NOTE — TELEPHONE ENCOUNTER
8/26/2019 5:25 PM Called Tabitha Bonner  She is feeling better  Her gas has resolved      Message complete  Reyna Roca,

## 2019-08-26 NOTE — TELEPHONE ENCOUNTER
Sending to Dr Michel Delgadillo   this patient usually does not want to speak with a nurse and would prefer to speak with Dr Michel Delgadillo  There have been multiple attempts on reaching out    Please advise only available between 1-2pm  Kathryn Cantu MA

## 2019-09-03 DIAGNOSIS — I10 ESSENTIAL HYPERTENSION: ICD-10-CM

## 2019-09-03 RX ORDER — HYDROCHLOROTHIAZIDE 12.5 MG/1
12.5 TABLET ORAL DAILY
Qty: 90 TABLET | Refills: 1 | Status: SHIPPED | OUTPATIENT
Start: 2019-09-03 | End: 2019-10-07 | Stop reason: SDUPTHER

## 2019-09-18 ENCOUNTER — TELEPHONE (OUTPATIENT)
Dept: FAMILY MEDICINE CLINIC | Facility: CLINIC | Age: 54
End: 2019-09-18

## 2019-09-18 NOTE — TELEPHONE ENCOUNTER
Pt asking if OK to start taking  biotin, vit b and folic acid for hair and nails , pt stated is just being cautious because she just started taking water pill       Please advise

## 2019-09-18 NOTE — TELEPHONE ENCOUNTER
Ronit Mayers would like to speak with a nurse  I asked what it was regarding, said she just has a question for a nurse      Call Ronit Mayers back at [phone]    Thank you

## 2019-10-07 DIAGNOSIS — I10 ESSENTIAL HYPERTENSION: ICD-10-CM

## 2019-10-07 RX ORDER — HYDROCHLOROTHIAZIDE 12.5 MG/1
12.5 TABLET ORAL DAILY
Qty: 90 TABLET | Refills: 1 | Status: SHIPPED | OUTPATIENT
Start: 2019-10-07 | End: 2020-07-02

## 2019-10-28 ENCOUNTER — OFFICE VISIT (OUTPATIENT)
Dept: FAMILY MEDICINE CLINIC | Facility: CLINIC | Age: 54
End: 2019-10-28
Payer: COMMERCIAL

## 2019-10-28 VITALS
HEART RATE: 76 BPM | BODY MASS INDEX: 33.89 KG/M2 | RESPIRATION RATE: 18 BRPM | SYSTOLIC BLOOD PRESSURE: 136 MMHG | DIASTOLIC BLOOD PRESSURE: 86 MMHG | HEIGHT: 65 IN | TEMPERATURE: 97.2 F | WEIGHT: 203.4 LBS | OXYGEN SATURATION: 98 %

## 2019-10-28 DIAGNOSIS — E66.09 CLASS 1 OBESITY DUE TO EXCESS CALORIES WITH SERIOUS COMORBIDITY AND BODY MASS INDEX (BMI) OF 33.0 TO 33.9 IN ADULT: ICD-10-CM

## 2019-10-28 DIAGNOSIS — I10 ESSENTIAL HYPERTENSION: Primary | ICD-10-CM

## 2019-10-28 PROCEDURE — 3008F BODY MASS INDEX DOCD: CPT | Performed by: FAMILY MEDICINE

## 2019-10-28 PROCEDURE — 3079F DIAST BP 80-89 MM HG: CPT | Performed by: FAMILY MEDICINE

## 2019-10-28 PROCEDURE — 99213 OFFICE O/P EST LOW 20 MIN: CPT | Performed by: FAMILY MEDICINE

## 2019-10-28 PROCEDURE — 3075F SYST BP GE 130 - 139MM HG: CPT | Performed by: FAMILY MEDICINE

## 2019-10-28 NOTE — PROGRESS NOTES
Assessment/Plan:    1  Essential hypertension  Assessment & Plan:  Well controlled     Orders:  -     CBC; Future  -     Comprehensive metabolic panel; Future  -     Lipid Panel with Direct LDL reflex; Future    2  Class 1 obesity due to excess calories with serious comorbidity and body mass index (BMI) of 33 0 to 33 9 in adult  -     Ambulatory referral to Weight Management; Future        BMI Counseling: Body mass index is 33 85 kg/m²  Discussed the patient's BMI with her  The BMI is above normal  Exercise recommendations include exercising 3-5 times per week  She declined her flu shot today, she is going to come in later in the week with her daughter  There are no Patient Instructions on file for this visit  Return in about 6 months (around 4/28/2020) for Annual physical     Subjective:      Patient ID: Sugar Keith is a 48 y o  female  Chief Complaint   Patient presents with    Follow-up     [facility] lpn       She is doing well  She is watching her diet  Hypertension   This is a chronic problem  The current episode started more than 1 year ago  The problem is controlled  Pertinent negatives include no peripheral edema  Past treatments include diuretics  The current treatment provides moderate improvement  There are no compliance problems  The following portions of the patient's history were reviewed and updated as appropriate:  past social history    Review of Systems      Current Outpatient Medications   Medication Sig Dispense Refill    hydrochlorothiazide (HYDRODIURIL) 12 5 mg tablet Take 1 tablet (12 5 mg total) by mouth daily 90 tablet 1    Multiple Vitamins-Minerals (MULTIVITAMIN GUMMIES ADULTS PO) Take by mouth every morning        No current facility-administered medications for this visit          Objective:    /86   Pulse 76   Temp (!) 97 2 °F (36 2 °C)   Resp 18   Ht 5' 5" (1 651 m)   Wt 92 3 kg (203 lb 6 4 oz)   SpO2 98%   BMI 33 85 kg/m²        Physical Exam   Constitutional: She appears well-developed and well-nourished  HENT:   Head: Normocephalic and atraumatic  Right Ear: External ear normal    Left Ear: External ear normal    Mouth/Throat: Oropharynx is clear and moist    Cardiovascular: Normal rate, regular rhythm and normal heart sounds  Exam reveals no friction rub  No murmur heard  Pulmonary/Chest: Effort normal and breath sounds normal  No respiratory distress  She has no wheezes  She has no rales  Musculoskeletal: She exhibits no edema or deformity  Nursing note and vitals reviewed               Koreen Range, DO

## 2019-11-02 ENCOUNTER — OFFICE VISIT (OUTPATIENT)
Dept: URGENT CARE | Facility: CLINIC | Age: 54
End: 2019-11-02
Payer: COMMERCIAL

## 2019-11-02 VITALS
RESPIRATION RATE: 16 BRPM | DIASTOLIC BLOOD PRESSURE: 90 MMHG | SYSTOLIC BLOOD PRESSURE: 128 MMHG | TEMPERATURE: 99 F | OXYGEN SATURATION: 97 % | HEART RATE: 75 BPM

## 2019-11-02 DIAGNOSIS — Z23 INFLUENZA VACCINE ADMINISTERED: Primary | ICD-10-CM

## 2019-11-02 PROCEDURE — 90471 IMMUNIZATION ADMIN: CPT

## 2019-11-02 PROCEDURE — 99201 PR OFFICE OUTPATIENT NEW 10 MINUTES: CPT | Performed by: PHYSICIAN ASSISTANT

## 2019-11-02 PROCEDURE — 90682 RIV4 VACC RECOMBINANT DNA IM: CPT

## 2019-12-12 ENCOUNTER — TELEPHONE (OUTPATIENT)
Dept: FAMILY MEDICINE CLINIC | Facility: CLINIC | Age: 54
End: 2019-12-12

## 2019-12-12 DIAGNOSIS — Z91.013 SHELLFISH ALLERGY: Primary | ICD-10-CM

## 2019-12-12 NOTE — TELEPHONE ENCOUNTER
Yvon Drake wants to speak with Dr Lexus Garzon or a nurse about an on going issue  She did not want to speak with me only a nurse or Dr Lexus Garzon she said Twice      Please call [phone]

## 2019-12-12 NOTE — TELEPHONE ENCOUNTER
I called the patient and she asked if she can get a referral to an allergist for shellfish allergy testing    Please advise  Sherin Perdomo MA

## 2019-12-31 ENCOUNTER — OFFICE VISIT (OUTPATIENT)
Dept: BARIATRICS | Facility: CLINIC | Age: 54
End: 2019-12-31
Payer: COMMERCIAL

## 2019-12-31 VITALS
HEIGHT: 65 IN | SYSTOLIC BLOOD PRESSURE: 130 MMHG | TEMPERATURE: 99.2 F | HEART RATE: 72 BPM | DIASTOLIC BLOOD PRESSURE: 80 MMHG | BODY MASS INDEX: 34.12 KG/M2 | RESPIRATION RATE: 16 BRPM | WEIGHT: 204.8 LBS

## 2019-12-31 DIAGNOSIS — I10 ESSENTIAL HYPERTENSION: ICD-10-CM

## 2019-12-31 DIAGNOSIS — R63.5 ABNORMAL WEIGHT GAIN: Primary | ICD-10-CM

## 2019-12-31 DIAGNOSIS — Z85.3 HISTORY OF LEFT BREAST CANCER: ICD-10-CM

## 2019-12-31 DIAGNOSIS — E66.9 CLASS 1 OBESITY: ICD-10-CM

## 2019-12-31 PROBLEM — E66.811 CLASS 1 OBESITY: Status: ACTIVE | Noted: 2019-12-31

## 2019-12-31 PROCEDURE — 3075F SYST BP GE 130 - 139MM HG: CPT | Performed by: PHYSICIAN ASSISTANT

## 2019-12-31 PROCEDURE — 3079F DIAST BP 80-89 MM HG: CPT | Performed by: PHYSICIAN ASSISTANT

## 2019-12-31 PROCEDURE — 99204 OFFICE O/P NEW MOD 45 MIN: CPT | Performed by: PHYSICIAN ASSISTANT

## 2019-12-31 NOTE — PATIENT INSTRUCTIONS
Goals: Food log (ie ) www myfitnesspal com,sparkpeople  com,loseit com,calorieking  com,etc    No sugary beverages  At least 64oz of water daily  Increase physical activity by 10 minutes daily   Gradually increase physical activity to a goal of 5 days per week for 30 minutes of MODERATE intensity PLUS 2 days per week of FULL BODY resistance training  1000 on sedentary days, 8568-8636 calories on exercise days  5-10 servings of fruits and vegetables per day  Monitor blood pressure, if 100/60 or lower or feeling lightheaded/dizzy please notify PCP

## 2019-12-31 NOTE — ASSESSMENT & PLAN NOTE
-Discussed options of HealthyCORE-Intensive Lifestyle Intervention Program, Very Low Calorie Diet-VLCD and Conservative Program and the role of weight loss medications   -Initial weight loss goal of 5-10% weight loss for improved health  -Screening labs  -Patient is interested in pursuing

## 2019-12-31 NOTE — PROGRESS NOTES
Assessment/Plan:    Class 1 obesity  -Discussed options of HealthyCORE-Intensive Lifestyle Intervention Program, Very Low Calorie Diet-VLCD and Conservative Program and the role of weight loss medications   -Initial weight loss goal of 5-10% weight loss for improved health  -Screening labs  -Patient is interested in pursuing     Essential hypertension  -on HCTZ  -blood pressure normotensive  -likely to improve with weight loss    History of left breast cancer  -s/p lumpectomy/chemo/radiation  -would benefit from more of a whole foods plant based diet    Goals:    Food log (ie ) www myfitnesspal com,sparkpeople  com,loseit com,calorieking  com,etc    No sugary beverages  At least 64oz of water daily  Increase physical activity by 10 minutes daily  Gradually increase physical activity to a goal of 5 days per week for 30 minutes of MODERATE intensity PLUS 2 days per week of FULL BODY resistance training  1000 on sedentary days, 6853-9434 calories on exercise days  5-10 servings of fruits and vegetables per day  Monitor blood pressure, if 100/60 or lower or feeling lightheaded/dizzy please notify PCP    Follow up in approximately 2 weeks with Non-Surgical Dietician and 6 weeks with PA-C    Diagnoses and all orders for this visit:    Abnormal weight gain  Comments:  see plan under class 1 obesity    Class 1 obesity    Essential hypertension    History of left breast cancer    Other orders  -     B Complex Vitamins (VITAMIN B COMPLEX PO); Take by mouth  -     Multiple Vitamins-Minerals (HAIR SKIN AND NAILS FORMULA PO); Take by mouth          Subjective:   Chief Complaint   Patient presents with    Consult     Patient is here for MW consult  Giovani Castro 2/8  Patient ID: Clayton Leon  is a 48 y o  female with excess weight/obesity here to pursue weight managment      Past Medical History:   Diagnosis Date    Cancer ([address] )     L breast-lumpectomy    Contact lens/glasses fitting     History of chemotherapy     History of depression     History of radiation therapy     Hypertension     Malignant neoplasm of breast [address] [address]    Premenopausal menorrhagia 3/28/2014         HPI:  Obesity/Excess Weight:  Severity: Mild  Onset:  Past 4 years  Modifiers: Diet and Exercise  Contributing factors: Insufficient Physical Activity and Stress/Emotional Eating  Associated symptoms: comorbid conditions and doesn't like the way clothes fit, gets discouraged    Goals: 135 lbs and maintain    B: breakfast smoothie with frozen fruit + yogurt  S: skips  L: grilled chicken salad   S: skips  D: pasta + ground turkey OR chicken + veggies + brown rice  S: occasional sweets     Hydration: water 1 gallon; 8oz green tea unsweetened  Exercise: 30 min walking 5x per week, gym membership at [email or web address]      Colonoscopy: completed 3/2019; due in 5 years    The following portions of the patient's history were reviewed and updated as appropriate: allergies, current medications, past family history, past medical history, past social history, past surgical history and problem list     Review of Systems   Constitutional: Negative for chills and fever  HENT: Negative for sore throat  Respiratory: Negative for cough and shortness of breath  Cardiovascular: Negative for chest pain and palpitations  Gastrointestinal: Negative for abdominal pain, constipation, diarrhea, nausea and vomiting  Genitourinary: Negative for dysuria  Musculoskeletal: Negative for arthralgias  Skin: Negative for rash  Neurological: Negative for dizziness and headaches  Psychiatric/Behavioral: The patient is not nervous/anxious           Reports mood stable       Objective:    /80 (BP Location: Left arm, Patient Position: Sitting, Cuff Size: Large)   Pulse 72   Temp 99 2 °F (37 3 °C) (Tympanic)   Resp 16   Ht 5' 5" (1 651 m)   Wt 92 9 kg (204 lb 12 8 oz)   BMI 34 08 kg/m²     Physical Exam   Nursing note and vitals reviewed  Constitutional   General appearance: Abnormal   well developed and obese  Eyes No conjunctival pallor  Ears, Nose, Mouth, and Throat Oral mucosa moist    Pulmonary   Respiratory effort: No increased work of breathing or signs of respiratory distress  Auscultation of lungs: Clear to auscultation, equal breath sounds bilaterally, no wheezes, no rales, no rhonci  Cardiovascular   Auscultation of heart: Normal rate and rhythm, normal S1 and S2, without murmurs  Examination of extremities for edema and/or varicosities: Normal   no edema  Abdomen   Abdomen: Abnormal   The abdomen was obese  Bowel sounds were normal  The abdomen was soft and nontender     Musculoskeletal   Gait and station: Normal     Psychiatric   Orientation to person, place and time: Normal     Affect: appropriate

## 2020-01-20 ENCOUNTER — TELEPHONE (OUTPATIENT)
Dept: FAMILY MEDICINE CLINIC | Facility: CLINIC | Age: 55
End: 2020-01-20

## 2020-01-20 DIAGNOSIS — R31.9 HEMATURIA, UNSPECIFIED TYPE: Primary | ICD-10-CM

## 2020-01-20 NOTE — TELEPHONE ENCOUNTER
Patient would like a phone call from Dr Katelyn Carlson this evening or tomorrow  Will only speak to Dr Katelyn Carlson

## 2020-01-20 NOTE — TELEPHONE ENCOUNTER
1/20/2020 6:41 PM Returned call to Geovanna  She is having a little bit of blood in her urine  She denies any dysuria or flank pain  She does have a remote history of kidney stones  Will check UA C&S   Order put in patient  and she will follow up in the office on 1/24/20 as scheduled      Message complete  Gutierrez Griffin DO

## 2020-01-21 ENCOUNTER — TRANSCRIBE ORDERS (OUTPATIENT)
Dept: ADMINISTRATIVE | Facility: HOSPITAL | Age: 55
End: 2020-01-21

## 2020-01-21 DIAGNOSIS — Z12.31 ENCOUNTER FOR SCREENING MAMMOGRAM FOR MALIGNANT NEOPLASM OF BREAST: Primary | ICD-10-CM

## 2020-01-23 ENCOUNTER — TELEPHONE (OUTPATIENT)
Dept: FAMILY MEDICINE CLINIC | Facility: CLINIC | Age: 55
End: 2020-01-23

## 2020-01-23 LAB
APPEARANCE UR: CLEAR
BACTERIA UR CULT: NORMAL
BACTERIA URNS QL MICRO: ABNORMAL
BILIRUB UR QL STRIP: NEGATIVE
COLOR UR: YELLOW
EPI CELLS #/AREA URNS HPF: ABNORMAL /HPF (ref 0–10)
GLUCOSE UR QL: NEGATIVE
HGB UR QL STRIP: ABNORMAL
KETONES UR QL STRIP: NEGATIVE
LEUKOCYTE ESTERASE UR QL STRIP: NEGATIVE
Lab: NO GROWTH
MICRO URNS: ABNORMAL
MUCOUS THREADS URNS QL MICRO: PRESENT
NITRITE UR QL STRIP: NEGATIVE
PH UR STRIP: 6.5 [PH] (ref 5–7.5)
PROT UR QL STRIP: NEGATIVE
RBC #/AREA URNS HPF: ABNORMAL /HPF (ref 0–2)
SP GR UR: 1.01 (ref 1–1.03)
UROBILINOGEN UR STRIP-ACNC: 0.2 MG/DL (ref 0.2–1)
WBC #/AREA URNS HPF: ABNORMAL /HPF (ref 0–5)

## 2020-01-23 NOTE — TELEPHONE ENCOUNTER
Results are still not final  I looked through [facility] and I only saw UA results  States Urine Culture "will follow"   Jason Trinidad, [address]

## 2020-01-24 ENCOUNTER — OFFICE VISIT (OUTPATIENT)
Dept: FAMILY MEDICINE CLINIC | Facility: CLINIC | Age: 55
End: 2020-01-24
Payer: COMMERCIAL

## 2020-01-24 ENCOUNTER — HOSPITAL ENCOUNTER (OUTPATIENT)
Dept: RADIOLOGY | Facility: HOSPITAL | Age: 55
Discharge: HOME | End: 2020-01-24
Payer: COMMERCIAL

## 2020-01-24 ENCOUNTER — TELEPHONE (OUTPATIENT)
Dept: FAMILY MEDICINE CLINIC | Facility: CLINIC | Age: 55
End: 2020-01-24

## 2020-01-24 ENCOUNTER — TRANSCRIBE ORDERS (OUTPATIENT)
Dept: ADMINISTRATIVE | Facility: HOSPITAL | Age: 55
End: 2020-01-24

## 2020-01-24 VITALS
DIASTOLIC BLOOD PRESSURE: 80 MMHG | HEART RATE: 89 BPM | RESPIRATION RATE: 16 BRPM | BODY MASS INDEX: 33.95 KG/M2 | WEIGHT: 204 LBS | SYSTOLIC BLOOD PRESSURE: 120 MMHG | TEMPERATURE: 97.7 F | OXYGEN SATURATION: 98 %

## 2020-01-24 DIAGNOSIS — R31.9 HEMATURIA, UNSPECIFIED TYPE: Primary | ICD-10-CM

## 2020-01-24 DIAGNOSIS — R31.9 HEMATURIA, UNSPECIFIED TYPE: ICD-10-CM

## 2020-01-24 DIAGNOSIS — Z12.31 SCREENING MAMMOGRAM, ENCOUNTER FOR: ICD-10-CM

## 2020-01-24 PROCEDURE — 1036F TOBACCO NON-USER: CPT | Performed by: FAMILY MEDICINE

## 2020-01-24 PROCEDURE — 99213 OFFICE O/P EST LOW 20 MIN: CPT | Performed by: FAMILY MEDICINE

## 2020-01-24 PROCEDURE — 74018 RADEX ABDOMEN 1 VIEW: CPT

## 2020-01-24 RX ORDER — NICOTINE POLACRILEX 2 MG
1 GUM BUCCAL
COMMUNITY
End: 2020-10-15 | Stop reason: ALTCHOICE

## 2020-01-24 NOTE — TELEPHONE ENCOUNTER
Called patient to get more information, patient said she would only speak with Dr Maria Eugenia Carlos and refused to give any information, forwarding to Dr Luann Millard MA

## 2020-01-24 NOTE — TELEPHONE ENCOUNTER
1/24/2020 5:07 PM return call to patient  She did go for her KUB today  She notes that she has a little bit of pink in her urine again today  I let her know I will call her when I get the x-ray results back and then we can make a further plan  She may need a CT scan for further evaluation      Message complete  Spenser Garzon, DO

## 2020-01-24 NOTE — PROGRESS NOTES
Assessment/Plan:    1  Hematuria, unspecified type  Comments:  has history of kidney stones, will check KUB  Orders:  -     XR abdomen 1 view kub; Future; Expected date: 01/24/2020    2  Screening mammogram, encounter for  -     Mammo screening bilateral w 3d & cad; Future; Expected date: 01/24/2020      BMI Counseling: There is no height or weight on file to calculate BMI  The BMI is above normal  Exercise recommendations include exercising 3-5 times per week  There are no Patient Instructions on file for this visit  Return for April as scheduled  Subjective:      Patient ID: Aisha Donnelly is a 48 y o  female  Chief Complaint   Patient presents with    Follow-up     Urine culture results-wmcma       She had blood in her urine a few days ago  Drinking water and tea cleared it up  She has not had another episode since  She did have kidney stones years ago  The following portions of the patient's history were reviewed and updated as appropriate:  past social history    Review of Systems      Current Outpatient Medications   Medication Sig Dispense Refill    B Complex Vitamins (VITAMIN B COMPLEX PO) Take by mouth      Biotin 1 MG CAPS Take by mouth      Cyanocobalamin (VITAMIN B 12 PO) Take by mouth      hydrochlorothiazide (HYDRODIURIL) 12 5 mg tablet Take 1 tablet (12 5 mg total) by mouth daily 90 tablet 1    Multiple Vitamins-Minerals (HAIR SKIN AND NAILS FORMULA PO) Take by mouth      Multiple Vitamins-Minerals (MULTIVITAMIN GUMMIES ADULTS PO) Take by mouth every morning        No current facility-administered medications for this visit  Objective:    /80   Pulse 89   Temp 97 7 °F (36 5 °C)   Resp 16   Wt 92 5 kg (204 lb)   SpO2 98%   BMI 33 95 kg/m²      Physical Exam   Constitutional: She appears well-developed and well-nourished  HENT:   Head: Normocephalic and atraumatic     Right Ear: External ear normal    Left Ear: External ear normal    Mouth/Throat: Oropharynx is clear and moist    Cardiovascular: Normal rate, regular rhythm and normal heart sounds  Exam reveals no friction rub  No murmur heard  Pulmonary/Chest: Effort normal and breath sounds normal  No respiratory distress  She has no wheezes  She has no rales  Musculoskeletal: She exhibits no edema or deformity  Nursing note and vitals reviewed            Jaswinder Ken DO

## 2020-01-31 ENCOUNTER — TELEPHONE (OUTPATIENT)
Dept: FAMILY MEDICINE CLINIC | Facility: CLINIC | Age: 55
End: 2020-01-31

## 2020-01-31 DIAGNOSIS — N20.0 RENAL CALCULI: Primary | ICD-10-CM

## 2020-01-31 NOTE — TELEPHONE ENCOUNTER
Patient called and had some additional questions for Dr Concha Romberg about their conversation this morning about her KUB results and following up with a CT scan      Dr Concha Romberg, Please call patient back

## 2020-02-03 NOTE — TELEPHONE ENCOUNTER
2/3/2020 8:09 AM returned call to Radha Dong  We discussed how kidney stones or form  I encouraged her to drink more water and not drink any soda  She says she does not drink any soda  Also recommend that she stop any vitamins have calcium in them  When she gets home today she is going to go through her vitamins and make sure she stops taking anything that has calcium in it      Message complete  Merline Sandoval, DO

## 2020-02-07 ENCOUNTER — HOSPITAL ENCOUNTER (OUTPATIENT)
Dept: RADIOLOGY | Facility: HOSPITAL | Age: 55
Discharge: HOME | End: 2020-02-07
Payer: COMMERCIAL

## 2020-02-07 DIAGNOSIS — N20.0 RENAL CALCULI: ICD-10-CM

## 2020-02-07 PROCEDURE — 74176 CT ABD & PELVIS W/O CONTRAST: CPT

## 2020-02-10 ENCOUNTER — TELEPHONE (OUTPATIENT)
Dept: UROLOGY | Facility: MEDICAL CENTER | Age: 55
End: 2020-02-10

## 2020-02-10 ENCOUNTER — TELEPHONE (OUTPATIENT)
Dept: FAMILY MEDICINE CLINIC | Facility: CLINIC | Age: 55
End: 2020-02-10

## 2020-02-10 DIAGNOSIS — N20.0 RENAL CALCULI: Primary | ICD-10-CM

## 2020-02-10 NOTE — TELEPHONE ENCOUNTER
New Patient please Triage     Complaint/diagnosis:Kidney Stones 3 -5 mm    Insurance pt has Jessie Erik first as insurance than [employer]    History of Cancer Breast Cancer     Previous Urologist No     Outside testing/where No     what kind of test no     Records requested/where n/a    Preferred [employer] office

## 2020-02-12 ENCOUNTER — TELEPHONE (OUTPATIENT)
Dept: UROLOGY | Facility: MEDICAL CENTER | Age: 55
End: 2020-02-12

## 2020-02-12 ENCOUNTER — TELEPHONE (OUTPATIENT)
Dept: FAMILY MEDICINE CLINIC | Facility: CLINIC | Age: 55
End: 2020-02-12

## 2020-02-12 NOTE — TELEPHONE ENCOUNTER
New patient scheduled to see Larnell Merlin on 2/20 at [facility] for ER FU calling to r/s with female practitioner  First Available at [facility] is with Kecia Schneider on 3/20      If patient needs to be seen sooner, she can be reached at [phone]

## 2020-02-12 NOTE — TELEPHONE ENCOUNTER
Stas Dodge would like to speak with Dr Pamela Ariza Only regarding her urologist apt  She had things to discuss with her      Please call Stas Dodge [phone]

## 2020-02-12 NOTE — TELEPHONE ENCOUNTER
2/12/2020 11:12 AM returned call to [facility]  She has an appointment with a Urologist next Thursday  She is questioning if she can wait until March  They have a female provider that she could see an  I let her know March of be fine  She is going to call them back and change her appointment      Message complete  Spenser Garzon, DO

## 2020-02-12 NOTE — TELEPHONE ENCOUNTER
CT impression from 2/7/20: IMPRESSION:     Multiple bilateral nonobstructive nephrolithiasis  No hydronephrosis

## 2020-03-02 ENCOUNTER — TELEPHONE (OUTPATIENT)
Dept: FAMILY MEDICINE CLINIC | Facility: CLINIC | Age: 55
End: 2020-03-02

## 2020-03-02 NOTE — TELEPHONE ENCOUNTER
Megan Hill is calling stating she is getting a bill from insurance for her CT scan on 2/7  She stated Damari Cowart is her primary care and then [place]  Looks like both were authorized before CT  She doesn't understand why she is getting billed  She spoke with Insurance but stated it is our responsibility to bill this correctly  They need more information per Megan Hill from Dr Aurora Miller  I explained our authorization process and tried explaining what her primary and secondary insurance was  She was not happy with my answers    I told her I would talk to our  and call her back

## 2020-03-02 NOTE — TELEPHONE ENCOUNTER
She needs to contact the billing office for the CT scan since it was a hospital procedure not an office visit

## 2020-03-02 NOTE — TELEPHONE ENCOUNTER
Stia Lemus was told to call the billing department    Any questions to call our office back     No further action required

## 2020-03-06 ENCOUNTER — TELEPHONE (OUTPATIENT)
Dept: FAMILY MEDICINE CLINIC | Facility: CLINIC | Age: 55
End: 2020-03-06

## 2020-03-06 NOTE — TELEPHONE ENCOUNTER
3/6/2020 3:47 PM Returned call to Geovanna  She has been stressed by work  She has been taking ZzyQuil to help her sleep  She wants to make sure she can take it  It is working well  I recommended that she keep taking it as needed      Message complete  Peyton Tate, DO

## 2020-03-18 ENCOUNTER — TELEPHONE (OUTPATIENT)
Dept: UROLOGY | Facility: CLINIC | Age: 55
End: 2020-03-18

## 2020-03-18 NOTE — TELEPHONE ENCOUNTER
Patient is currently scheduled for Friday, 3/20/2020, for new patient office visit for non-obstructing nephrolithiasis  Given current COVID-19 precautions, this appointment will need to be canceled as it is non-urgent  Please have patient rescheduled in 2-3 months  Thank you

## 2020-04-10 ENCOUNTER — TELEPHONE (OUTPATIENT)
Dept: FAMILY MEDICINE CLINIC | Facility: CLINIC | Age: 55
End: 2020-04-10

## 2020-04-15 ENCOUNTER — OFFICE VISIT (OUTPATIENT)
Dept: URGENT CARE | Facility: CLINIC | Age: 55
End: 2020-04-15
Payer: COMMERCIAL

## 2020-04-15 VITALS
BODY MASS INDEX: 33.99 KG/M2 | TEMPERATURE: 97.6 F | RESPIRATION RATE: 16 BRPM | HEIGHT: 65 IN | SYSTOLIC BLOOD PRESSURE: 140 MMHG | OXYGEN SATURATION: 100 % | DIASTOLIC BLOOD PRESSURE: 90 MMHG | HEART RATE: 77 BPM | WEIGHT: 204 LBS

## 2020-04-15 DIAGNOSIS — S01.312A EAR LOBE LACERATION, LEFT, INITIAL ENCOUNTER: Primary | ICD-10-CM

## 2020-04-15 PROCEDURE — 3077F SYST BP >= 140 MM HG: CPT | Performed by: PHYSICIAN ASSISTANT

## 2020-04-15 PROCEDURE — 1036F TOBACCO NON-USER: CPT | Performed by: PHYSICIAN ASSISTANT

## 2020-04-15 PROCEDURE — 12011 RPR F/E/E/N/L/M 2.5 CM/<: CPT | Performed by: PHYSICIAN ASSISTANT

## 2020-04-15 PROCEDURE — 3080F DIAST BP >= 90 MM HG: CPT | Performed by: PHYSICIAN ASSISTANT

## 2020-04-15 PROCEDURE — 99213 OFFICE O/P EST LOW 20 MIN: CPT | Performed by: PHYSICIAN ASSISTANT

## 2020-04-25 ENCOUNTER — OFFICE VISIT (OUTPATIENT)
Dept: URGENT CARE | Facility: CLINIC | Age: 55
End: 2020-04-25
Payer: COMMERCIAL

## 2020-04-25 VITALS
TEMPERATURE: 98.7 F | RESPIRATION RATE: 16 BRPM | DIASTOLIC BLOOD PRESSURE: 105 MMHG | SYSTOLIC BLOOD PRESSURE: 180 MMHG | OXYGEN SATURATION: 98 % | HEART RATE: 62 BPM

## 2020-04-25 DIAGNOSIS — I10 ELEVATED BLOOD PRESSURE READING IN OFFICE WITH DIAGNOSIS OF HYPERTENSION: ICD-10-CM

## 2020-04-25 DIAGNOSIS — S01.312A EAR LOBE LACERATION, LEFT, INITIAL ENCOUNTER: Primary | ICD-10-CM

## 2020-04-25 PROCEDURE — 3080F DIAST BP >= 90 MM HG: CPT | Performed by: FAMILY MEDICINE

## 2020-04-25 PROCEDURE — 99213 OFFICE O/P EST LOW 20 MIN: CPT | Performed by: FAMILY MEDICINE

## 2020-04-25 PROCEDURE — 3077F SYST BP >= 140 MM HG: CPT | Performed by: FAMILY MEDICINE

## 2020-04-25 PROCEDURE — 1036F TOBACCO NON-USER: CPT | Performed by: FAMILY MEDICINE

## 2020-04-26 ENCOUNTER — DOCUMENTATION (OUTPATIENT)
Dept: URGENT CARE | Facility: CLINIC | Age: 55
End: 2020-04-26

## 2020-04-28 ENCOUNTER — TELEPHONE (OUTPATIENT)
Dept: UROLOGY | Facility: CLINIC | Age: 55
End: 2020-04-28

## 2020-04-30 ENCOUNTER — TELEPHONE (OUTPATIENT)
Dept: UROLOGY | Facility: CLINIC | Age: 55
End: 2020-04-30

## 2020-05-01 ENCOUNTER — TELEPHONE (OUTPATIENT)
Dept: UROLOGY | Facility: CLINIC | Age: 55
End: 2020-05-01

## 2020-05-01 ENCOUNTER — OFFICE VISIT (OUTPATIENT)
Dept: URGENT CARE | Facility: CLINIC | Age: 55
End: 2020-05-01
Payer: COMMERCIAL

## 2020-05-01 VITALS
SYSTOLIC BLOOD PRESSURE: 140 MMHG | DIASTOLIC BLOOD PRESSURE: 80 MMHG | RESPIRATION RATE: 16 BRPM | HEART RATE: 87 BPM | OXYGEN SATURATION: 100 % | TEMPERATURE: 98.7 F

## 2020-05-01 DIAGNOSIS — Z48.02 ENCOUNTER FOR REMOVAL OF SUTURES: Primary | ICD-10-CM

## 2020-05-01 PROCEDURE — 1036F TOBACCO NON-USER: CPT | Performed by: NURSE PRACTITIONER

## 2020-05-01 PROCEDURE — 3077F SYST BP >= 140 MM HG: CPT | Performed by: NURSE PRACTITIONER

## 2020-05-01 PROCEDURE — 3079F DIAST BP 80-89 MM HG: CPT | Performed by: NURSE PRACTITIONER

## 2020-05-01 PROCEDURE — 99213 OFFICE O/P EST LOW 20 MIN: CPT | Performed by: NURSE PRACTITIONER

## 2020-05-03 ENCOUNTER — OFFICE VISIT (OUTPATIENT)
Dept: URGENT CARE | Facility: CLINIC | Age: 55
End: 2020-05-03
Payer: COMMERCIAL

## 2020-05-03 VITALS
DIASTOLIC BLOOD PRESSURE: 100 MMHG | SYSTOLIC BLOOD PRESSURE: 140 MMHG | RESPIRATION RATE: 16 BRPM | HEART RATE: 70 BPM | TEMPERATURE: 97.3 F | OXYGEN SATURATION: 100 %

## 2020-05-03 DIAGNOSIS — Z48.02 ENCOUNTER FOR REMOVAL OF SUTURES: Primary | ICD-10-CM

## 2020-05-03 PROCEDURE — 99213 OFFICE O/P EST LOW 20 MIN: CPT | Performed by: NURSE PRACTITIONER

## 2020-05-03 PROCEDURE — 1036F TOBACCO NON-USER: CPT | Performed by: NURSE PRACTITIONER

## 2020-05-03 PROCEDURE — 3077F SYST BP >= 140 MM HG: CPT | Performed by: NURSE PRACTITIONER

## 2020-05-03 PROCEDURE — 3080F DIAST BP >= 90 MM HG: CPT | Performed by: NURSE PRACTITIONER

## 2020-05-06 ENCOUNTER — OFFICE VISIT (OUTPATIENT)
Dept: URGENT CARE | Facility: CLINIC | Age: 55
End: 2020-05-06
Payer: COMMERCIAL

## 2020-05-06 VITALS — TEMPERATURE: 96 F | SYSTOLIC BLOOD PRESSURE: 140 MMHG | DIASTOLIC BLOOD PRESSURE: 100 MMHG

## 2020-05-06 DIAGNOSIS — S01.312D EAR LOBE LACERATION, LEFT, SUBSEQUENT ENCOUNTER: Primary | ICD-10-CM

## 2020-05-06 PROCEDURE — 99213 OFFICE O/P EST LOW 20 MIN: CPT | Performed by: PHYSICIAN ASSISTANT

## 2020-05-06 PROCEDURE — 3077F SYST BP >= 140 MM HG: CPT | Performed by: PHYSICIAN ASSISTANT

## 2020-05-06 PROCEDURE — 3080F DIAST BP >= 90 MM HG: CPT | Performed by: PHYSICIAN ASSISTANT

## 2020-05-06 PROCEDURE — 1036F TOBACCO NON-USER: CPT | Performed by: PHYSICIAN ASSISTANT

## 2020-05-11 ENCOUNTER — OFFICE VISIT (OUTPATIENT)
Dept: OTOLARYNGOLOGY | Facility: CLINIC | Age: 55
End: 2020-05-11
Payer: COMMERCIAL

## 2020-05-11 VITALS — BODY MASS INDEX: 33.95 KG/M2 | HEIGHT: 65 IN | TEMPERATURE: 98.3 F

## 2020-05-11 DIAGNOSIS — S01.312S EAR LOBE LACERATION, LEFT, SEQUELA: Primary | ICD-10-CM

## 2020-05-11 PROCEDURE — 99203 OFFICE O/P NEW LOW 30 MIN: CPT | Performed by: SPECIALIST

## 2020-05-12 ENCOUNTER — TELEPHONE (OUTPATIENT)
Dept: FAMILY MEDICINE CLINIC | Facility: CLINIC | Age: 55
End: 2020-05-12

## 2020-05-18 ENCOUNTER — OFFICE VISIT (OUTPATIENT)
Dept: OTOLARYNGOLOGY | Facility: CLINIC | Age: 55
End: 2020-05-18
Payer: COMMERCIAL

## 2020-05-18 VITALS — BODY MASS INDEX: 33.95 KG/M2 | HEIGHT: 65 IN | TEMPERATURE: 97.2 F

## 2020-05-18 DIAGNOSIS — S01.312S EAR LOBE LACERATION, LEFT, SEQUELA: Primary | ICD-10-CM

## 2020-05-18 PROCEDURE — 99213 OFFICE O/P EST LOW 20 MIN: CPT | Performed by: SPECIALIST

## 2020-05-18 PROCEDURE — 12020 TX SUPFC WND DEHSN SMPL CLSR: CPT | Performed by: SPECIALIST

## 2020-05-18 PROCEDURE — 1036F TOBACCO NON-USER: CPT | Performed by: SPECIALIST

## 2020-05-18 PROCEDURE — 3080F DIAST BP >= 90 MM HG: CPT | Performed by: SPECIALIST

## 2020-05-18 PROCEDURE — 12051 INTMD RPR FACE/MM 2.5 CM/<: CPT | Performed by: SPECIALIST

## 2020-05-18 PROCEDURE — 3077F SYST BP >= 140 MM HG: CPT | Performed by: SPECIALIST

## 2020-05-18 PROCEDURE — 12011 RPR F/E/E/N/L/M 2.5 CM/<: CPT | Performed by: SPECIALIST

## 2020-05-26 ENCOUNTER — TELEPHONE (OUTPATIENT)
Dept: OTOLARYNGOLOGY | Facility: CLINIC | Age: 55
End: 2020-05-26

## 2020-05-27 ENCOUNTER — OFFICE VISIT (OUTPATIENT)
Dept: OTOLARYNGOLOGY | Facility: CLINIC | Age: 55
End: 2020-05-27

## 2020-05-27 DIAGNOSIS — S01.312S EAR LOBE LACERATION, LEFT, SEQUELA: Primary | ICD-10-CM

## 2020-05-27 PROCEDURE — 3077F SYST BP >= 140 MM HG: CPT | Performed by: SPECIALIST

## 2020-05-27 PROCEDURE — 99024 POSTOP FOLLOW-UP VISIT: CPT | Performed by: SPECIALIST

## 2020-05-27 PROCEDURE — 3080F DIAST BP >= 90 MM HG: CPT | Performed by: SPECIALIST

## 2020-06-12 ENCOUNTER — OFFICE VISIT (OUTPATIENT)
Dept: FAMILY MEDICINE CLINIC | Facility: CLINIC | Age: 55
End: 2020-06-12
Payer: COMMERCIAL

## 2020-06-12 ENCOUNTER — TELEPHONE (OUTPATIENT)
Dept: FAMILY MEDICINE CLINIC | Facility: CLINIC | Age: 55
End: 2020-06-12

## 2020-06-12 VITALS
TEMPERATURE: 100 F | BODY MASS INDEX: 34.99 KG/M2 | HEART RATE: 84 BPM | HEIGHT: 65 IN | OXYGEN SATURATION: 97 % | DIASTOLIC BLOOD PRESSURE: 86 MMHG | WEIGHT: 210 LBS | SYSTOLIC BLOOD PRESSURE: 130 MMHG | RESPIRATION RATE: 16 BRPM

## 2020-06-12 DIAGNOSIS — Z00.00 ANNUAL PHYSICAL EXAM: Primary | ICD-10-CM

## 2020-06-12 DIAGNOSIS — Z12.31 SCREENING MAMMOGRAM, ENCOUNTER FOR: ICD-10-CM

## 2020-06-12 DIAGNOSIS — I10 ESSENTIAL HYPERTENSION: ICD-10-CM

## 2020-06-12 PROCEDURE — 99396 PREV VISIT EST AGE 40-64: CPT | Performed by: FAMILY MEDICINE

## 2020-06-23 ENCOUNTER — TELEPHONE (OUTPATIENT)
Dept: BARIATRICS | Facility: CLINIC | Age: 55
End: 2020-06-23

## 2020-06-30 ENCOUNTER — OFFICE VISIT (OUTPATIENT)
Dept: UROLOGY | Facility: CLINIC | Age: 55
End: 2020-06-30
Payer: COMMERCIAL

## 2020-06-30 VITALS
SYSTOLIC BLOOD PRESSURE: 130 MMHG | HEIGHT: 65 IN | BODY MASS INDEX: 34.95 KG/M2 | HEART RATE: 88 BPM | DIASTOLIC BLOOD PRESSURE: 82 MMHG

## 2020-06-30 DIAGNOSIS — N20.0 RENAL CALCULI: Primary | ICD-10-CM

## 2020-06-30 LAB
SL AMB  POCT GLUCOSE, UA: NORMAL
SL AMB LEUKOCYTE ESTERASE,UA: NORMAL
SL AMB POCT BILIRUBIN,UA: NORMAL
SL AMB POCT BLOOD,UA: NORMAL
SL AMB POCT CLARITY,UA: CLEAR
SL AMB POCT COLOR,UA: YELLOW
SL AMB POCT KETONES,UA: NORMAL
SL AMB POCT NITRITE,UA: NORMAL
SL AMB POCT PH,UA: 5
SL AMB POCT SPECIFIC GRAVITY,UA: 1
SL AMB POCT URINE PROTEIN: NORMAL
SL AMB POCT UROBILINOGEN: 0.2

## 2020-06-30 PROCEDURE — 1036F TOBACCO NON-USER: CPT | Performed by: PHYSICIAN ASSISTANT

## 2020-06-30 PROCEDURE — 87086 URINE CULTURE/COLONY COUNT: CPT | Performed by: PHYSICIAN ASSISTANT

## 2020-06-30 PROCEDURE — 81002 URINALYSIS NONAUTO W/O SCOPE: CPT | Performed by: PHYSICIAN ASSISTANT

## 2020-06-30 PROCEDURE — 3079F DIAST BP 80-89 MM HG: CPT | Performed by: PHYSICIAN ASSISTANT

## 2020-06-30 PROCEDURE — 3075F SYST BP GE 130 - 139MM HG: CPT | Performed by: PHYSICIAN ASSISTANT

## 2020-06-30 PROCEDURE — 99204 OFFICE O/P NEW MOD 45 MIN: CPT | Performed by: PHYSICIAN ASSISTANT

## 2020-06-30 NOTE — H&P (VIEW-ONLY)
1  Renal calculi  Ambulatory referral to Urology    POCT urine dip    Urine culture    Case request operating room: CYSTOSCOPY URETEROSCOPY WITH LITHOTRIPSY HOLMIUM LASER, RETROGRADE PYELOGRAM AND INSERTION STENT URETERAL         Assessment and plan:       1  Nephrolithiasis  -I personally reviewed with the patient her CT scan in the office today  Reviewed her bilateral intrarenal nephrolithiasis  Larger stone burden in her left kidney with approximately 1 1 cm stone in her left renal pelvis  Patient fortunately is asymptomatic   -we reviewed that due to the size of the stone she would likely not be able to pass it on her own  We reviewed surgical intervention with ESWL versus ureteroscopy  We reviewed the risks and benefits of each of these options  Patient is interested in undergoing left ureteroscopy  - the preoperative, intraoperative, and postoperative surgical course for cystoscopy, left ureteroscopy, holmium laser, basket extraction, retrograde pyelogram, and left ureteral stent insertion was reviewed  Reviewed the risks and benefits including but not limited to cardiopulmonary complication, VTE, bleeding, infection, ureteral injury, need for staged intervention, need for additional procedures  Patient verbalized understanding   - she is aware that she would need COVID-19 and urine culture testing approximately 1 week preoperatively  - she is encouraged to contact us in the meantime should she have any symptoms of a passing stone  - all questions answered  Og Gaston PA-C      Chief Complaint     New patient nephrolithiasis    History of Present Illness     Adalberto Grant is a 48 y o  female presenting today as a new patient for nephrolithiasis  Patient states that in January 2020 she had an acute onset of hematuria that was self-limiting  She was asymptomatic    She did believe that she had history of kidney stones that she had been able to passed previously on her own spontaneously  Denies any previous history of surgical intervention for nephrolithiasis  Patient had a CT stone study (02/07/2020) which revealed bilateral kidney stones  Her right kidney had largest stone measures up to 5 mm  In her left kidney the largest stone measures up to 1 1 cm  There is no evidence of obstructive uropathy  Patient denies any recurrence of hematuria  Denies any flank pain, nausea, vomiting, fevers, or chills  Denies any recurrent urinary infections  Denies any history of  surgical manipulation  Denies any use of anti coagulation  Denies any previous cardiopulmonary complications or difficulties with anesthesia  Urine dip in the office today is leukocyte nitrite negative, trace blood  Laboratory     Lab Results   Component Value Date    CREATININE 0 90 01/21/2019       Review of Systems     Review of Systems   Constitutional: Negative for activity change, appetite change, chills, diaphoresis, fatigue, fever and unexpected weight change  Respiratory: Negative for chest tightness and shortness of breath  Cardiovascular: Negative for chest pain, palpitations and leg swelling  Gastrointestinal: Negative for abdominal distention, abdominal pain, constipation, diarrhea, nausea and vomiting  Genitourinary: Negative for decreased urine volume, difficulty urinating, dysuria, enuresis, flank pain, frequency, genital sores, hematuria and urgency  Musculoskeletal: Negative for back pain, gait problem and myalgias  Skin: Negative for color change, pallor, rash and wound  Psychiatric/Behavioral: Negative for behavioral problems  The patient is not nervous/anxious  Allergies     Allergies   Allergen Reactions    Percocet [Oxycodone-Acetaminophen] Rash    Percolone [Oxycodone] Rash    Shellfish-Derived Products Rash       Physical Exam     Physical Exam   Constitutional: She is oriented to person, place, and time   She appears well-developed and well-nourished  No distress  HENT:   Head: Normocephalic and atraumatic  Right Ear: External ear normal    Left Ear: External ear normal    Nose: Nose normal    Eyes: Conjunctivae are normal  Right eye exhibits no discharge  Left eye exhibits no discharge  Neck: Normal range of motion  No tracheal deviation present  Cardiovascular: Normal rate, regular rhythm and normal heart sounds  Exam reveals no gallop and no friction rub  No murmur heard  Pulmonary/Chest: Effort normal and breath sounds normal  No stridor  No respiratory distress  She has no wheezes  Musculoskeletal: She exhibits no edema or deformity  Neurological: She is alert and oriented to person, place, and time  Skin: She is not diaphoretic  No erythema  No pallor  Psychiatric: She has a normal mood and affect   Her behavior is normal          Vital Signs     Vitals:    06/30/20 0744   BP: 130/82   Pulse: 88   Height: 5' 5" (1 651 m)         Current Medications       Current Outpatient Medications:     B Complex Vitamins (VITAMIN B COMPLEX PO), Take by mouth, Disp: , Rfl:     Biotin 1 MG CAPS, Take by mouth, Disp: , Rfl:     Cyanocobalamin (VITAMIN B 12 PO), Take by mouth, Disp: , Rfl:     hydrochlorothiazide (HYDRODIURIL) 12 5 mg tablet, Take 1 tablet (12 5 mg total) by mouth daily, Disp: 90 tablet, Rfl: 1    Multiple Vitamins-Minerals (HAIR SKIN AND NAILS FORMULA PO), Take by mouth, Disp: , Rfl:     Multiple Vitamins-Minerals (MULTIVITAMIN GUMMIES ADULTS PO), Take by mouth every morning , Disp: , Rfl:       Active Problems     Patient Active Problem List   Diagnosis    History of left breast cancer    Essential hypertension    Encounter for screening colonoscopy    Class 1 obesity    Renal calculi    Ear lobe laceration, left, sequela    Elevated blood pressure reading in office with diagnosis of hypertension         Past Medical History     Past Medical History:   Diagnosis Date    Cancer ([address] )     L breast-lumpectomy  Contact lens/glasses fitting     History of chemotherapy     History of depression     History of radiation therapy     Hypertension     Malignant neoplasm of breast ([address] ) 2006    Premenopausal menorrhagia 3/28/2014         Surgical History     Past Surgical History:   Procedure Laterality Date    BREAST LUMPECTOMY Left 2006     SECTION      x2    ENDOMETRIAL ABLATION      INTRAOPERATIVE RADIATION THERAPY ([facility])      last assessed: 7/17/15    IA COLONOSCOPY FLX DX W/COLLJ SPEC WHEN PFRMD N/A 3/22/2019    Procedure: COLONOSCOPY;  Surgeon: Lillie Mejia MD;  [facility];   Service: Gastroenterology         Family History     Family History   Problem Relation Age of Onset    Hypertension Family     Hypertension Mother     Heart disease Mother         CHF-related to smoking    ADD / ADHD Daughter     Diabetes Neg Hx     Stroke Neg Hx     Thyroid disease Neg Hx          Social History     Social History       Radiology

## 2020-07-01 LAB — BACTERIA UR CULT: NORMAL

## 2020-07-02 ENCOUNTER — TELEPHONE (OUTPATIENT)
Dept: UROLOGY | Facility: MEDICAL CENTER | Age: 55
End: 2020-07-02

## 2020-07-02 DIAGNOSIS — I10 ESSENTIAL HYPERTENSION: ICD-10-CM

## 2020-07-02 RX ORDER — HYDROCHLOROTHIAZIDE 12.5 MG/1
TABLET ORAL
Qty: 90 TABLET | Refills: 1 | Status: SHIPPED | OUTPATIENT
Start: 2020-07-02 | End: 2020-12-15

## 2020-07-02 NOTE — TELEPHONE ENCOUNTER
Patient would like to know if there is medication that she can try first to help dissolve it or help pass? Patient also requesting a call so she can further discuss and get answer live instead of back and fourth  Please advise

## 2020-07-02 NOTE — TELEPHONE ENCOUNTER
Patient at the [place] office, patient known for nephrolithiasis  Patient last appt 6/30/2020  During visit surgery discussed due to size of stone  Attempted to call patient at this time, no answer left message with office number for call back      When patient returns call please gather information on call so can be routed to the provider, thank you

## 2020-07-02 NOTE — TELEPHONE ENCOUNTER
Patient followed by Zia Calderón in [facility]  Patient has questions-no other reasons shared  She would like to discuss with Zia Calderón    She can be reached at [phone]  Thank you

## 2020-07-06 NOTE — TELEPHONE ENCOUNTER
Called and spoke with patient at this time  Advised as she has a 1 1cm kidney stone, this will not pass with medical expulsive therapy  Also advised there is no medication to help dissolve a kidney stone  Per Wellington RIGGS patient to have surgical intervention as discussed  Patient looking to schedule surgery then  As of yet no one has contacted her  Advised I will route to surgery schedulers and someone should be reaching out to her soon  Patient verbalized understanding

## 2020-07-06 NOTE — TELEPHONE ENCOUNTER
Unfortunately there are no medications that will dissolve the stone  Her stone is very large and will not pass on its own  Would recommend her continue with surgical intervention as previously discussed

## 2020-07-07 ENCOUNTER — TELEPHONE (OUTPATIENT)
Dept: UROLOGY | Facility: AMBULATORY SURGERY CENTER | Age: 55
End: 2020-07-07

## 2020-07-07 NOTE — TELEPHONE ENCOUNTER
Informed  that due to holiday and me being off Monday Patients would be called next week around the 8th  Should have been informed of this at check out  Thank you!

## 2020-07-07 NOTE — TELEPHONE ENCOUNTER
Confirmed 7/22 @ [facility] with Dr Maurice Delatorre  Instructions and testing reviewed  Patient is stating she prefers going to PCP for COVID  I did inform her of information to go to one of the sites given to ourr office per Ilia Gilmore  She was advised to have 7 days prior to surgery  Patient wishes to speak to [facility] personally  Did not advise on any questions or details to inform her of

## 2020-07-08 ENCOUNTER — ANESTHESIA EVENT (OUTPATIENT)
Dept: PERIOP | Facility: AMBULARY SURGERY CENTER | Age: 55
End: 2020-07-08
Payer: COMMERCIAL

## 2020-07-09 NOTE — TELEPHONE ENCOUNTER
Patient had question about insurance oving her surgery and how much it costs    Referred her to [facility] and provided #

## 2020-07-14 DIAGNOSIS — N20.0 RENAL CALCULI: ICD-10-CM

## 2020-07-14 PROCEDURE — U0003 INFECTIOUS AGENT DETECTION BY NUCLEIC ACID (DNA OR RNA); SEVERE ACUTE RESPIRATORY SYNDROME CORONAVIRUS 2 (SARS-COV-2) (CORONAVIRUS DISEASE [COVID-19]), AMPLIFIED PROBE TECHNIQUE, MAKING USE OF HIGH THROUGHPUT TECHNOLOGIES AS DESCRIBED BY CMS-2020-01-R: HCPCS

## 2020-07-15 LAB
INPATIENT: NORMAL
SARS-COV-2 RNA SPEC QL NAA+PROBE: NOT DETECTED

## 2020-07-16 NOTE — TELEPHONE ENCOUNTER
Called and spoke with patient at this time  Advised Wellington RIGGS has left for the day, but I can try and answer her questions  Patient amenable to speaking with nurse  She had some questions about the stent, why it is placed and how long it remains in  Reviewed possible scenarios with patient  Advised we will give concrete answers as to when the stent is removed, etc after her surgery  Dr Lyn Randolph nurse will call her post op and review everything then  Patient verbalized understanding and had no further questions at this time  She was thankful for the call back

## 2020-07-16 NOTE — PRE-PROCEDURE INSTRUCTIONS
Pre-Surgery Instructions:   Medication Instructions    B Complex Vitamins (VITAMIN B COMPLEX PO) Patient was instructed by Physician and understands   Biotin 1 MG CAPS Patient was instructed by Physician and understands   Cyanocobalamin (VITAMIN B 12 PO) Patient was instructed by Physician and understands   hydrochlorothiazide (HYDRODIURIL) 12 5 mg tablet Instructed patient per Anesthesia Guidelines   Multiple Vitamins-Minerals (HAIR SKIN AND NAILS FORMULA PO) Patient was instructed by Physician and understands   Multiple Vitamins-Minerals (MULTIVITAMIN GUMMIES ADULTS PO) Patient was instructed by Physician and understands  Pre op and bathing instructions reviewed   Pt will use antibacterial soap DOS

## 2020-07-17 ENCOUNTER — TELEPHONE (OUTPATIENT)
Dept: FAMILY MEDICINE CLINIC | Facility: CLINIC | Age: 55
End: 2020-07-17

## 2020-07-17 ENCOUNTER — TELEPHONE (OUTPATIENT)
Dept: OTOLARYNGOLOGY | Facility: CLINIC | Age: 55
End: 2020-07-17

## 2020-07-17 NOTE — TELEPHONE ENCOUNTER
7/17/2020 1:46 PM Returned call to Elvis Hunter   She wanted to let me know that she has her procedure scheduled next week      Message complete  Samra Sal, DO

## 2020-07-17 NOTE — TELEPHONE ENCOUNTER
Joseph Alberto would like to speak with Dr Lorin Schwab directly  When asked what it is in regards to, "My upcoming surgery"        [address] [place] [address] [phone]    Thank you

## 2020-07-17 NOTE — TELEPHONE ENCOUNTER
7/17/2020 4:34 PM returned call to [facility]  Her daughter told her about her diagnosis on July 4th weekend  [facility] reports to me that she has been not calm about this  She also asked me if I told her daughter to tell her about her diagnoses  I told her that I could not tell her anything about her daughter's visit  She expressed her understanding of this      Message complete  Mindy Tello, DO

## 2020-07-17 NOTE — TELEPHONE ENCOUNTER

## 2020-07-20 ENCOUNTER — OFFICE VISIT (OUTPATIENT)
Dept: OTOLARYNGOLOGY | Facility: CLINIC | Age: 55
End: 2020-07-20
Payer: COMMERCIAL

## 2020-07-20 ENCOUNTER — TELEPHONE (OUTPATIENT)
Dept: UROLOGY | Facility: MEDICAL CENTER | Age: 55
End: 2020-07-20

## 2020-07-20 VITALS — HEIGHT: 65 IN | WEIGHT: 210 LBS | TEMPERATURE: 98.7 F | BODY MASS INDEX: 34.99 KG/M2

## 2020-07-20 DIAGNOSIS — S01.312S EAR LOBE LACERATION, LEFT, SEQUELA: Primary | ICD-10-CM

## 2020-07-20 PROCEDURE — 3008F BODY MASS INDEX DOCD: CPT | Performed by: SPECIALIST

## 2020-07-20 PROCEDURE — 1036F TOBACCO NON-USER: CPT | Performed by: SPECIALIST

## 2020-07-20 PROCEDURE — 3079F DIAST BP 80-89 MM HG: CPT | Performed by: SPECIALIST

## 2020-07-20 PROCEDURE — 99213 OFFICE O/P EST LOW 20 MIN: CPT | Performed by: SPECIALIST

## 2020-07-20 PROCEDURE — 3075F SYST BP GE 130 - 139MM HG: CPT | Performed by: SPECIALIST

## 2020-07-20 NOTE — ASSESSMENT & PLAN NOTE
Left ear lobe incision well healing  No evidence Keloids at this time  Reviewed post operative expectations including incisional discomfort  Encouraged to use of ointment to incision site  Discussed on going monitoring related to procedure  Reviewed re-piercing of left lobe      Follow up in 6 weeks with re-piercing left lobe

## 2020-07-20 NOTE — TELEPHONE ENCOUNTER
Patient seen by Kenya Shell at [facility] ( Dr Nazario Lloyd)    Patient stated she paid cash for her copay on 6/30, however she received a bill for it  Asking for a call back to discuss      Patient can be reached at [phone]

## 2020-07-20 NOTE — PROGRESS NOTES
Assessment/Plan:    Ear lobe laceration, left, sequela  Left ear lobe incision well healing  No evidence Keloids at this time  Reviewed post operative expectations including incisional discomfort  Encouraged to use of ointment to incision site  Discussed on going monitoring related to procedure  Reviewed re-piercing of left lobe  Follow up in 6 weeks with re-piercing left lobe         There are no diagnoses linked to this encounter  Subjective:      Patient ID: Niya Allison is a 50 y o  female  Presents today as a follow up for post procedure due to ear lobe laceration  Laceration left ear lobe initially on 04/15/2020, revisions repair 05/18/2020  Sought care in urgent care at initial start  Feels site well healing since repair  Using antibiotic ointment and cleansing with soap and water to site  The following portions of the patient's history were reviewed and updated as appropriate: allergies, current medications, past family history, past medical history, past social history, past surgical history and problem list     Review of Systems   Constitutional: Negative  HENT: Positive for ear pain  Negative for congestion, ear discharge, hearing loss, nosebleeds, postnasal drip, rhinorrhea, sinus pressure, sinus pain, sore throat, tinnitus and voice change  Eyes: Negative  Respiratory: Negative for chest tightness and shortness of breath  Cardiovascular: Negative  Gastrointestinal: Negative  Endocrine: Negative  Musculoskeletal: Negative  Skin: Positive for wound  Negative for color change  Neurological: Negative for dizziness, numbness and headaches  Psychiatric/Behavioral: Negative  Objective:      Temp 98 7 °F (37 1 °C) (Temporal)   Ht 5' 5" (1 651 m)   Wt 95 3 kg (210 lb)   BMI 34 95 kg/m²          Physical Exam   Constitutional: She is oriented to person, place, and time  She appears well-developed and well-nourished  She is cooperative     HENT: Head: Normocephalic  Right Ear: Hearing, tympanic membrane, external ear and ear canal normal  No drainage or tenderness  Tympanic membrane is not perforated and not erythematous  No decreased hearing is noted  Left Ear: Hearing, tympanic membrane, external ear and ear canal normal  No drainage or tenderness  Tympanic membrane is not perforated and not erythematous  No decreased hearing is noted  Nose: Nose normal  No sinus tenderness, nasal deformity or septal deviation  Mouth/Throat: Uvula is midline, oropharynx is clear and moist and mucous membranes are normal  Mucous membranes are not pale and not dry  No oral lesions  Normal dentition  No oropharyngeal exudate  Well healing left ear lobe revision of laceration     Neck: Normal range of motion and full passive range of motion without pain  Neck supple  No tracheal deviation present  Cardiovascular: Normal rate  Pulmonary/Chest: Effort normal  No accessory muscle usage  No respiratory distress  Musculoskeletal:        Right shoulder: She exhibits normal range of motion  Lymphadenopathy:     She has no cervical adenopathy  Neurological: She is alert and oriented to person, place, and time  No cranial nerve deficit or sensory deficit  Skin: Skin is warm, dry and intact  Psychiatric: She has a normal mood and affect         Scribe Attestation    I,:   NONA Bosch am acting as a scribe while in the presence of the attending physician :        I,:   Fausto Olivares MD personally performed the services described in this documentation    as scribed in my presence :

## 2020-07-21 NOTE — ANESTHESIA PREPROCEDURE EVALUATION
Review of Systems/Medical History  Patient summary reviewed  Chart reviewed  No history of anesthetic complications     Cardiovascular  Exercise tolerance (METS): >4,  Hypertension ,    Pulmonary  Negative pulmonary ROS        GI/Hepatic  Negative GI/hepatic ROS          Kidney stones,        Endo/Other    Obesity    GYN    Breast cancer left lumpectomy       Hematology  Negative hematology ROS      Musculoskeletal  Negative musculoskeletal ROS        Neurology  Negative neurology ROS      Psychology   Depression ,              Physical Exam    Airway    Mallampati score: II  TM Distance: >3 FB  Neck ROM: full     Dental   No notable dental hx     Cardiovascular  Rate: normal,     Pulmonary  Pulmonary exam normal     Other Findings        Anesthesia Plan  ASA Score- 2     Anesthesia Type- general with ASA Monitors  Additional Monitors:   Airway Plan: LMA  Plan Factors-  Patient did not smoke on day of surgery  Induction- intravenous  Postoperative Plan- Plan for postoperative opioid use  Informed Consent- Anesthetic plan and risks discussed with patient  I personally reviewed this patient with the CRNA  Discussed and agreed on the Anesthesia Plan with the CRNA  Olvin Ross

## 2020-07-22 ENCOUNTER — HOSPITAL ENCOUNTER (OUTPATIENT)
Facility: AMBULARY SURGERY CENTER | Age: 55
Setting detail: OUTPATIENT SURGERY
Discharge: HOME | End: 2020-07-22
Attending: UROLOGY | Admitting: UROLOGY
Payer: COMMERCIAL

## 2020-07-22 ENCOUNTER — ANESTHESIA (OUTPATIENT)
Dept: PERIOP | Facility: AMBULARY SURGERY CENTER | Age: 55
End: 2020-07-22
Payer: COMMERCIAL

## 2020-07-22 ENCOUNTER — APPOINTMENT (OUTPATIENT)
Dept: RADIOLOGY | Facility: AMBULARY SURGERY CENTER | Age: 55
End: 2020-07-22
Payer: COMMERCIAL

## 2020-07-22 VITALS
OXYGEN SATURATION: 98 % | RESPIRATION RATE: 16 BRPM | WEIGHT: 210 LBS | HEART RATE: 66 BPM | DIASTOLIC BLOOD PRESSURE: 100 MMHG | SYSTOLIC BLOOD PRESSURE: 177 MMHG | TEMPERATURE: 97 F | BODY MASS INDEX: 34.99 KG/M2 | HEIGHT: 65 IN

## 2020-07-22 DIAGNOSIS — N20.0 RENAL CALCULI: Primary | ICD-10-CM

## 2020-07-22 PROCEDURE — C1769 GUIDE WIRE: HCPCS | Performed by: UROLOGY

## 2020-07-22 PROCEDURE — C2617 STENT, NON-COR, TEM W/O DEL: HCPCS | Performed by: UROLOGY

## 2020-07-22 PROCEDURE — 82360 CALCULUS ASSAY QUANT: CPT | Performed by: UROLOGY

## 2020-07-22 PROCEDURE — 74420 UROGRAPHY RTRGR +-KUB: CPT

## 2020-07-22 PROCEDURE — 52356 CYSTO/URETERO W/LITHOTRIPSY: CPT | Performed by: UROLOGY

## 2020-07-22 PROCEDURE — C1894 INTRO/SHEATH, NON-LASER: HCPCS | Performed by: UROLOGY

## 2020-07-22 PROCEDURE — C1758 CATHETER, URETERAL: HCPCS | Performed by: UROLOGY

## 2020-07-22 DEVICE — STENT URETERAL 6FR 24CML INLAY OPTIMA: Type: IMPLANTABLE DEVICE | Site: URETER | Status: FUNCTIONAL

## 2020-07-22 RX ORDER — HYDROCODONE BITARTRATE AND ACETAMINOPHEN 5; 325 MG/1; MG/1
1 TABLET ORAL EVERY 4 HOURS PRN
Status: DISCONTINUED | OUTPATIENT
Start: 2020-07-22 | End: 2020-07-22 | Stop reason: HOSPADM

## 2020-07-22 RX ORDER — KETOROLAC TROMETHAMINE 30 MG/ML
INJECTION, SOLUTION INTRAMUSCULAR; INTRAVENOUS AS NEEDED
Status: DISCONTINUED | OUTPATIENT
Start: 2020-07-22 | End: 2020-07-22 | Stop reason: SURG

## 2020-07-22 RX ORDER — MIDAZOLAM HYDROCHLORIDE 2 MG/2ML
INJECTION, SOLUTION INTRAMUSCULAR; INTRAVENOUS AS NEEDED
Status: DISCONTINUED | OUTPATIENT
Start: 2020-07-22 | End: 2020-07-22 | Stop reason: SURG

## 2020-07-22 RX ORDER — MAGNESIUM HYDROXIDE 1200 MG/15ML
LIQUID ORAL AS NEEDED
Status: DISCONTINUED | OUTPATIENT
Start: 2020-07-22 | End: 2020-07-22 | Stop reason: HOSPADM

## 2020-07-22 RX ORDER — PROPOFOL 10 MG/ML
INJECTION, EMULSION INTRAVENOUS AS NEEDED
Status: DISCONTINUED | OUTPATIENT
Start: 2020-07-22 | End: 2020-07-22 | Stop reason: SURG

## 2020-07-22 RX ORDER — CEFAZOLIN SODIUM 2 G/50ML
2000 SOLUTION INTRAVENOUS ONCE
Status: COMPLETED | OUTPATIENT
Start: 2020-07-22 | End: 2020-07-22

## 2020-07-22 RX ORDER — LIDOCAINE HYDROCHLORIDE 10 MG/ML
INJECTION, SOLUTION EPIDURAL; INFILTRATION; INTRACAUDAL; PERINEURAL AS NEEDED
Status: DISCONTINUED | OUTPATIENT
Start: 2020-07-22 | End: 2020-07-22 | Stop reason: SURG

## 2020-07-22 RX ORDER — OXYBUTYNIN CHLORIDE 5 MG/1
5 TABLET ORAL 3 TIMES DAILY PRN
Qty: 15 TABLET | Refills: 0 | Status: SHIPPED | OUTPATIENT
Start: 2020-07-22 | End: 2020-10-15 | Stop reason: ALTCHOICE

## 2020-07-22 RX ORDER — ONDANSETRON 2 MG/ML
4 INJECTION INTRAMUSCULAR; INTRAVENOUS ONCE AS NEEDED
Status: DISCONTINUED | OUTPATIENT
Start: 2020-07-22 | End: 2020-07-22 | Stop reason: HOSPADM

## 2020-07-22 RX ORDER — SODIUM CHLORIDE, SODIUM LACTATE, POTASSIUM CHLORIDE, CALCIUM CHLORIDE 600; 310; 30; 20 MG/100ML; MG/100ML; MG/100ML; MG/100ML
75 INJECTION, SOLUTION INTRAVENOUS CONTINUOUS
Status: DISCONTINUED | OUTPATIENT
Start: 2020-07-22 | End: 2020-07-22 | Stop reason: HOSPADM

## 2020-07-22 RX ORDER — FENTANYL CITRATE/PF 50 MCG/ML
25 SYRINGE (ML) INJECTION
Status: DISCONTINUED | OUTPATIENT
Start: 2020-07-22 | End: 2020-07-22 | Stop reason: HOSPADM

## 2020-07-22 RX ORDER — METOCLOPRAMIDE HYDROCHLORIDE 5 MG/ML
10 INJECTION INTRAMUSCULAR; INTRAVENOUS ONCE AS NEEDED
Status: DISCONTINUED | OUTPATIENT
Start: 2020-07-22 | End: 2020-07-22 | Stop reason: HOSPADM

## 2020-07-22 RX ORDER — HYDROCODONE BITARTRATE AND ACETAMINOPHEN 5; 325 MG/1; MG/1
1 TABLET ORAL EVERY 4 HOURS PRN
Qty: 20 TABLET | Refills: 0 | Status: SHIPPED | OUTPATIENT
Start: 2020-07-22 | End: 2020-08-01

## 2020-07-22 RX ORDER — ONDANSETRON 2 MG/ML
INJECTION INTRAMUSCULAR; INTRAVENOUS AS NEEDED
Status: DISCONTINUED | OUTPATIENT
Start: 2020-07-22 | End: 2020-07-22 | Stop reason: SURG

## 2020-07-22 RX ORDER — CIPROFLOXACIN 500 MG/1
500 TABLET, FILM COATED ORAL 2 TIMES DAILY
Qty: 6 TABLET | Refills: 0 | Status: SHIPPED | OUTPATIENT
Start: 2020-07-22 | End: 2020-07-25

## 2020-07-22 RX ORDER — DEXAMETHASONE SODIUM PHOSPHATE 10 MG/ML
INJECTION, SOLUTION INTRAMUSCULAR; INTRAVENOUS AS NEEDED
Status: DISCONTINUED | OUTPATIENT
Start: 2020-07-22 | End: 2020-07-22 | Stop reason: SURG

## 2020-07-22 RX ORDER — FENTANYL CITRATE 50 UG/ML
INJECTION, SOLUTION INTRAMUSCULAR; INTRAVENOUS AS NEEDED
Status: DISCONTINUED | OUTPATIENT
Start: 2020-07-22 | End: 2020-07-22 | Stop reason: SURG

## 2020-07-22 RX ORDER — SODIUM CHLORIDE, SODIUM LACTATE, POTASSIUM CHLORIDE, CALCIUM CHLORIDE 600; 310; 30; 20 MG/100ML; MG/100ML; MG/100ML; MG/100ML
INJECTION, SOLUTION INTRAVENOUS CONTINUOUS PRN
Status: DISCONTINUED | OUTPATIENT
Start: 2020-07-22 | End: 2020-07-22 | Stop reason: SURG

## 2020-07-22 RX ADMIN — FENTANYL CITRATE 25 MCG: 50 INJECTION, SOLUTION INTRAMUSCULAR; INTRAVENOUS at 11:32

## 2020-07-22 RX ADMIN — SODIUM CHLORIDE, SODIUM LACTATE, POTASSIUM CHLORIDE, AND CALCIUM CHLORIDE: .6; .31; .03; .02 INJECTION, SOLUTION INTRAVENOUS at 11:12

## 2020-07-22 RX ADMIN — LIDOCAINE HYDROCHLORIDE 50 MG: 10 INJECTION, SOLUTION EPIDURAL; INFILTRATION; INTRACAUDAL; PERINEURAL at 11:15

## 2020-07-22 RX ADMIN — CEFAZOLIN SODIUM 2000 MG: 2 SOLUTION INTRAVENOUS at 11:10

## 2020-07-22 RX ADMIN — FENTANYL CITRATE 25 MCG: 50 INJECTION INTRAMUSCULAR; INTRAVENOUS at 12:38

## 2020-07-22 RX ADMIN — PROPOFOL 200 MG: 10 INJECTION, EMULSION INTRAVENOUS at 11:15

## 2020-07-22 RX ADMIN — ONDANSETRON 4 MG: 2 INJECTION INTRAMUSCULAR; INTRAVENOUS at 11:25

## 2020-07-22 RX ADMIN — KETOROLAC TROMETHAMINE 30 MG: 30 INJECTION, SOLUTION INTRAMUSCULAR at 12:05

## 2020-07-22 RX ADMIN — FENTANYL CITRATE 50 MCG: 50 INJECTION, SOLUTION INTRAMUSCULAR; INTRAVENOUS at 11:15

## 2020-07-22 RX ADMIN — FENTANYL CITRATE 25 MCG: 50 INJECTION, SOLUTION INTRAMUSCULAR; INTRAVENOUS at 11:41

## 2020-07-22 RX ADMIN — MIDAZOLAM HYDROCHLORIDE 2 MG: 1 INJECTION, SOLUTION INTRAMUSCULAR; INTRAVENOUS at 11:12

## 2020-07-22 RX ADMIN — DEXAMETHASONE SODIUM PHOSPHATE 4 MG: 10 INJECTION, SOLUTION INTRAMUSCULAR; INTRAVENOUS at 11:25

## 2020-07-22 NOTE — INTERVAL H&P NOTE
H&P reviewed  After examining the patient I find no changes in the patients condition since the H&P had been written      Vitals:    07/22/20 1021   BP: 158/90   Pulse: 68   Resp: 18   Temp: 98 3 °F (36 8 °C)   SpO2: 98%

## 2020-07-22 NOTE — OP NOTE
OPERATIVE REPORT  PATIENT NAME: Francesca Abbott    :  1965  MRN: [id number]  Pt Location: [facility] OR ROOM 04    SURGERY DATE: 2020    Surgeon(s) and Role:     Kajal Mojica MD - Primary    Preop Diagnosis:  Renal calculi [N20 0]    Post-Op Diagnosis Codes:     * Renal calculi [N20 0]    Procedure(s) (LRB):  CYSTOSCOPY URETEROSCOPY WITH LITHOTRIPSY HIGH POWERED LASER, RETROGRADE PYELOGRAM, BASKET STONE EXTRACTION, AND INSERTION STENT URETERAL (Left)    Specimen(s):  ID Type Source Tests Collected by Time Destination   A :  Calculus Kidney, Left STONE ANALYSIS Nahomi Akins MD 2020 1155        Estimated Blood Loss:   Minimal    Drains:  Ureteral Drain/Stent Left ureter 6 Fr  (Active)   Site Assessment Other (Comment) 2020 12:12 PM   Number of days: 0       Anesthesia Type:   General    Operative Indications:  Renal calculi [N20 0]      Operative Findings:  6 mm mid pole calculus and over 1 cm lower pole calculus both fragmented  Complications:   None    Procedure and Technique:  After informed consent was obtained the patient was brought to the operating room  Preoperative antibiotics were given for infection prophylaxis  Bilateral sequential compressive devices were placed on the lower extremities for DVT prophylaxis  A timeout was performed to identify the patient, surgery to be performed, and correct laterality  The patient was then prepped and draped in standard sterile fashion in the dorsal lithotomy position  A 22 Thai rigid cystoscope was inserted per urethra and into the bladder  A diagnostic cystoscopy was performed that demonstrated no urothelial lesions  The left Ureteral orifice was identified and cannulated with a sensor guidewire up to the level of the renal pelvis under fluoroscopic guidance  A 10 Thai dual-lumen catheter was then placed over the guidewire and a second guidewire was placed into the renal pelvis   A  10-12 ureteral access sheath was then placed over one of the guidewires under fluoroscopic guidance  A 5 Czech flexible ureteroscope was then placed through the access sheath  All calyces were inspected and stones were encountered as described above  The stones were fragmented with a holmium laser  This was done until all pieces were 1 mm or less in size  A retrograde pyelogram was performed and all calyces were reinspected to ensure no large fragments were left  One of the fragments was grasped with a 0 tip nitinol basket  The ureteroscope and access sheath were backed out of the ureter and the entire length of the ureter was examined on our way out  No large stone fragments or injuries were noted  A 5 Czech open-ended ureteral catheter was then placed over the remaining guidewire and a retrograde pyelogram was performed that demonstrated moderate hydronephrosis and no filling defects  The wire was then replaced and the open-ended ureteral catheter was removed  A 6X24 stent was placed under fluoroscopic guidance over the wire  Image of the stent coiled within the renal pelvis and bladder were saved for the medical record  A string was left on the stent and it was shortened and left protruding from the meatus The patient was awoken and taken to the postanesthesia care unit in stable condition           I was present for the entire procedure    Patient Disposition:  PACU     SIGNATURE: Eber Shanks MD  DATE: July 22, 2020  TIME: 12:33 PM

## 2020-07-22 NOTE — DISCHARGE INSTRUCTIONS
CYSTOSCOPY, TURBT, PROSTATE BIOPSY, BLADDER BIOPSY   DISCHARGE INSTRUCTIONS    Care after your surgery:  1  You may have burning or red colored urine the first 24 hours  Your burning should  stop in 24 hours and your urine should clear in 24-48 hours  2  You should drink more fluids unless Dr Meka Ferreira advises you not to    3  You should return to normal activities when your urine is clear again  4  You may have a small amount of red drainage from your rectum if you had a prostate  biopsy performed  This should stop in 24 hours  5  Take pain medication as prescribed by your doctor  Call Dr Meka Ferreira if you have any of the followin  You can not urinate or you have active or persistent bleeding, clots, back pain, or  pain when you urinate  2  You have chills, fever above 101 degrees, or feel sick  3  You have persistent nausea or vomiting, persistent dizziness, or lightheadedness  4  Call Dr Meka Ferreira if you have any questions or concerns about your procedure at:  [phone]    Your stent can be removed by pulling on the string on Monday morning  The office will contact you to see if you would like a nurse to remove it    Please take pain medicine prior to removal

## 2020-07-22 NOTE — ANESTHESIA POSTPROCEDURE EVALUATION
Post-Op Assessment Note    CV Status:  Stable  Pain Score: 0    Pain management: adequate     Mental Status:  Unresponsive   Hydration Status:  Stable   PONV Controlled:  Controlled   Airway Patency:  Patent    Post Op Vitals Reviewed: No      Staff: CRNA   Comments: 02 via mask with oral airway in place          BP (P) 136/81 (07/22/20 1212)    Temp (!) (P) 97 °F (36 1 °C) (07/22/20 1212)    Pulse (P) 58 (07/22/20 1212)   Resp (P) 16 (07/22/20 1212)    SpO2 (P) 100 % (07/22/20 1212)

## 2020-07-23 ENCOUNTER — TELEPHONE (OUTPATIENT)
Dept: UROLOGY | Facility: HOSPITAL | Age: 55
End: 2020-07-23

## 2020-07-23 ENCOUNTER — TELEPHONE (OUTPATIENT)
Dept: UROLOGY | Facility: MEDICAL CENTER | Age: 55
End: 2020-07-23

## 2020-07-23 DIAGNOSIS — N20.0 NEPHROLITHIASIS: Primary | ICD-10-CM

## 2020-07-23 NOTE — TELEPHONE ENCOUNTER
Post Op Note    Adalberto Grant is a 50 y o  female s/p Cysto/stent Left performed 07/22/2020  Adalberto Grant is a patient of Dr Joselyn Park and is seen at the [facility] office  How would you rate your pain on a scale from 1 to 10, 10 being the worst pain ever? Did take some pain medication - recommend iboprofen  Have you had a fever? No  IHave your bowel movements been regular? N  Do you have any difficulty urinating? Some burning  Do you have any other questions or concerns that I can address at this time?  Is having stent removal at [facility] [place] on Monday

## 2020-07-23 NOTE — TELEPHONE ENCOUNTER
Patient [facility] seen [facility] at [facility]   Patient called in to schedule a stent removal   Patient can be reached at [phone]

## 2020-07-24 NOTE — TELEPHONE ENCOUNTER
Patient calling with question regards to abx  Asking for a call back to discuss      She can be reached at [phone]

## 2020-07-24 NOTE — TELEPHONE ENCOUNTER
Called Teodoro Quigley back and she was just checking that she could take Alieve PM  She states that she has been taking the [place] doing the day and she is doing great  She has been able to have BM's-eating fruit and hydrating  She will present to [facility] on Monday for stent removal   She had said she thought she felt string  Told her if she accidentally moved it some - she may have some leaking

## 2020-07-27 ENCOUNTER — PROCEDURE VISIT (OUTPATIENT)
Dept: UROLOGY | Facility: CLINIC | Age: 55
End: 2020-07-27
Payer: COMMERCIAL

## 2020-07-27 VITALS — BODY MASS INDEX: 34.99 KG/M2 | RESPIRATION RATE: 18 BRPM | HEIGHT: 65 IN | WEIGHT: 210 LBS | TEMPERATURE: 97.2 F

## 2020-07-27 DIAGNOSIS — N20.0 NEPHROLITHIASIS: Primary | ICD-10-CM

## 2020-07-27 PROCEDURE — 3080F DIAST BP >= 90 MM HG: CPT

## 2020-07-27 PROCEDURE — 99211 OFF/OP EST MAY X REQ PHY/QHP: CPT

## 2020-07-27 PROCEDURE — 3077F SYST BP >= 140 MM HG: CPT

## 2020-07-27 NOTE — PROGRESS NOTES
7/27/2020  Malu Santo is a 50 y o  female  [id number]        Diagnosis  Chief Complaint     Nephrolithiasis          Patient is s/p left ureteroscopy with stone extraction on 7/22/20 with Dr Addy John  Patient will follow up as scheduled in 3 months with renal US and KUB prior to appointment  Procedure Stent with String Removal    Vitals:    07/27/20 0940   Resp: 18   Temp: (!) 97 2 °F (36 2 °C)   Weight: 95 3 kg (210 lb)   Height: 5' 5" (1 651 m)       Stent with string removed intact without difficulty  Reviewed post stent removal symptoms including flank pain, dysuria, and hematuria  Instructed patient to increase oral fluid intake  Encouraged the use of NSAIDS and other prescribed pain medication as needed for discomfort  Patient instructed to call the office or report to the ER for uncontrolled pain, fever, chills, nausea or vomiting         Alan Abbasi RN BSN

## 2020-08-05 ENCOUNTER — TELEPHONE (OUTPATIENT)
Dept: UROLOGY | Facility: MEDICAL CENTER | Age: 55
End: 2020-08-05

## 2020-08-05 NOTE — TELEPHONE ENCOUNTER
Tristin Marrufo        8/5/20 3:18 PM   Note      Patient of Dr Kevin Chatterjee seen in [place] office  Patient calling to see if results for stone analysis are back    Patient requesting call back once results are in

## 2020-08-05 NOTE — TELEPHONE ENCOUNTER
Called and spoke to patient at this time advised stone analysis is still processing, advised office will monitor for results and someone from office will contact her with results     Patient verbalized understanding and is thankful for call back

## 2020-08-05 NOTE — TELEPHONE ENCOUNTER
Patient of Dr Pati Pandey seen in [facility] [place] office  Patient calling to see if results for stone analysis are back    Patient requesting call back once results are in

## 2020-08-07 LAB
COLOR STONE: NORMAL
COM MFR STONE: 100 %
COMMENT-STONE3: NORMAL
COMPOSITION: NORMAL
LABORATORY COMMENT REPORT: NORMAL
PHOTO: NORMAL
SIZE STONE: NORMAL MM
SPEC SOURCE SUBJ: NORMAL
STONE ANALYSIS-IMP: NORMAL
WT STONE: 66 MG

## 2020-08-10 ENCOUNTER — HOSPITAL ENCOUNTER (OUTPATIENT)
Dept: RADIOLOGY | Facility: HOSPITAL | Age: 55
Discharge: HOME | End: 2020-08-10
Payer: COMMERCIAL

## 2020-08-10 VITALS — WEIGHT: 206 LBS | BODY MASS INDEX: 34.32 KG/M2 | HEIGHT: 65 IN

## 2020-08-10 DIAGNOSIS — Z12.31 SCREENING MAMMOGRAM, ENCOUNTER FOR: ICD-10-CM

## 2020-08-10 PROCEDURE — 77063 BREAST TOMOSYNTHESIS BI: CPT

## 2020-08-10 PROCEDURE — 77067 SCR MAMMO BI INCL CAD: CPT

## 2020-08-10 NOTE — TELEPHONE ENCOUNTER
Called and spoke to patient at this time, advised stone was 100% calcium oxalate  Provided dietary guidelines to help with the formation of stones as advised on aggressive hydration as well      Patient verbalized understanding and is thankful for call

## 2020-08-11 NOTE — PROGRESS NOTES
Weight Management Medical Nutrition Assessment  Name was verified by patient stating name? Yes   verified by patient stating? Yes  Verified the patient is alone? Yes  Offered patient a live visit but are now consenting to this virtual visit? Yes  Verified with the patient she will be contacted at the end of the visit to collect payment? Yes     Elsi Shetty is present for meal planning  Seen by PA 19  Did not wish to report weight but based on medical record has maintained weight over the last 6 1/2 months  Recently with kidney stones and is looking for information r/t stones and not specifically weight loss  Meal plan not provided as she had more specific questions vs overall plan  Did give calorie guidelines  Patient seen by Medical Provider in past 6 months:  no  Requested to schedule appointment with Medical Provider: Yes    Anthropometric Measurements  Start Weight (#): 204 8 lbs 19  Current Weight (#): did not wish to state  TBW % Change from start weight:   Ideal Body Weight (#): 125 lbs  Goal Weight (#):  lbs  Highest:  Lowest:     Weight Loss History  Previous weight loss attempts: Exercise  Self Created Diets (Portion Control, Healthy Food Choices, etc )    Food and Nutrition Related History     Food Recall  Breakfast: 9:30-10:00: smoothie with oikos triple zero vanilla yogurt, fruit, collagen    Snack:   Lunch: 1-1:30 salad w/chicken  Snack: peanuts  Dinner: 8:00 tacos w/ground turkey  Snack: varies    Beverages: water and coffee/tea  Volume of beverage intake:      Weekends: Same  Cravings:   Trouble area of day:     Frequency of Eating out: varies  Food restrictions: shellfish  Cooking: self   Food Shopping: self    Physical Activity Intake  Activity:Aerobics  Frequency:several times per week  Physical limitations/barriers to exercise: n/a    Estimated Needs  Energy  Bear Melanie Energy Needs:  BMR : 1886   1-2# loss weekly sedentary:  [id number]             1-2# loss weekly lightly active: 7475-3779  Protein: 68-85 gm     (1 2-1 5g/kg IBW)  Fluid: 66 oz     (35mL/kg IBW)    Nutrition Diagnosis  Yes; Overweight/obesity  related to Excess energy intake as evidenced by  BMI more than normative standard for age and sex (obesity-grade I 26-30  9)       Nutrition Intervention    Nutrition Prescription  Calories: 2067-0062 calories  Protein: 70 gm  Fluid: as per urologist    Nutrition Education:     low/high oxalate foods  Lean protein food choices  Healthy snack options  Food journaling tips    Nutrition Counseling:  Strategies: meal planning, portion sizes, healthy snack choices, hydration, fiber intake, protein intake, exercise, food journal    Monitoring and Evaluation:  Evaluation criteria:  Energy Intake  Meet protein needs  Maintain adequate hydration  Monitor weekly weight  Meal planning/preparation  Food journal   Decreased portions at mealtimes and snacks  Physical activity     Barriers to learning:none  Readiness to change: Preparation:  (Getting ready to change)   Comprehension: good  Expected Compliance: good

## 2020-08-14 ENCOUNTER — OFFICE VISIT (OUTPATIENT)
Dept: BARIATRICS | Facility: CLINIC | Age: 55
End: 2020-08-14

## 2020-08-14 DIAGNOSIS — R63.5 ABNORMAL WEIGHT GAIN: ICD-10-CM

## 2020-08-14 PROCEDURE — WMDI30

## 2020-08-14 PROCEDURE — RECHECK

## 2020-08-21 ENCOUNTER — TELEPHONE (OUTPATIENT)
Dept: FAMILY MEDICINE CLINIC | Facility: CLINIC | Age: 55
End: 2020-08-21

## 2020-08-26 ENCOUNTER — TELEPHONE (OUTPATIENT)
Dept: FAMILY MEDICINE CLINIC | Facility: CLINIC | Age: 55
End: 2020-08-26

## 2020-08-26 NOTE — TELEPHONE ENCOUNTER
Meena Yusuf calling to speak with DR Meena Galvin  Nothing urgent, she has a couple of questions  She stated she would mychart Dr Mau Cheng too

## 2020-08-26 NOTE — TELEPHONE ENCOUNTER
8/26/2020 2:20 PM returned call to [facility]  She has not been sleeping due to stress at work  She has been using NyQuil to help her  Recommend that she start using melatonin  Recommended up to 3 mg 30-60 minutes before bedtime  We also discussed sleep hygiene, daily exercise and avoiding bright light in our before bed      Message jermaine Boyer,

## 2020-08-31 ENCOUNTER — OFFICE VISIT (OUTPATIENT)
Dept: OTOLARYNGOLOGY | Facility: CLINIC | Age: 55
End: 2020-08-31
Payer: COMMERCIAL

## 2020-08-31 VITALS — BODY MASS INDEX: 34.32 KG/M2 | TEMPERATURE: 97.9 F | WEIGHT: 206 LBS | HEIGHT: 65 IN

## 2020-08-31 DIAGNOSIS — S01.312S EAR LOBE LACERATION, LEFT, SEQUELA: ICD-10-CM

## 2020-08-31 DIAGNOSIS — Z41.3: Primary | ICD-10-CM

## 2020-08-31 PROCEDURE — 3008F BODY MASS INDEX DOCD: CPT

## 2020-08-31 PROCEDURE — 99213 OFFICE O/P EST LOW 20 MIN: CPT | Performed by: SPECIALIST

## 2020-08-31 NOTE — ASSESSMENT & PLAN NOTE
Left ear lobe piercing  Applied Emla cream to left ear lobe for 30 minutes  Site marked by patient  Piercing completed with device without difficulty      Follow up prn

## 2020-08-31 NOTE — PROGRESS NOTES
Assessment/Plan:    Encounter for left ear piercing  Left ear lobe piercing  Applied Emla cream to left ear lobe for 30 minutes  Site marked by patient  Piercing completed with device without difficulty  Follow up prn     Ear lobe laceration, left, sequela  Well healed repair of left ear lobe laceration         Diagnoses and all orders for this visit:    Encounter for left ear piercing    Ear lobe laceration, left, sequela          Subjective:      Patient ID: Estrella Paniagua is a 50 y o  female  Presents today as a follow up for post procedure due to ear lobe laceration  Laceration left ear lobe initially on 04/15/2020, revisions repair 05/18/2020  Sought care in urgent care at initial start  Feels site well healing since repair  Presents for ear piercing left lobe today      The following portions of the patient's history were reviewed and updated as appropriate: allergies, current medications, past family history, past medical history, past social history, past surgical history and problem list     Review of Systems   Constitutional: Negative  HENT: Positive for ear pain  Negative for congestion, ear discharge, hearing loss, nosebleeds, postnasal drip, rhinorrhea, sinus pressure, sinus pain, sore throat, tinnitus and voice change  Eyes: Negative  Respiratory: Negative for chest tightness and shortness of breath  Cardiovascular: Negative  Gastrointestinal: Negative  Endocrine: Negative  Musculoskeletal: Negative  Skin: Negative for color change  Neurological: Negative for dizziness, numbness and headaches  Psychiatric/Behavioral: Negative  Objective:      Temp 97 9 °F (36 6 °C) (Temporal)   Ht 5' 5" (1 651 m)   Wt 93 4 kg (206 lb)   BMI 34 28 kg/m²          Physical Exam  Constitutional:       Appearance: She is well-developed  HENT:      Head: Normocephalic        Right Ear: Hearing, tympanic membrane, ear canal and external ear normal  No decreased hearing noted  No drainage or tenderness  Tympanic membrane is not perforated or erythematous  Left Ear: Hearing, tympanic membrane, ear canal and external ear normal  No decreased hearing noted  No drainage or tenderness  Tympanic membrane is not perforated or erythematous  Ears:      Comments: Left ear lobe site well healed       Nose: Nose normal  No nasal deformity or septal deviation  Mouth/Throat:      Mouth: Mucous membranes are not pale and not dry  No oral lesions  Dentition: Normal dentition  Pharynx: Uvula midline  No oropharyngeal exudate  Neck:      Musculoskeletal: Full passive range of motion without pain, normal range of motion and neck supple  Trachea: No tracheal deviation  Cardiovascular:      Rate and Rhythm: Normal rate  Pulmonary:      Effort: Pulmonary effort is normal  No accessory muscle usage or respiratory distress  Musculoskeletal:      Right shoulder: She exhibits normal range of motion  Lymphadenopathy:      Cervical: No cervical adenopathy  Skin:     General: Skin is warm and dry  Neurological:      Mental Status: She is alert and oriented to person, place, and time  Cranial Nerves: No cranial nerve deficit  Sensory: No sensory deficit  Psychiatric:         Behavior: Behavior is cooperative           Scribe Attestation    I,:   NONA Tejada am acting as a scribe while in the presence of the attending physician :        I,:   Dai Alonzo MD personally performed the services described in this documentation    as scribed in my presence :

## 2020-09-15 ENCOUNTER — TELEPHONE (OUTPATIENT)
Dept: FAMILY MEDICINE CLINIC | Facility: CLINIC | Age: 55
End: 2020-09-15

## 2020-09-15 NOTE — TELEPHONE ENCOUNTER
9/15/2020 11:17 AM returned call to Oneyda Vela she was wondering what she can do to improve her  Memory  I recommended that increase her exercise       Message complete  Fela Tamez, DO

## 2020-10-02 ENCOUNTER — TELEPHONE (OUTPATIENT)
Dept: FAMILY MEDICINE CLINIC | Facility: CLINIC | Age: 55
End: 2020-10-02

## 2020-10-02 DIAGNOSIS — M54.2 NECK PAIN: Primary | ICD-10-CM

## 2020-10-02 RX ORDER — CYCLOBENZAPRINE HCL 5 MG
5 TABLET ORAL
Qty: 30 TABLET | Refills: 3 | Status: SHIPPED | OUTPATIENT
Start: 2020-10-02 | End: 2021-05-20 | Stop reason: ALTCHOICE

## 2020-10-13 ENCOUNTER — HOSPITAL ENCOUNTER (OUTPATIENT)
Dept: RADIOLOGY | Facility: HOSPITAL | Age: 55
Discharge: HOME | End: 2020-10-13
Payer: COMMERCIAL

## 2020-10-13 DIAGNOSIS — N20.0 NEPHROLITHIASIS: ICD-10-CM

## 2020-10-13 PROCEDURE — 51798 US URINE CAPACITY MEASURE: CPT

## 2020-10-14 ENCOUNTER — TELEPHONE (OUTPATIENT)
Dept: UROLOGY | Facility: CLINIC | Age: 55
End: 2020-10-14

## 2020-10-15 ENCOUNTER — TELEPHONE (OUTPATIENT)
Dept: FAMILY MEDICINE CLINIC | Facility: CLINIC | Age: 55
End: 2020-10-15

## 2020-10-16 ENCOUNTER — TRANSCRIBE ORDERS (OUTPATIENT)
Dept: ADMINISTRATIVE | Facility: HOSPITAL | Age: 55
End: 2020-10-16

## 2020-10-17 ENCOUNTER — TRANSCRIBE ORDERS (OUTPATIENT)
Dept: ADMINISTRATIVE | Facility: HOSPITAL | Age: 55
End: 2020-10-17

## 2020-10-17 ENCOUNTER — HOSPITAL ENCOUNTER (OUTPATIENT)
Dept: RADIOLOGY | Facility: HOSPITAL | Age: 55
Discharge: HOME | End: 2020-10-17
Payer: COMMERCIAL

## 2020-10-17 DIAGNOSIS — N20.0 NEPHROLITHIASIS: ICD-10-CM

## 2020-10-17 PROCEDURE — 74018 RADEX ABDOMEN 1 VIEW: CPT

## 2020-10-20 ENCOUNTER — OFFICE VISIT (OUTPATIENT)
Dept: UROLOGY | Facility: CLINIC | Age: 55
End: 2020-10-20
Payer: COMMERCIAL

## 2020-10-20 VITALS
HEIGHT: 65 IN | SYSTOLIC BLOOD PRESSURE: 152 MMHG | BODY MASS INDEX: 34.28 KG/M2 | DIASTOLIC BLOOD PRESSURE: 90 MMHG | TEMPERATURE: 97.3 F

## 2020-10-20 DIAGNOSIS — N20.0 NEPHROLITHIASIS: Primary | ICD-10-CM

## 2020-10-20 LAB
SL AMB  POCT GLUCOSE, UA: NORMAL
SL AMB LEUKOCYTE ESTERASE,UA: NORMAL
SL AMB POCT BILIRUBIN,UA: NORMAL
SL AMB POCT BLOOD,UA: NORMAL
SL AMB POCT CLARITY,UA: CLEAR
SL AMB POCT COLOR,UA: YELLOW
SL AMB POCT KETONES,UA: NORMAL
SL AMB POCT NITRITE,UA: NORMAL
SL AMB POCT PH,UA: 6
SL AMB POCT SPECIFIC GRAVITY,UA: 1.01
SL AMB POCT URINE PROTEIN: NORMAL
SL AMB POCT UROBILINOGEN: 0.2

## 2020-10-20 PROCEDURE — 99213 OFFICE O/P EST LOW 20 MIN: CPT | Performed by: PHYSICIAN ASSISTANT

## 2020-10-20 PROCEDURE — 1036F TOBACCO NON-USER: CPT | Performed by: PHYSICIAN ASSISTANT

## 2020-10-20 PROCEDURE — 81002 URINALYSIS NONAUTO W/O SCOPE: CPT | Performed by: PHYSICIAN ASSISTANT

## 2020-10-20 PROCEDURE — 3080F DIAST BP >= 90 MM HG: CPT | Performed by: PHYSICIAN ASSISTANT

## 2020-11-05 ENCOUNTER — TELEPHONE (OUTPATIENT)
Dept: OBGYN CLINIC | Facility: MEDICAL CENTER | Age: 55
End: 2020-11-05

## 2020-11-05 ENCOUNTER — APPOINTMENT (OUTPATIENT)
Dept: RADIOLOGY | Facility: CLINIC | Age: 55
End: 2020-11-05
Payer: COMMERCIAL

## 2020-11-05 ENCOUNTER — OFFICE VISIT (OUTPATIENT)
Dept: OBGYN CLINIC | Facility: CLINIC | Age: 55
End: 2020-11-05
Payer: COMMERCIAL

## 2020-11-05 VITALS
DIASTOLIC BLOOD PRESSURE: 80 MMHG | BODY MASS INDEX: 33.85 KG/M2 | HEART RATE: 80 BPM | WEIGHT: 203.2 LBS | SYSTOLIC BLOOD PRESSURE: 140 MMHG | HEIGHT: 65 IN

## 2020-11-05 DIAGNOSIS — M25.561 RIGHT KNEE PAIN, UNSPECIFIED CHRONICITY: ICD-10-CM

## 2020-11-05 DIAGNOSIS — M94.261 CHONDROMALACIA, RIGHT KNEE: ICD-10-CM

## 2020-11-05 DIAGNOSIS — M25.561 ACUTE PAIN OF RIGHT KNEE: Primary | ICD-10-CM

## 2020-11-05 PROCEDURE — 99203 OFFICE O/P NEW LOW 30 MIN: CPT | Performed by: ORTHOPAEDIC SURGERY

## 2020-11-05 PROCEDURE — 3008F BODY MASS INDEX DOCD: CPT | Performed by: SPECIALIST

## 2020-11-05 PROCEDURE — 73562 X-RAY EXAM OF KNEE 3: CPT

## 2020-11-05 PROCEDURE — 3077F SYST BP >= 140 MM HG: CPT | Performed by: ORTHOPAEDIC SURGERY

## 2020-11-05 PROCEDURE — 3079F DIAST BP 80-89 MM HG: CPT | Performed by: ORTHOPAEDIC SURGERY

## 2020-11-05 RX ORDER — ACETAMINOPHEN 325 MG/1
650 TABLET ORAL EVERY 6 HOURS PRN
COMMUNITY

## 2020-11-16 ENCOUNTER — EVALUATION (OUTPATIENT)
Dept: PHYSICAL THERAPY | Facility: CLINIC | Age: 55
End: 2020-11-16
Payer: COMMERCIAL

## 2020-11-16 ENCOUNTER — OFFICE VISIT (OUTPATIENT)
Dept: OTOLARYNGOLOGY | Facility: CLINIC | Age: 55
End: 2020-11-16
Payer: COMMERCIAL

## 2020-11-16 VITALS — TEMPERATURE: 97.9 F

## 2020-11-16 DIAGNOSIS — M25.561 ACUTE PAIN OF RIGHT KNEE: Primary | ICD-10-CM

## 2020-11-16 DIAGNOSIS — M94.261 CHONDROMALACIA, RIGHT KNEE: ICD-10-CM

## 2020-11-16 DIAGNOSIS — S01.312S EAR LOBE LACERATION, LEFT, SEQUELA: Primary | ICD-10-CM

## 2020-11-16 PROCEDURE — 97161 PT EVAL LOW COMPLEX 20 MIN: CPT

## 2020-11-16 PROCEDURE — 99213 OFFICE O/P EST LOW 20 MIN: CPT | Performed by: SPECIALIST

## 2020-11-16 PROCEDURE — 97112 NEUROMUSCULAR REEDUCATION: CPT

## 2020-11-16 PROCEDURE — 1036F TOBACCO NON-USER: CPT | Performed by: SPECIALIST

## 2020-11-24 ENCOUNTER — OFFICE VISIT (OUTPATIENT)
Dept: PHYSICAL THERAPY | Facility: CLINIC | Age: 55
End: 2020-11-24
Payer: COMMERCIAL

## 2020-11-24 DIAGNOSIS — M94.261 CHONDROMALACIA, RIGHT KNEE: ICD-10-CM

## 2020-11-24 DIAGNOSIS — M25.561 ACUTE PAIN OF RIGHT KNEE: Primary | ICD-10-CM

## 2020-11-24 PROCEDURE — 97112 NEUROMUSCULAR REEDUCATION: CPT

## 2020-11-24 PROCEDURE — 97110 THERAPEUTIC EXERCISES: CPT

## 2020-11-30 ENCOUNTER — ANNUAL EXAM (OUTPATIENT)
Dept: OBGYN CLINIC | Facility: CLINIC | Age: 55
End: 2020-11-30
Payer: COMMERCIAL

## 2020-11-30 VITALS
DIASTOLIC BLOOD PRESSURE: 90 MMHG | WEIGHT: 204 LBS | SYSTOLIC BLOOD PRESSURE: 160 MMHG | TEMPERATURE: 97.2 F | BODY MASS INDEX: 33.95 KG/M2

## 2020-11-30 DIAGNOSIS — Z01.419 WELL WOMAN EXAM: Primary | ICD-10-CM

## 2020-11-30 PROCEDURE — 1036F TOBACCO NON-USER: CPT | Performed by: PHYSICIAN ASSISTANT

## 2020-11-30 PROCEDURE — 99396 PREV VISIT EST AGE 40-64: CPT | Performed by: PHYSICIAN ASSISTANT

## 2020-12-08 ENCOUNTER — OFFICE VISIT (OUTPATIENT)
Dept: PHYSICAL THERAPY | Facility: CLINIC | Age: 55
End: 2020-12-08
Payer: COMMERCIAL

## 2020-12-08 DIAGNOSIS — M94.261 CHONDROMALACIA, RIGHT KNEE: ICD-10-CM

## 2020-12-08 DIAGNOSIS — M25.561 ACUTE PAIN OF RIGHT KNEE: Primary | ICD-10-CM

## 2020-12-08 PROCEDURE — 97110 THERAPEUTIC EXERCISES: CPT

## 2020-12-15 DIAGNOSIS — I10 ESSENTIAL HYPERTENSION: ICD-10-CM

## 2020-12-15 RX ORDER — HYDROCHLOROTHIAZIDE 12.5 MG/1
TABLET ORAL
Qty: 90 TABLET | Refills: 1 | Status: SHIPPED | OUTPATIENT
Start: 2020-12-15 | End: 2021-06-10

## 2020-12-22 ENCOUNTER — OFFICE VISIT (OUTPATIENT)
Dept: PHYSICAL THERAPY | Facility: CLINIC | Age: 55
End: 2020-12-22
Payer: COMMERCIAL

## 2020-12-22 DIAGNOSIS — M25.561 ACUTE PAIN OF RIGHT KNEE: Primary | ICD-10-CM

## 2020-12-22 DIAGNOSIS — M94.261 CHONDROMALACIA, RIGHT KNEE: ICD-10-CM

## 2020-12-22 PROCEDURE — 97110 THERAPEUTIC EXERCISES: CPT

## 2020-12-22 PROCEDURE — 97112 NEUROMUSCULAR REEDUCATION: CPT

## 2020-12-24 ENCOUNTER — OFFICE VISIT (OUTPATIENT)
Dept: FAMILY MEDICINE CLINIC | Facility: CLINIC | Age: 55
End: 2020-12-24
Payer: COMMERCIAL

## 2020-12-24 VITALS
SYSTOLIC BLOOD PRESSURE: 138 MMHG | HEART RATE: 67 BPM | HEIGHT: 65 IN | OXYGEN SATURATION: 98 % | DIASTOLIC BLOOD PRESSURE: 80 MMHG | TEMPERATURE: 98.1 F | BODY MASS INDEX: 34.02 KG/M2 | WEIGHT: 204.2 LBS | RESPIRATION RATE: 18 BRPM

## 2020-12-24 DIAGNOSIS — I10 ESSENTIAL HYPERTENSION: Primary | ICD-10-CM

## 2020-12-24 LAB
ALBUMIN SERPL-MCNC: 4.2 G/DL (ref 3.8–4.9)
ALBUMIN/GLOB SERPL: 1.3 {RATIO} (ref 1.2–2.2)
ALP SERPL-CCNC: 79 IU/L (ref 39–117)
ALT SERPL-CCNC: 17 IU/L (ref 0–32)
AST SERPL-CCNC: 27 IU/L (ref 0–40)
BASOPHILS # BLD AUTO: 0 X10E3/UL (ref 0–0.2)
BASOPHILS NFR BLD AUTO: 1 %
BILIRUB SERPL-MCNC: 0.4 MG/DL (ref 0–1.2)
BUN SERPL-MCNC: 19 MG/DL (ref 6–24)
BUN/CREAT SERPL: 20 (ref 9–23)
CALCIUM SERPL-MCNC: 9.5 MG/DL (ref 8.7–10.2)
CHLORIDE SERPL-SCNC: 102 MMOL/L (ref 96–106)
CO2 SERPL-SCNC: 26 MMOL/L (ref 20–29)
CREAT SERPL-MCNC: 0.93 MG/DL (ref 0.57–1)
EOSINOPHIL # BLD AUTO: 0.1 X10E3/UL (ref 0–0.4)
EOSINOPHIL NFR BLD AUTO: 2 %
ERYTHROCYTE [DISTWIDTH] IN BLOOD BY AUTOMATED COUNT: 13 % (ref 11.7–15.4)
GLOBULIN SER-MCNC: 3.2 G/DL (ref 1.5–4.5)
GLUCOSE SERPL-MCNC: 90 MG/DL (ref 65–99)
HCT VFR BLD AUTO: 36.6 % (ref 34–46.6)
HGB BLD-MCNC: 12.3 G/DL (ref 11.1–15.9)
IMM GRANULOCYTES # BLD: 0 X10E3/UL (ref 0–0.1)
IMM GRANULOCYTES NFR BLD: 0 %
LYMPHOCYTES # BLD AUTO: 1.6 X10E3/UL (ref 0.7–3.1)
LYMPHOCYTES NFR BLD AUTO: 26 %
MCH RBC QN AUTO: 28.4 PG (ref 26.6–33)
MCHC RBC AUTO-ENTMCNC: 33.6 G/DL (ref 31.5–35.7)
MCV RBC AUTO: 85 FL (ref 79–97)
MONOCYTES # BLD AUTO: 0.6 X10E3/UL (ref 0.1–0.9)
MONOCYTES NFR BLD AUTO: 10 %
NEUTROPHILS # BLD AUTO: 3.7 X10E3/UL (ref 1.4–7)
NEUTROPHILS NFR BLD AUTO: 61 %
PLATELET # BLD AUTO: 182 X10E3/UL (ref 150–450)
POTASSIUM SERPL-SCNC: 4.2 MMOL/L (ref 3.5–5.2)
PROT SERPL-MCNC: 7.4 G/DL (ref 6–8.5)
RBC # BLD AUTO: 4.33 X10E6/UL (ref 3.77–5.28)
SL AMB EGFR AFRICAN AMERICAN: 81 ML/MIN/1.73
SL AMB EGFR NON AFRICAN AMERICAN: 70 ML/MIN/1.73
SODIUM SERPL-SCNC: 141 MMOL/L (ref 134–144)
TSH SERPL DL<=0.005 MIU/L-ACNC: 1.42 UIU/ML (ref 0.45–4.5)
WBC # BLD AUTO: 6 X10E3/UL (ref 3.4–10.8)

## 2020-12-24 PROCEDURE — 3008F BODY MASS INDEX DOCD: CPT | Performed by: FAMILY MEDICINE

## 2020-12-24 PROCEDURE — 1036F TOBACCO NON-USER: CPT | Performed by: FAMILY MEDICINE

## 2020-12-24 PROCEDURE — 3079F DIAST BP 80-89 MM HG: CPT | Performed by: FAMILY MEDICINE

## 2020-12-24 PROCEDURE — 3075F SYST BP GE 130 - 139MM HG: CPT | Performed by: FAMILY MEDICINE

## 2020-12-24 PROCEDURE — 99213 OFFICE O/P EST LOW 20 MIN: CPT | Performed by: FAMILY MEDICINE

## 2021-01-05 ENCOUNTER — OFFICE VISIT (OUTPATIENT)
Dept: FAMILY MEDICINE CLINIC | Facility: CLINIC | Age: 56
End: 2021-01-05
Payer: COMMERCIAL

## 2021-01-05 VITALS
RESPIRATION RATE: 20 BRPM | DIASTOLIC BLOOD PRESSURE: 84 MMHG | HEART RATE: 60 BPM | WEIGHT: 208 LBS | SYSTOLIC BLOOD PRESSURE: 140 MMHG | HEIGHT: 65 IN | BODY MASS INDEX: 34.66 KG/M2 | TEMPERATURE: 97 F | OXYGEN SATURATION: 99 %

## 2021-01-05 DIAGNOSIS — M54.50 ACUTE RIGHT-SIDED LOW BACK PAIN WITHOUT SCIATICA: Primary | ICD-10-CM

## 2021-01-05 PROCEDURE — 99213 OFFICE O/P EST LOW 20 MIN: CPT | Performed by: FAMILY MEDICINE

## 2021-01-05 NOTE — PROGRESS NOTES
Assessment/Plan:    1  Acute right-sided low back pain without sciatica  Comments:  related to trauma  Aleve as needed           There are no Patient Instructions on file for this visit  Return if symptoms worsen or fail to improve  Subjective:      Patient ID: Chris Wolf is a 50 y o  female  Chief Complaint   Patient presents with    fell on parking lot     01/03/2021 [employer] in [address] [place] [address] Rd       She slipped and fell outside the [employer] store on 1/3/21  She was stiff that night  She has needed Aleve for the pain  She slipped and landed on her buttock  She didn't hit her head  She did not break any skin  She has not noticed any bruising  She has soreness on the right side of her back and her right upper arm  The following portions of the patient's history were reviewed and updated as appropriate:  past social history    Review of Systems   Neurological: Negative for dizziness, weakness and numbness  Current Outpatient Medications   Medication Sig Dispense Refill    acetaminophen (TYLENOL) 325 mg tablet Take 650 mg by mouth every 6 (six) hours as needed for mild pain      cyclobenzaprine (FLEXERIL) 5 mg tablet Take 1 tablet (5 mg total) by mouth daily at bedtime as needed for muscle spasms 30 tablet 3    hydrochlorothiazide (HYDRODIURIL) 12 5 mg tablet TAKE 1 TABLET BY MOUTH EVERY DAY 90 tablet 1    Multiple Vitamins-Minerals (HAIR SKIN AND NAILS FORMULA PO) Take 1 capsule by mouth daily after breakfast        No current facility-administered medications for this visit  Objective:    /84   Pulse 60   Temp (!) 97 °F (36 1 °C)   Resp 20   Ht 5' 5" (1 651 m)   Wt 94 3 kg (208 lb)   SpO2 99%   BMI 34 61 kg/m²      Physical Exam  Vitals signs and nursing note reviewed  Constitutional:       Appearance: She is well-developed  Cardiovascular:      Rate and Rhythm: Normal rate and regular rhythm  Heart sounds: Normal heart sounds  Pulmonary:      Effort: Pulmonary effort is normal  No respiratory distress  Breath sounds: Normal breath sounds  No wheezing  Musculoskeletal:         General: Tenderness (right low back) present     Skin:     Comments: No ecchymosis noted at area of injury             Marcela Martinez DO

## 2021-01-08 ENCOUNTER — TELEPHONE (OUTPATIENT)
Dept: FAMILY MEDICINE CLINIC | Facility: CLINIC | Age: 56
End: 2021-01-08

## 2021-01-08 NOTE — TELEPHONE ENCOUNTER
1/8/2021 12:19 PM returned call to [facility]  She is now having pain in the top of her right shoulder  The pain started yesterday  Heat recommended  Will follow up if not improved      Message complete  Tamika Berman DO

## 2021-01-08 NOTE — TELEPHONE ENCOUNTER
DR SAAVEDRA    Patient is experiencing shoulder and neck pain from her fall  She said she was to let you know if her symptoms changed

## 2021-01-13 ENCOUNTER — TELEPHONE (OUTPATIENT)
Dept: FAMILY MEDICINE CLINIC | Facility: CLINIC | Age: 56
End: 2021-01-13

## 2021-01-13 NOTE — TELEPHONE ENCOUNTER
She probably should be offered an in-person office appt for evaluation as suggested by Dr Keita Labor last interaction with her

## 2021-01-13 NOTE — TELEPHONE ENCOUNTER
Patient calling to let Dr Padma Bolden know that she is still have pain in her R Shoulder  They did have a phone conversation recently but patient wanted Dr Padma Bolden to know she is still in pain  Patient IS aware that Dr Padma Bolden is not back in the office until Monday      Please advise

## 2021-01-18 ENCOUNTER — OFFICE VISIT (OUTPATIENT)
Dept: FAMILY MEDICINE CLINIC | Facility: CLINIC | Age: 56
End: 2021-01-18
Payer: COMMERCIAL

## 2021-01-18 ENCOUNTER — APPOINTMENT (OUTPATIENT)
Dept: RADIOLOGY | Facility: CLINIC | Age: 56
End: 2021-01-18
Payer: COMMERCIAL

## 2021-01-18 VITALS
TEMPERATURE: 98 F | OXYGEN SATURATION: 99 % | RESPIRATION RATE: 16 BRPM | HEIGHT: 65 IN | HEART RATE: 63 BPM | DIASTOLIC BLOOD PRESSURE: 100 MMHG | BODY MASS INDEX: 34.61 KG/M2 | SYSTOLIC BLOOD PRESSURE: 150 MMHG

## 2021-01-18 DIAGNOSIS — M25.511 ACUTE PAIN OF RIGHT SHOULDER: Primary | ICD-10-CM

## 2021-01-18 DIAGNOSIS — M25.511 ACUTE PAIN OF RIGHT SHOULDER: ICD-10-CM

## 2021-01-18 PROCEDURE — 99213 OFFICE O/P EST LOW 20 MIN: CPT | Performed by: FAMILY MEDICINE

## 2021-01-18 PROCEDURE — 73030 X-RAY EXAM OF SHOULDER: CPT

## 2021-01-18 NOTE — PROGRESS NOTES
Assessment/Plan:    1  Acute pain of right shoulder  Comments:  new since fall  Orders:  -     XR shoulder 2+ vw right; Future; Expected date: 01/18/2021            There are no Patient Instructions on file for this visit  Return if symptoms worsen or fail to improve  Subjective:      Patient ID: Ligia Beverly is a 50 y o  female  Chief Complaint   Patient presents with    right shoulder pain     wmcma       She had severe right shoulder pain  She had to put something under her arm to sleep  The pain is worse at night  She gets sharp pain and down her upper arm and back of her shoulder  This pain started after her fall on 1/3/21  The following portions of the patient's history were reviewed and updated as appropriate: allergies, current medications, past family history, past medical history, past social history, past surgical history and problem list     Review of Systems      Current Outpatient Medications   Medication Sig Dispense Refill    acetaminophen (TYLENOL) 325 mg tablet Take 650 mg by mouth every 6 (six) hours as needed for mild pain      cyclobenzaprine (FLEXERIL) 5 mg tablet Take 1 tablet (5 mg total) by mouth daily at bedtime as needed for muscle spasms 30 tablet 3    hydrochlorothiazide (HYDRODIURIL) 12 5 mg tablet TAKE 1 TABLET BY MOUTH EVERY DAY 90 tablet 1    Multiple Vitamins-Minerals (HAIR SKIN AND NAILS FORMULA PO) Take 1 capsule by mouth daily after breakfast        No current facility-administered medications for this visit  Objective:    /100   Pulse 63   Temp 98 °F (36 7 °C)   Resp 16   Ht 5' 5" (1 651 m) Comment: patient reported  SpO2 99%   BMI 34 61 kg/m²        Physical Exam  Vitals signs and nursing note reviewed  Constitutional:       Appearance: She is well-developed  HENT:      Head: Normocephalic and atraumatic        Right Ear: Tympanic membrane and external ear normal       Left Ear: Tympanic membrane and external ear normal  Cardiovascular:      Rate and Rhythm: Normal rate and regular rhythm  Heart sounds: Normal heart sounds  No murmur  No friction rub  Pulmonary:      Effort: Pulmonary effort is normal  No respiratory distress  Breath sounds: Normal breath sounds  No wheezing or rales  Musculoskeletal: Normal range of motion  General: Tenderness (Right proximal upper arm posterior) present  Right lower leg: No edema  Left lower leg: No edema                  Maryjean Block, DO

## 2021-01-19 ENCOUNTER — TELEPHONE (OUTPATIENT)
Dept: FAMILY MEDICINE CLINIC | Facility: CLINIC | Age: 56
End: 2021-01-19

## 2021-01-19 NOTE — TELEPHONE ENCOUNTER
Pt states she is still having pain in the shoulder and upper back pain  Today she has tingling in her hands and is unable to sleep well  Pt wanted to let you know

## 2021-01-19 NOTE — TELEPHONE ENCOUNTER
Please thank her for letting me know and I will call once I get her x-ray results back    Thank you,  Tamika Berman, DO

## 2021-01-20 ENCOUNTER — OFFICE VISIT (OUTPATIENT)
Dept: OBGYN CLINIC | Facility: CLINIC | Age: 56
End: 2021-01-20
Payer: COMMERCIAL

## 2021-01-20 VITALS
WEIGHT: 208 LBS | DIASTOLIC BLOOD PRESSURE: 80 MMHG | HEART RATE: 80 BPM | SYSTOLIC BLOOD PRESSURE: 130 MMHG | HEIGHT: 65 IN | BODY MASS INDEX: 34.66 KG/M2

## 2021-01-20 DIAGNOSIS — M25.511 ACUTE PAIN OF RIGHT SHOULDER: ICD-10-CM

## 2021-01-20 DIAGNOSIS — M94.261 CHONDROMALACIA, RIGHT KNEE: Primary | ICD-10-CM
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
## 2021-01-20 PROCEDURE — 99213 OFFICE O/P EST LOW 20 MIN: CPT | Performed by: ORTHOPAEDIC SURGERY

## 2021-01-20 PROCEDURE — 1036F TOBACCO NON-USER: CPT | Performed by: ORTHOPAEDIC SURGERY

## 2021-01-20 PROCEDURE — 3008F BODY MASS INDEX DOCD: CPT | Performed by: ORTHOPAEDIC SURGERY

## 2021-01-20 NOTE — PROGRESS NOTES
Assessment/Plan:  1  Chondromalacia, right knee     2  Acute pain of right shoulder  Ambulatory referral to Orthopedic Surgery   3  Fall, initial encounter  Ambulatory referral to Orthopedic Surgery     Scribe Attestation    I,:  Atul Amaya am acting as a scribe while in the presence of the attending physician :       I,:  Jordan Geronimo, DO personally performed the services described in this documentation    as scribed in my presence :         Rabia Stinson is a very pleasant 72-year-old female who returns today for follow-up evaluation of her right knee pain  I am very pleased with the improvement she has experienced following initiation of formal therapy and NSAID medications  I would like her to continue with her efforts at therapy with discharge to home exercise program as appropriate  She will also continue using Tylenol and Aleve for pain relief as needed  We did discuss the importance of maintaining a home exercise program today in the office  With respect to her right shoulder, I did offer her a referral to my partner Dr Debbi Simmonds for formal evaluation  She is unsure of whether she will take advantage of this referral, but it will remain in the system for her should she choose to  We will see her back on an as-needed basis with respect to her right knee  Subjective: Follow-up evaluation for right knee pain     Patient ID: Al Monique is a 50 y o  female who returns today for follow-up evaluation of her right knee pain  At her last visit, she was instructed to begin anti-inflammatories for pain relief and was referred to formal therapy  She has been participating in formal physical therapy with a supplemental home exercise program   At today's visit, she reports 50% improvement in her symptoms  She continues to use Tylenol and Aleve for her mild pain as needed  She notes improved mobility and less pain about her right knee    She did suffer a recent fall and reports a new onset of right shoulder pain following fall  She denies any distal paresthesias of the lower extremity  She denies any mechanical symptoms about the knee  Review of Systems   Constitutional: Negative for chills, fever and unexpected weight change  HENT: Negative for hearing loss, nosebleeds and sore throat  Eyes: Negative for pain, redness and visual disturbance  Respiratory: Negative for cough, shortness of breath and wheezing  Cardiovascular: Negative for chest pain, palpitations and leg swelling  Gastrointestinal: Negative for abdominal pain, nausea and vomiting  Endocrine: Negative for polydipsia and polyuria  Genitourinary: Negative for dysuria and hematuria  Musculoskeletal: Positive for arthralgias  Negative for joint swelling and myalgias  See HPI   Skin: Negative for rash and wound  Neurological: Negative for dizziness, numbness and headaches  Psychiatric/Behavioral: Negative for decreased concentration and suicidal ideas  The patient is not nervous/anxious  Past Medical History:   Diagnosis Date    Cancer ([facility] [address] )     L breast-lumpectomy    Contact lens/glasses fitting     History of chemotherapy     History of depression     History of radiation therapy     Hypertension     Kidney stone     [facility]     Obesity     Premenopausal menorrhagia 3/28/2014       Past Surgical History:   Procedure Laterality Date    BREAST LUMPECTOMY Left 2006     SECTION      x2    COLONOSCOPY      ENDOMETRIAL ABLATION      FL RETROGRADE PYELOGRAM  2020    INTRAOPERATIVE RADIATION THERAPY ([facility])      last assessed: 7/17/15    ME COLONOSCOPY FLX DX W/COLLJ SPEC WHEN PFRMD N/A 3/22/2019    Procedure: COLONOSCOPY;  Surgeon: Rena Delatorre MD;  Location: [place] [address] [facility];   Service: Gastroenterology    ME CYSTO/URETERO W/LITHOTRIPSY &INDWELL STENT INSRT Left 2020    Procedure: CYSTOSCOPY URETEROSCOPY WITH LITHOTRIPSY HIGH POWERED LASER, RETROGRADE PYELOGRAM, BASKET STONE EXTRACTION, AND INSERTION STENT URETERAL;  Surgeon: Morgan Russ MD;  [facility];  Service: Urology       Family History   Problem Relation Age of Onset    Hypertension Family     Hypertension Mother     Heart disease Mother         CHF-related to smoking    ADD / ADHD Daughter     Diabetes Neg Hx     Stroke Neg Hx     Thyroid disease Neg Hx        Social History     Occupational History    Not on file   Tobacco Use    Smoking status: Never Smoker    Smokeless tobacco: Never Used   Substance and Sexual Activity    Alcohol use: No     Comment: socially    Drug use: No    Sexual activity: Not Currently         Current Outpatient Medications:     acetaminophen (TYLENOL) 325 mg tablet, Take 650 mg by mouth every 6 (six) hours as needed for mild pain, Disp: , Rfl:     cyclobenzaprine (FLEXERIL) 5 mg tablet, Take 1 tablet (5 mg total) by mouth daily at bedtime as needed for muscle spasms, Disp: 30 tablet, Rfl: 3    hydrochlorothiazide (HYDRODIURIL) 12 5 mg tablet, TAKE 1 TABLET BY MOUTH EVERY DAY, Disp: 90 tablet, Rfl: 1    Multiple Vitamins-Minerals (HAIR SKIN AND NAILS FORMULA PO), Take 1 capsule by mouth daily after breakfast , Disp: , Rfl:     Allergies   Allergen Reactions    Percocet [Oxycodone-Acetaminophen] Rash    Percolone [Oxycodone] Rash    Shellfish-Derived Products Rash       Objective:  Vitals:    01/20/21 0753   BP: 130/80   Pulse: 80       Body mass index is 34 61 kg/m²  Right Knee Exam     Tenderness   The patient is experiencing no tenderness (Medial and lateral patellar facet)       Range of Motion   Extension: 0   Flexion: 130     Tests   [place]:  Medial - negative Lateral - negative  Varus: negative Valgus: negative  Drawer:  Anterior - negative    Posterior - negative  Patellar apprehension: negative    Other   Erythema: absent  Scars: absent  Sensation: normal  Pulse: present  Swelling: none  Effusion: no effusion present    Comments:  Stable at 0, 30 90°  Neurovascularly intact distally  Mild parapatellar crepitance noted  Patellofemoral grind:  Positive            Observations     Right Knee   Negative for effusion  Physical Exam  Vitals signs and nursing note reviewed  Constitutional:       Appearance: She is well-developed  HENT:      Head: Normocephalic and atraumatic  Eyes:      General: No scleral icterus  Conjunctiva/sclera: Conjunctivae normal    Neck:      Musculoskeletal: Normal range of motion and neck supple  Cardiovascular:      Rate and Rhythm: Normal rate  Pulmonary:      Effort: Pulmonary effort is normal  No respiratory distress  Musculoskeletal:      Right knee: She exhibits no effusion  Comments: As noted in HPI   Skin:     General: Skin is warm and dry  Neurological:      Mental Status: She is alert and oriented to person, place, and time     Psychiatric:         Behavior: Behavior normal

## 2021-01-21 ENCOUNTER — TELEPHONE (OUTPATIENT)
Dept: FAMILY MEDICINE CLINIC | Facility: CLINIC | Age: 56
End: 2021-01-21

## 2021-01-21 DIAGNOSIS — M25.511 ACUTE PAIN OF RIGHT SHOULDER: Primary | ICD-10-CM

## 2021-01-21 NOTE — TELEPHONE ENCOUNTER
Pt states the alleive Pm is not helping and the pain is making her wake up in the night   Pls advise       [phone]- Alexi Woody

## 2021-01-21 NOTE — TELEPHONE ENCOUNTER
Patient calling for the results of her XRAY of shoulder she went for on Monday  I do see it just says "exam ended"   She stated that she is still in a lot of pain and experiencing pins and needles in hands    Please call patient with results and advise    [phone]

## 2021-01-22 RX ORDER — NABUMETONE 750 MG/1
750 TABLET, FILM COATED ORAL 2 TIMES DAILY PRN
Qty: 40 TABLET | Refills: 1 | Status: SHIPPED | OUTPATIENT
Start: 2021-01-22 | End: 2021-05-20 | Stop reason: ALTCHOICE

## 2021-01-22 NOTE — TELEPHONE ENCOUNTER
Monday had xray done at Urgent Care no results yet  Nayana Gifford has not slept at all from pain shoulder and would like to speak with dr Kerrie López

## 2021-01-22 NOTE — TELEPHONE ENCOUNTER
1/22/2021 3:29 PM result of x-ray still not available  Please call reading room to have it read    Thank you,  Tamika Berman, DO

## 2021-01-22 NOTE — TELEPHONE ENCOUNTER
Called reading room and spoke to Sukumar  Request to have exam read was made  Will keep open to track   Shahla Seymour MA

## 2021-01-22 NOTE — TELEPHONE ENCOUNTER
1/22/2021 4:48 PM Called Rashi Reece regarding her symptoms and x-ray results  She is not feeling well  She is having serious pain in her shoulder  She has tried heat with no relief  She is having stiffness and tingling in her finger  She is still having upper arm tenderness  Referred to orthopedist for further evaluation      Message jermaine Ken DO

## 2021-01-28 ENCOUNTER — TELEPHONE (OUTPATIENT)
Dept: FAMILY MEDICINE CLINIC | Facility: CLINIC | Age: 56
End: 2021-01-28

## 2021-01-28 NOTE — TELEPHONE ENCOUNTER
Patient calling and wants to know if she is allowed to take the medication nabumetone (RELAFEN) 750 mg tablet   In the afternoon  Or will this make her "sleepy"? Please call patient and advise her if she is able to take during the day      She is aware Dr Jj Harper is gone for the day and back tomorrow

## 2021-01-29 NOTE — TELEPHONE ENCOUNTER
Yes she can take the nabumetone during the day  It will not make her sleepy      Thank you,  Osbaldo Alexandra, DO

## 2021-02-04 ENCOUNTER — TELEPHONE (OUTPATIENT)
Dept: BARIATRICS | Facility: CLINIC | Age: 56
End: 2021-02-04

## 2021-02-05 ENCOUNTER — OFFICE VISIT (OUTPATIENT)
Dept: BARIATRICS | Facility: CLINIC | Age: 56
End: 2021-02-05
Payer: COMMERCIAL

## 2021-02-05 ENCOUNTER — TELEPHONE (OUTPATIENT)
Dept: FAMILY MEDICINE CLINIC | Facility: CLINIC | Age: 56
End: 2021-02-05

## 2021-02-05 VITALS
WEIGHT: 202 LBS | HEIGHT: 65 IN | DIASTOLIC BLOOD PRESSURE: 118 MMHG | BODY MASS INDEX: 33.66 KG/M2 | SYSTOLIC BLOOD PRESSURE: 170 MMHG | TEMPERATURE: 98.2 F | HEART RATE: 104 BPM

## 2021-02-05 DIAGNOSIS — E66.9 CLASS 1 OBESITY: Primary | ICD-10-CM

## 2021-02-05 DIAGNOSIS — I10 ESSENTIAL HYPERTENSION: ICD-10-CM

## 2021-02-05 DIAGNOSIS — N20.0 RENAL CALCULI: ICD-10-CM

## 2021-02-05 PROCEDURE — 99214 OFFICE O/P EST MOD 30 MIN: CPT | Performed by: INTERNAL MEDICINE

## 2021-02-05 NOTE — PROGRESS NOTES
Assessment/Plan:  Hafsa Lowery was seen today for follow-up  Diagnoses and all orders for this visit:      Renal calculi  Hx of nephrolithiasis last year   Will avoid topiramate    Essential hypertension  Elevated today, states she is very stressed  Previous readings between 130-150s  On HCTZ as of now  Weight loss and increasing activity level should help  Limit sodium intake <2500mg per day    Class 1 obesity  -     Magnesium; Future  - Discussed options of HealthyCORE-Intensive Lifestyle Intervention Program, Very Low Calorie Diet-VLCD and Conservative Program and the role of weight loss medications  - Explained the importance of making lifestyle changes first before starting any anti-obesity medications  Patient should demonstrate lifestyle changes first before anti-obesity medication can be initiated  - Patient is interested in pursuing Very Low Calorie Diet-VLCD  - Initial weight loss goal of 5-10% weight loss for improved health    Goals:  Do not skip any meals! Food log (ie ) www myfitnesspal com,sparkpeople  com,loseit com,calorieking  com,etc  baritastic (use smartphone per[employer] for recipes)  No sugary beverages  At least 64oz of water daily  Increase physical activity by 10 minutes daily  Gradually increase physical activity to a goal of 5 days per week for 30 minutes of MODERATE intensity PLUS 2 days per week of FULL BODY resistance training (use smartphone apps FitON, Home Workout, etc )      45 minute visit, >50% face-to-face time spent counseling patient on surgical and nonsurgical interventions for the treatment of excess weight  Discussed the advantages and long-term outcomes with regards to bariatric surgery  Discussed in detail nonsurgical options including intensive lifestyle intervention program, very low-calorie diet program and conservative program   Discussed the role of weight loss medications  Counseled patient on diet behavior and exercise modification for weight loss  Follow up in approximately 2 weeks with Non-Surgical Dietician  Subjective:   Chief Complaint   Patient presents with    Follow-up     mwm f/u       Patient ID: Oniel Bhatti  is a 50 y o  female with excess weight/obesity here to pursue weight management  Past Medical History:   Diagnosis Date    [facility] [facility]     L breast-lumpectomy    Contact lens/glasses fitting     History of chemotherapy     History of depression     History of radiation therapy     Hypertension     Kidney stone     Malignant neoplasm of [facility] )     Obesity     Premenopausal menorrhagia 3/28/2014     Past Surgical History:   Procedure Laterality Date    BREAST LUMPECTOMY Left 2006     SECTION      x2    COLONOSCOPY      ENDOMETRIAL ABLATION      FL RETROGRADE PYELOGRAM  2020    INTRAOPERATIVE RADIATION THERAPY (IORT)      last assessed: 7/17/15    ND COLONOSCOPY FLX DX W/COLLJ SPEC WHEN PFRMD N/A 3/22/2019    Procedure: COLONOSCOPY;  Surgeon: Gale Mercado MD;  Location: [facility] GI LAB;   Service: Gastroenterology    ND CYSTO/URETERO W/LITHOTRIPSY &INDWELL STENT INSRT Left 2020    Procedure: CYSTOSCOPY URETEROSCOPY WITH LITHOTRIPSY HIGH POWERED LASER, RETROGRADE PYELOGRAM, BASKET STONE EXTRACTION, AND INSERTION STENT URETERAL;  Surgeon: Veronica Salazar MD;  [facility]: [facility];  Service: Urology       HPI:  Wt Readings from Last 20 Encounters:   21 91 6 kg (202 lb)   21 94 3 kg (208 lb)   21 94 3 kg (208 lb)   20 92 6 kg (204 lb 3 2 oz)   20 92 5 kg (204 lb)   20 92 2 kg (203 lb 3 2 oz)   20 93 4 kg (206 lb)   08/10/20 93 4 kg (206 lb)   20 95 3 kg (210 lb)   20 95 3 kg (210 lb)   20 95 3 kg (210 lb)   20 95 3 kg (210 lb)   04/15/20 92 5 kg (204 lb)   20 92 5 kg (204 lb)   19 92 9 kg (204 lb 12 8 oz)   10/28/19 92 3 kg (203 lb 6 4 oz)   19 95 7 kg (211 lb)   19 95 3 kg (210 lb)   19 95 3 kg (210 lb)   02/22/19 95 7 kg (211 lb)     Severity: Mild  Onset:  Past 5 years  Emotional eater, went through a lot this past year with family  Modifiers: Diet and Exercise  Contributing factors: Poor Food Choices and Insufficient Physical Activity  Associated symptoms: comorbid conditions  Goal weight: 130 lbs  Goes for sweets when stressed  Was seen back in 2019 for initial visit 204 lbs    B- oatmeal  S-  L- jerk chicken, rice, beans  S-  D- jerk chicken, rice, beans  S- cookies    Hydration: 1 gallon water with lemon, tea  Alcohol: rare  Smoking: no  Exercise: workout videos 3-4 x wk or walking  Sleep: 5 hours (due to stress)       The following portions of the patient's history were reviewed and updated as appropriate: allergies, current medications, past family history, past medical history, past social history, past surgical history, and problem list     Review of Systems   Constitutional: Negative for appetite change  HENT: Negative for rhinorrhea  Respiratory: Negative for shortness of breath  Cardiovascular: Negative for chest pain  Gastrointestinal: Negative for abdominal pain, constipation, diarrhea, nausea and vomiting  Endocrine: Negative for cold intolerance and heat intolerance  Genitourinary: Negative for difficulty urinating  Musculoskeletal: Negative for arthralgias  Skin: Negative for color change  Neurological: Negative for dizziness and numbness  Psychiatric/Behavioral: Negative for sleep disturbance  The patient is not nervous/anxious  All other systems reviewed and are negative  Objective:  BP (!) 170/118   Pulse 104   Temp 98 2 °F (36 8 °C)   Ht 5' 5" (1 651 m)   Wt 91 6 kg (202 lb)   BMI 33 61 kg/m²   Constitutional: Well-developed, well-nourished and obese Body mass index is 33 61 kg/m²  Erasmo Garcia HEENT: No conjunctival pallor or jaundice  Pulmonary: No increased work of breathing or signs of respiratory distress  Clear to auscultation    CV: Normal rate and rhythm, S1 and S2, without murmurs  GI: Obese  Normal bowel sounds  Soft and nontender  MSK: No edema   Neuro: Oriented to person, place and time  Normal Speech  Normal gait  Psych: Normal affect and mood       Labs and Imaging  Lab Results   Component Value Date    WBC 6 0 12/23/2020    HGB 12 3 12/23/2020    HCT 36 6 12/23/2020    MCV 85 12/23/2020     12/23/2020     Lab Results   Component Value Date     11/12/2016    SODIUM 141 12/23/2020    K 4 2 12/23/2020     12/23/2020    CO2 26 12/23/2020    BUN 19 12/23/2020    CREATININE 0 93 12/23/2020    GLUC 90 12/23/2020    CALCIUM 9 2 11/12/2016    AST 27 12/23/2020    ALT 17 12/23/2020    ALKPHOS 72 11/12/2016    PROT 6 8 11/12/2016    TP 7 4 12/23/2020    BILITOT 0 7 11/12/2016    TBILI 0 4 12/23/2020     No results found for: HGBA1C  Lab Results   Component Value Date    TSH 1 420 12/23/2020     Lab Results   Component Value Date    CHOLESTEROL 196 01/21/2019     Lab Results   Component Value Date    HDL 73 01/21/2019     Lab Results   Component Value Date    TRIG 73 01/21/2019     No results found for: LDLDIRECT        Colonoscopy-Completed

## 2021-02-05 NOTE — TELEPHONE ENCOUNTER
2/5/2021 12:18 PM Returned call to patient  She is having a lot of stress  She had a bad review at work on 1/19/21  She just had another meeting with her team and it is really affecting  Her shoulder continues to bother her  She will see Dr Charli Rosen on the 8th      [facility], DO

## 2021-02-05 NOTE — TELEPHONE ENCOUNTER
Dr Meenakshi Armendariz   I tried to call her and get more information  She states she only wants to speak to you, and would not discuss anything further  She only said it has to do with stress and she spoke to you about it in the summer    [facility], LPN

## 2021-02-05 NOTE — TELEPHONE ENCOUNTER
Daniel Osorio is calling to speak with Dr Judith Wilson    She did not tell me what this was in regards to   [phone]

## 2021-02-08 ENCOUNTER — OFFICE VISIT (OUTPATIENT)
Dept: OBGYN CLINIC | Facility: CLINIC | Age: 56
End: 2021-02-08
Payer: COMMERCIAL

## 2021-02-08 VITALS
WEIGHT: 202 LBS | HEART RATE: 72 BPM | DIASTOLIC BLOOD PRESSURE: 70 MMHG | BODY MASS INDEX: 33.66 KG/M2 | SYSTOLIC BLOOD PRESSURE: 130 MMHG | HEIGHT: 65 IN

## 2021-02-08 DIAGNOSIS — S46.911A RIGHT SHOULDER STRAIN, INITIAL ENCOUNTER: Primary | ICD-10-CM
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
## 2021-02-08 DIAGNOSIS — M25.511 ACUTE PAIN OF RIGHT SHOULDER: ICD-10-CM

## 2021-02-08 PROCEDURE — 99214 OFFICE O/P EST MOD 30 MIN: CPT | Performed by: ORTHOPAEDIC SURGERY

## 2021-02-08 NOTE — PROGRESS NOTES
Assessment/Plan:  1  Right shoulder strain, initial encounter  Ambulatory referral to Physical Therapy   2  Acute pain of right shoulder  Ambulatory referral to Orthopedic Surgery   3  Fall, initial encounter  Ambulatory referral to Orthopedic Surgery    Ambulatory referral to Physical Therapy       Scribe Attestation    I,:  Sam Ovalles am acting as a scribe while in the presence of the attending physician :       I,:  Radu Barr MD personally performed the services described in this documentation    as scribed in my presence :             Upon review of x-rays, clinical examination and a thorough history  Yvon Drake is presenting with signs and symptoms consistent with a right shoulder strain  I do not feel there is cervical involvement  I explained to Yvon Drake that is very possible she has suffered a traumatic rotator cuff tear  She is weak and becoming stiff about the shoulder  I am also concerned about the development of adhesive capsulitis and feel it is important to maintain and increase the range of motion about the shoulder  I provided her a prescription for physical therapy that she will attend for the next 4 weeks  At the 4 week sana she will return to see me  If she has not shown improvement we will then consider an MRI of the right shoulder  I explained to Yvon Drake that should a rotator cuff tear be found she may require surgery to repair any tears  She can continue on the Relafen  I did offer a prednisone prescription of which she deferred  I encouraged her to apply a heating pad in the morning for 5-10 minutes and to ice in the evening and following physical therapy for no more than 20 minutes at a time  She will follow-up with me in 4 weeks  Subjective:   Rohith Loera is a 50 y o  female who presents to the office today for evaluation of her right shoulder  She has been referred to us by Dr Demarcus Willard    Yvon Drake suffered from a fall on January 3rd when she slipped on snow at a 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  She states she believes she attempted to brace her fall with the right arm  She developed immediate right shoulder pain  She denies head or neck injury  She denies loss of consciousness  Yuli's chief complaint is of the intermittent sharp and severe pain located on the posterior lateral aspect of the right arm in the intermittent numbness and tingling that radiates from the right shoulder into the right hand  Her pain is exacerbated by range of motion of the right shoulder  She is having difficulty sleeping  Dr Kae Begum prescribed her Relafen which provided some relief  She will also take a Tylenol p m  To help her sleep  She had previous x-rays which were negative for fracture dislocation  Review of Systems   Constitutional: Negative for chills, fever and unexpected weight change  HENT: Negative for hearing loss, nosebleeds and sore throat  Eyes: Negative for pain, redness and visual disturbance  Respiratory: Negative for cough, shortness of breath and wheezing  Cardiovascular: Negative for chest pain, palpitations and leg swelling  Gastrointestinal: Negative for abdominal pain, nausea and vomiting  Endocrine: Negative for polydipsia and polyuria  Genitourinary: Negative for dysuria and hematuria  Musculoskeletal:        See HPI   Skin: Negative for rash and wound  Neurological: Negative for dizziness, numbness and headaches  Psychiatric/Behavioral: Negative for decreased concentration and suicidal ideas  The patient is not nervous/anxious            Past Medical History:   Diagnosis Date    [facility] ([facility] )     L breast-lumpectomy    Contact lens/glasses fitting     History of chemotherapy     History of depression     History of radiation therapy     Hypertension     Kidney stone     [facility]    Obesity     Premenopausal menorrhagia 3/28/2014       Past Surgical History:   Procedure Laterality Date    BREAST LUMPECTOMY Left 2006   SECTION      x2    COLONOSCOPY      ENDOMETRIAL ABLATION      FL RETROGRADE PYELOGRAM  2020    INTRAOPERATIVE RADIATION THERAPY (IORT)      last assessed: 7/17/15    [place] COLONOSCOPY FLX DX W/COLLJ SPEC WHEN PFRMD N/A 3/22/2019    Procedure: COLONOSCOPY;  Surgeon: Aurora Coronado MD;  Location: [place] [address] GI LAB;   Service: Gastroenterology    [place] CYSTO/URETERO W/LITHOTRIPSY &INDWELL STENT INSRT Left 2020    Procedure: CYSTOSCOPY URETEROSCOPY WITH LITHOTRIPSY HIGH POWERED LASER, RETROGRADE PYELOGRAM, BASKET STONE EXTRACTION, AND INSERTION STENT URETERAL;  Surgeon: Hernán Bauer MD;  [facility];  Service: Urology       Family History   Problem Relation Age of Onset    Hypertension Family     Hypertension Mother     Heart disease Mother         CHF-related to smoking    ADD / ADHD Daughter     Diabetes Neg Hx     Stroke Neg Hx     Thyroid disease Neg Hx        Social History     Occupational History    Not on file   Tobacco Use    Smoking status: Never Smoker    Smokeless tobacco: Never Used   Substance and Sexual Activity    Alcohol use: No     Comment: socially    Drug use: No    Sexual activity: Not Currently         Current Outpatient Medications:     acetaminophen (TYLENOL) 325 mg tablet, Take 650 mg by mouth every 6 (six) hours as needed for mild pain, Disp: , Rfl:     cyclobenzaprine (FLEXERIL) 5 mg tablet, Take 1 tablet (5 mg total) by mouth daily at bedtime as needed for muscle spasms, Disp: 30 tablet, Rfl: 3    hydrochlorothiazide (HYDRODIURIL) 12 5 mg tablet, TAKE 1 TABLET BY MOUTH EVERY DAY, Disp: 90 tablet, Rfl: 1    Multiple Vitamins-Minerals (HAIR SKIN AND NAILS FORMULA PO), Take 1 capsule by mouth daily after breakfast , Disp: , Rfl:     nabumetone (RELAFEN) 750 mg tablet, Take 1 tablet (750 mg total) by mouth 2 (two) times a day as needed for moderate pain (with food), Disp: 40 tablet, Rfl: 1    Allergies   Allergen Reactions    Percocet [Oxycodone-Acetaminophen] Rash    Percolone [Oxycodone] Rash    Shellfish-Derived Products Rash       Objective:  Vitals:    02/08/21 0834   BP: 130/70   Pulse: 72       Right Shoulder Exam     Range of Motion   Active abduction: 110   External rotation: 50   Forward flexion: 90   Internal rotation 90 degrees: 60     Muscle Strength   Right shoulder normal muscle strength: External rotation is 4-  Abduction: 4/5   Internal rotation: 4/5   External rotation: 4/5   Supraspinatus: 4/5   Subscapularis: 4/5     Tests   Impingement: positive    Other   Sensation: normal  Pulse: present (2+ radial)            Physical Exam  Vitals signs reviewed  Constitutional:       Appearance: She is well-developed  HENT:      Head: Normocephalic and atraumatic  Eyes:      General:         Right eye: No discharge  Left eye: No discharge  Conjunctiva/sclera: Conjunctivae normal    Neck:      Musculoskeletal: Normal range of motion and neck supple  Cardiovascular:      Rate and Rhythm: Regular rhythm  Pulmonary:      Effort: Pulmonary effort is normal  No respiratory distress  Breath sounds: No stridor  Skin:     General: Skin is warm and dry  Neurological:      Mental Status: She is alert and oriented to person, place, and time  Psychiatric:         Behavior: Behavior normal          I have personally reviewed pertinent films in PACS and my interpretation is as follows:  X-rays of the right shoulder are normal   There is no evidence of acute fracture or other osseous abnormality

## 2021-02-08 NOTE — LETTER
February 8, 2021     Jaswinder [place], [address] [id number]    Patient: Sugar Keith   YOB: 1965   Date of Visit: 2/8/2021       Dear Dr Neff Jo Ann: Thank you for referring Sugar Keith to me for evaluation  Below are the relevant portions of my assessment and plan of care  Upon review of x-rays, clinical examination and a thorough history  Rashi Reece is presenting with signs and symptoms consistent with a right shoulder strain  I do not feel there is cervical involvement  I explained to Rashi Reece that is very possible she has suffered a traumatic rotator cuff tear  She is weak and becoming stiff about the shoulder  I am also concerned about the development of adhesive capsulitis and feel it is important to maintain and increase the range of motion about the shoulder  I provided her a prescription for physical therapy that she will attend for the next 4 weeks  At the 4 week sana she will return to see me  If she has not shown improvement we will then consider an MRI of the right shoulder  I explained to Rashi Reece that should a rotator cuff tear be found she may require surgery to repair any tears  She can continue on the Relafen  I did offer a prednisone prescription of which she deferred  I encouraged her to apply a heating pad in the morning for 5-10 minutes and to ice in the evening and following physical therapy for no more than 20 minutes at a time  She will follow-up with me in 4 weeks  If you have questions, please do not hesitate to call me  I look forward to following Rashi Reece along with you           Sincerely,        Piper Bradford MD        CC: No Recipients

## 2021-02-08 NOTE — LETTER
February 8, 2021     Gaston Low DO  [address]     Patient: Saurabh Reyes   YOB: 1965   Date of Visit: 2/8/2021       Dear Dr Sofya Villanueva: Thank you for referring Saurabh Reyes to me for evaluation  Below are the relevant portions of my assessment and plan of care  Upon review of x-rays, clinical examination and a thorough history  Virgil Bonds is presenting with signs and symptoms consistent with a right shoulder strain  I do not feel there is cervical involvement  I explained to Virgil Bonds that is very possible she has suffered a traumatic rotator cuff tear  She is weak and becoming stiff about the shoulder  I am also concerned about the development of adhesive capsulitis and feel it is important to maintain and increase the range of motion about the shoulder  I provided her a prescription for physical therapy that she will attend for the next 4 weeks  At the 4 week sana she will return to see me  If she has not shown improvement we will then consider an MRI of the right shoulder  I explained to Virgil Bonds that should a rotator cuff tear be found she may require surgery to repair any tears  She can continue on the Relafen  I did offer a prednisone prescription of which she deferred  I encouraged her to apply a heating pad in the morning for 5-10 minutes and to ice in the evening and following physical therapy for no more than 20 minutes at a time  She will follow-up with me in 4 weeks  If you have questions, please do not hesitate to call me  I look forward to following Virgil Bonds along with you           Sincerely,        Marck Wagoner MD        CC: No Recipients

## 2021-02-15 ENCOUNTER — OFFICE VISIT (OUTPATIENT)
Dept: OTOLARYNGOLOGY | Facility: CLINIC | Age: 56
End: 2021-02-15
Payer: COMMERCIAL

## 2021-02-15 VITALS — HEIGHT: 65 IN | TEMPERATURE: 97.9 F | WEIGHT: 202 LBS | BODY MASS INDEX: 33.66 KG/M2

## 2021-02-15 DIAGNOSIS — S01.312S EAR LOBE LACERATION, LEFT, SEQUELA: Primary | ICD-10-CM

## 2021-02-15 DIAGNOSIS — Z41.3: ICD-10-CM

## 2021-02-15 PROCEDURE — 99214 OFFICE O/P EST MOD 30 MIN: CPT | Performed by: SPECIALIST

## 2021-02-15 NOTE — PROGRESS NOTES
Assessment/Plan:    Encounter for left ear piercing  Left ear lobe piercing  Well healed repair of left ear lobe laceration  Applied Lidocaine cream (Emla) to left ear lobe, allowed over 30 minutes for anesthetic effect  Site marked by patient on left ear lobe   Piercing completed with piercing device without difficulty   No bleeding after procedure  Pt tolerated procedure well  Reviewed post ear piercing care  Follow up prn        Diagnoses and all orders for this visit:    Ear lobe laceration, left, sequela    Encounter for left ear piercing          Subjective:      Patient ID: Ly Winkler is a 50 y o  female  Presents today as a follow up for post procedure due to ear lobe laceration  Laceration left ear lobe initially on 04/15/2020, revisions repair 05/18/2020  Sought care in urgent care at initial start  Feels site well healing since repair  Presents for ear piercing left lobe today  She left out earring for a few hours and then was unable to replace  The following portions of the patient's history were reviewed and updated as appropriate: allergies, current medications, past family history, past medical history, past social history, past surgical history and problem list     Review of Systems   Constitutional: Negative  HENT: Positive for ear pain  Negative for congestion, ear discharge, hearing loss, nosebleeds, postnasal drip, rhinorrhea, sinus pressure, sinus pain, sore throat, tinnitus and voice change  Eyes: Negative  Respiratory: Negative for chest tightness and shortness of breath  Cardiovascular: Negative  Gastrointestinal: Negative  Endocrine: Negative  Musculoskeletal: Negative  Skin: Negative for color change  Neurological: Negative for dizziness, numbness and headaches  Psychiatric/Behavioral: Negative            Objective:      Temp 97 9 °F (36 6 °C) (Temporal)   Ht 5' 5" (1 651 m)   Wt 91 6 kg (202 lb)   BMI 33 61 kg/m²          Physical Exam  Constitutional:       Appearance: She is well-developed  HENT:      Head: Normocephalic  Right Ear: Hearing, tympanic membrane, ear canal and external ear normal  No decreased hearing noted  No drainage or tenderness  Tympanic membrane is not perforated or erythematous  Left Ear: Hearing, tympanic membrane, ear canal and external ear normal  No decreased hearing noted  No drainage or tenderness  Tympanic membrane is not perforated or erythematous  Nose: Nose normal  No nasal deformity or septal deviation  Mouth/Throat:      Mouth: Mucous membranes are not pale and not dry  No oral lesions  Dentition: Normal dentition  Pharynx: Uvula midline  No oropharyngeal exudate  Neck:      Musculoskeletal: Full passive range of motion without pain, normal range of motion and neck supple  Trachea: No tracheal deviation  Cardiovascular:      Rate and Rhythm: Normal rate  Pulmonary:      Effort: Pulmonary effort is normal  No accessory muscle usage or respiratory distress  Musculoskeletal:      Right shoulder: She exhibits normal range of motion  Lymphadenopathy:      Cervical: No cervical adenopathy  Skin:     General: Skin is warm and dry  Neurological:      Mental Status: She is alert and oriented to person, place, and time  Cranial Nerves: No cranial nerve deficit  Sensory: No sensory deficit  Psychiatric:         Behavior: Behavior is cooperative           Scribe Attestation    I,:  ONNA Dominguez am acting as a scribe while in the presence of the attending physician :       I,:  Dontae Hernandez MD personally performed the services described in this documentation    as scribed in my presence :

## 2021-02-15 NOTE — ASSESSMENT & PLAN NOTE
Left ear lobe piercing  Well healed repair of left ear lobe laceration  Applied Lidocaine cream (Emla) to left ear lobe, allowed over 30 minutes for anesthetic effect  Site marked by patient on left ear lobe   Piercing completed with piercing device without difficulty   No bleeding after procedure  Pt tolerated procedure well  Reviewed post ear piercing care     Follow up prn

## 2021-02-17 ENCOUNTER — TELEPHONE (OUTPATIENT)
Dept: BARIATRICS | Facility: CLINIC | Age: 56
End: 2021-02-17

## 2021-02-24 ENCOUNTER — EVALUATION (OUTPATIENT)
Dept: PHYSICAL THERAPY | Facility: CLINIC | Age: 56
End: 2021-02-24
Payer: COMMERCIAL
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
## 2021-02-24 DIAGNOSIS — S46.911D STRAIN OF RIGHT SHOULDER, SUBSEQUENT ENCOUNTER: Primary | ICD-10-CM

## 2021-02-24 PROCEDURE — 97162 PT EVAL MOD COMPLEX 30 MIN: CPT | Performed by: PHYSICAL THERAPIST

## 2021-02-24 NOTE — PROGRESS NOTES
PT Evaluation     Today's date: 2021  Patient name: Rohith Loera  : 1965  MRN: [id number]  Referring provider: Evaristo Boswell MD  Dx:   Encounter Diagnosis     ICD-10-CM    1  Strain of right shoulder, subsequent encounter  0660 [id number] Ambulatory referral to Physical Therapy   2  Cause of injury, accidental fall, subsequent encounter  W19  XXXD Ambulatory referral to Physical Therapy       Start Time: 886  Stop Time:   Total time in clinic (min): 60 minutes    Assessment  Assessment details: Rohith Loera is a 50 y o  female who presents with: Strain of right shoulder, subsequent encounter  (primary encounter diagnosis)  Cause of injury, accidental fall, subsequent encounter  Pt presents with pain, decreased UE range of motion, decreased UE strength, decreased cervical range of motion , impaired function, decreased activity tolerance, fair posture and poor body mechanics  Pt presents with these impairments and would benefit from skilled physical therapy to address these impairments in order to maximize their function  Pt presenting with signs and symptoms consistent with R shoulder strain  Repeated cervical movements did not change symptoms in R shoulder/UE and injury does not appear to be related to cervical region   Pt s/p slip and fall on 1/3/21  Impairments: abnormal muscle firing, abnormal or restricted ROM, abnormal movement, activity intolerance, impaired physical strength, lacks appropriate home exercise program, pain with function, scapular dyskinesis, poor posture  and poor body mechanics  Functional limitations: decreased ability to perform upper extremity ADLs, decreased overhead ADLs, decreased ability to sleep, decreased ability to perform job dutiesUnderstanding of Dx/Px/POC: good   Prognosis: good    Goals  Short term goals: (to be met in 4 weeks)  + patient will report a 50% improvement in function   + Patient will be independent in basic HEP   +Patient able to sleep x 4 hours without being awoken by pain R shoulder  +Patient able to perform UE ADLS with pain less than 3/10 R shoulder    Long term goals: (LTGs to be met in 8 weeks)  + Patient will report 85 % improvement improvement in symptoms with ADL's  + Patient will have full range of motion right shoulder in order to perform all overhead ADLs   + Patient will be able to sleep x 6 hours without being awoken by pain R shoulder  + Patient will increase FOTO score by 10 points  + Patient will demonstrate appropriate scapulohumeral rhythm with reaching overhead   + patient will demonstrate functional reaching without compensatory patterns    + Patient will be independent in a comprehensive home exercise program       Plan  Patient would benefit from: skilled physical therapy  Planned modality interventions: cryotherapy and thermotherapy: hydrocollator packs  Planned therapy interventions: abdominal trunk stabilization, activity modification, ADL retraining, ADL training, body mechanics training, breathing training, functional ROM exercises, flexibility, graded activity, graded exercise, home exercise program, transfer training, therapeutic training, therapeutic exercise, therapeutic activities, stretching, strengthening, postural training, patient education, neuromuscular re-education and manual therapy  Frequency: 2x week  Duration in weeks: 8  Treatment plan discussed with: patient        Subjective Evaluation    History of Present Illness  Date of onset: 1/3/2021  Mechanism of injury: Pt states she fell on 1/3/21 outside in parking lot of the [employer] and an incident report was written at store  2 days later pt states significant pain R shoulder and went to see her primary MD, Dr Hailey Jeffries  Pt had Slip and fall on 1/3/21 and Pt states she had severe pain R shoulder 2 days post fall  Pain not improving per pt report  Pt had an xray and (-) fracture R shoulder/UE  Pt then went to see Dr Noa Ibarra and he ordered PT on 2/8/21    If no improvement with PT, MD to consider MRI of R shoulder          Not a recurrent problem   Quality of life: good    Pain  Current pain ratin  At best pain ratin  At worst pain rating: 10  Location: R shoulder pain and into R hand  Quality: tight, throbbing, squeezing, radiating, burning, needle-like and pressure  Relieving factors: medications, change in position, relaxation and support  Aggravating factors: standing, overhead activity and lifting  Progression: worsening    Social Support  Lives in: condominium  Lives with: spouse    Employment status: working ()  Hand dominance: right  Exercise history: exercise daily and "try to keep in shape"      Diagnostic Tests  X-ray: normal (-) fracture  Treatments  Previous treatment: medication  Current treatment: medication  Patient Goals  Patient goals for therapy: decreased pain, decreased edema, increased motion, independence with ADLs/IADLs and increased strength  Patient goal: "get better 100%"        Objective     Postural Observations  Seated posture: fair  Standing posture: fair        Palpation   Left   Tenderness of the anterior deltoid  Trigger point to anterior deltoid  Right   Tenderness of the anterior deltoid, biceps, levator scapulae, middle deltoid, supraspinatus and upper trapezius  Trigger point to anterior deltoid       Cervical/Thoracic Screen   Cervical range of motion within normal limits with the following exceptions: Cervical ROM decreased by 25% B     Neurological Testing     Sensation     Shoulder   Left Shoulder   Intact: light touch    Right Shoulder   Intact: light touch    Additional Neurological Details  Pt c/o intermittent numbness/tingling R hand    Active Range of Motion   Left Shoulder   Normal active range of motion    Right Shoulder   Flexion: 170 degrees with pain  Abduction: 170 degrees with pain  External rotation 90°: with pain  Internal rotation 45°: with pain    Additional Active Range of Motion Details  PROM R shoulder WFL  Scapular Mobility   Left Shoulder   Scapular mobility: good    Right Shoulder   Scapular mobility: good    Strength/Myotome Testing     Left Shoulder     Planes of Motion   Flexion: 4   Extension: 4   Abduction: 4   Adduction: 4     Right Shoulder     Planes of Motion   Flexion: 4-   Extension: 4-   Abduction: 4-   Adduction: 4-     Tests     Right Shoulder   Positive Hawkin's  Negative drop arm and empty can  Additional Tests Details  Repeated cervical movements had no effect on R shoulder/UE pain        Flowsheet Rows      Most Recent Value   PT/OT G-Codes   Current Score  57   Projected Score  68             Precautions: HTN, breast CA in 2006 with chemo and radiation treatment, depression, s/p slip and fall on 1/3/21      Manuals 2/24            PROM R shoulder             STM R UE/scapular region                                       Neuro Re-Ed             scap retraction x10 hold 5            Body blade                                                                              Ther Ex             AROM shoulder flex/abd x10 ea hold 5            Cervical SB stretch x10 hold 5            Cervical rot stretch x10 hold 5            Postural education performed            TB ext NV            Pulley flex/abd NV                                                                                                                    Modalities             MHP

## 2021-02-25 ENCOUNTER — TELEPHONE (OUTPATIENT)
Dept: FAMILY MEDICINE CLINIC | Facility: CLINIC | Age: 56
End: 2021-02-25

## 2021-02-25 NOTE — TELEPHONE ENCOUNTER
2/25/2021 2:46 PM returned call to [facility]  She just wanted to let me know that was terminated from her employer today  She has a lot of emotions about it  They told her it was job performance  She is both upset and angry about it  She just wanted me to know what was going on with her      Message jermaine Jones, DO

## 2021-02-25 NOTE — TELEPHONE ENCOUNTER
Patient left message on machine stating she needs to talk to Dr Pamela Ariza regarding a personal matter, not urgent, would like a call back

## 2021-03-01 DIAGNOSIS — E66.9 CLASS 1 OBESITY: Primary | ICD-10-CM

## 2021-03-02 ENCOUNTER — OFFICE VISIT (OUTPATIENT)
Dept: PHYSICAL THERAPY | Facility: CLINIC | Age: 56
End: 2021-03-02
Payer: COMMERCIAL

## 2021-03-02 ENCOUNTER — PATIENT OUTREACH (OUTPATIENT)
Dept: BARIATRICS | Facility: CLINIC | Age: 56
End: 2021-03-02

## 2021-03-02 DIAGNOSIS — S46.911D STRAIN OF RIGHT SHOULDER, SUBSEQUENT ENCOUNTER: ICD-10-CM
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
## 2021-03-02 PROCEDURE — 97110 THERAPEUTIC EXERCISES: CPT

## 2021-03-02 PROCEDURE — 97112 NEUROMUSCULAR REEDUCATION: CPT

## 2021-03-02 NOTE — PROGRESS NOTES
Daily Note     Today's date: 3/2/2021  Patient name: Saurabh Reyes  : 1965  MRN: [id number]  Referring provider: Jaky Rouse MD  Dx:   Encounter Diagnosis     ICD-10-CM    1  Cause of injury, accidental fall, subsequent encounter  W19  XXXD    2  Strain of right shoulder, subsequent encounter  S46 911D                   Subjective: Pt reports no new complaints  Shoulder is mildly sore but feels good overall  Felt okay after first session  Pt arrives 15 min late  Objective: Requires cueing for form to promote proper scap retraction and bracing w/ shoulder rows and ext, corrects but fatigues quickly  Assessment: Tolerated treatment well  Patient demonstrated fatigue post treatment and would benefit from continued PT  Pt reports fatigue but no increase in pain by end of session  Does well w/ higher level strength progressions and will benefit from progressive increases in challenge as sx allow  Will continue on 1x/week basis, will plan to add a second visit if necessary in future  Plan: Continue per plan of care   wall slide w/ loop band, carries, core work, scaption and overhead work or shelf reach      Precautions: HTN, breast CA in  with chemo and radiation treatment, depression, s/p slip and fall on 1/3/21      Manuals  (1) IE  3/2 (2)      PROM R shoulder        STM R UE/scapular region                        Neuro Re-Ed        scap retraction x10 hold 5 Yellow tubing shoulder rows and ext 2x10-15 each      Body blade          Yellow tubing IR/ER 2x10-15 each        Supine horiz abd w/ scap sq x 20 total        90 deg supine alt isos 3x30 sec rounds                      Ther Ex        AROM shoulder flex/abd x10 ea hold 5       Cervical SB stretch x10 hold 5       Cervical rot stretch x10 hold 5       Postural education performed x2 mins      TB ext NV       Pulley flex/abd NV         PROM R shoulder x6-8 min all directions w/ end range holds         Review of self mobility work x 2-3 mins                                                       Modalities        P

## 2021-03-02 NOTE — PROGRESS NOTES
Left voicemail to remind patient that she must get labs and EKG completed before starting VLCD tomorrow (scheduled appointment with RD 8am 3/3)  Informed pt to call back with questions

## 2021-03-05 ENCOUNTER — OFFICE VISIT (OUTPATIENT)
Dept: OBGYN CLINIC | Facility: CLINIC | Age: 56
End: 2021-03-05
Payer: COMMERCIAL

## 2021-03-05 VITALS — DIASTOLIC BLOOD PRESSURE: 90 MMHG | SYSTOLIC BLOOD PRESSURE: 135 MMHG

## 2021-03-05 DIAGNOSIS — S46.911D RIGHT SHOULDER STRAIN, SUBSEQUENT ENCOUNTER: Primary | ICD-10-CM

## 2021-03-05 PROCEDURE — 99213 OFFICE O/P EST LOW 20 MIN: CPT | Performed by: ORTHOPAEDIC SURGERY

## 2021-03-05 PROCEDURE — 3075F SYST BP GE 130 - 139MM HG: CPT | Performed by: ORTHOPAEDIC SURGERY

## 2021-03-05 PROCEDURE — 1036F TOBACCO NON-USER: CPT | Performed by: ORTHOPAEDIC SURGERY

## 2021-03-05 PROCEDURE — 3080F DIAST BP >= 90 MM HG: CPT | Performed by: ORTHOPAEDIC SURGERY

## 2021-03-05 NOTE — PROGRESS NOTES
Assessment/Plan:  1  Right shoulder strain, subsequent encounter         Scribe Attestation    I,:  Chicho Griffin am acting as a scribe while in the presence of the attending physician :       I,:  Ppier Bradford MD personally performed the services described in this documentation    as scribed in my presence :           Rashi Single upon examination and review of the physical therapy notes has made very good progress with her recovery  She does have some limitation in abduction active range of motion  However, her range of motion and strength are within normal limits in all other planes  I do believe she will benefit continuation of physical therapy  As she has demonstrated improvements with little intervention at this point  I do not recommend and MRI at this time  She may continue activities of daily living as tolerated  I would like to see her back in 4 weeks from repeat evaluation  Subjective:   Sugar Keith is a 50 y o  female who presents to the office today for follow up evaluation of the right shoulder  She notes that she is doing better overall  She does still have intermittent mild catherine pain into the shoulder with reaching but again notes that it is improved from 4 weeks ago  She is pain free at rest  This is as a result of a fall suffered on Janurary 3rd  She has been able to start physical therapy but only has had 2 sessions  She states that she is still experiencing intermittent tingling into the upper extremities, but it is tolerable  She has not needed to us the pain mediatation previously prescribed from her primary care physician  Review of Systems   Constitutional: Negative for chills, fever and unexpected weight change  HENT: Negative for hearing loss, nosebleeds and sore throat  Eyes: Negative for pain, redness and visual disturbance  Respiratory: Negative for cough, shortness of breath and wheezing  Cardiovascular: Negative for chest pain, palpitations and leg swelling  Gastrointestinal: Negative for abdominal pain, nausea and vomiting  Endocrine: Negative for polydipsia and polyuria  Genitourinary: Negative for dysuria and hematuria  Musculoskeletal: Positive for arthralgias and myalgias  See HPI   Skin: Negative for rash and wound  Neurological: Negative for dizziness, numbness and headaches  Psychiatric/Behavioral: Negative for decreased concentration and suicidal ideas  The patient is not nervous/anxious  Past Medical History:   Diagnosis Date    Cancer ([facility] )     L breast-lumpectomy    Contact lens/glasses fitting     History of chemotherapy     History of depression     History of radiation therapy     Hypertension     Kidney stone     Malignant neoplasm of breast ([facility] )     Obesity     Premenopausal menorrhagia 3/28/2014       Past Surgical History:   Procedure Laterality Date    BREAST LUMPECTOMY Left 2006     SECTION      x2    COLONOSCOPY      ENDOMETRIAL ABLATION      FL RETROGRADE PYELOGRAM  2020    INTRAOPERATIVE RADIATION THERAPY ([facility])      last assessed: 7/17/15    DC COLONOSCOPY FLX DX W/COLLJ SPEC WHEN PFRMD N/A 3/22/2019    Procedure: COLONOSCOPY;  Surgeon: Eugene Piper MD;  Location: [place] [address] GI LAB;   Service: Gastroenterology    DC CYSTO/URETERO W/LITHOTRIPSY &INDWELL STENT INSRT Left 2020    Procedure: CYSTOSCOPY URETEROSCOPY WITH LITHOTRIPSY HIGH POWERED LASER, RETROGRADE PYELOGRAM, BASKET STONE EXTRACTION, AND INSERTION STENT URETERAL;  Surgeon: Kelly Malik MD;  Location: [facility] MAIN OR;  Service: Urology       Family History   Problem Relation Age of Onset    Hypertension Family     Hypertension Mother     Heart disease Mother         CHF-related to smoking    ADD / ADHD Daughter     Diabetes Neg Hx     Stroke Neg Hx     Thyroid disease Neg Hx        Social History     Occupational History    Not on file   Tobacco Use    Smoking status: Never Smoker    Smokeless tobacco: Never Used   Substance and Sexual Activity    Alcohol use: No     Comment: socially    Drug use: No    Sexual activity: Not Currently         Current Outpatient Medications:     acetaminophen (TYLENOL) 325 mg tablet, Take 650 mg by mouth every 6 (six) hours as needed for mild pain, Disp: , Rfl:     cyclobenzaprine (FLEXERIL) 5 mg tablet, Take 1 tablet (5 mg total) by mouth daily at bedtime as needed for muscle spasms, Disp: 30 tablet, Rfl: 3    hydrochlorothiazide (HYDRODIURIL) 12 5 mg tablet, TAKE 1 TABLET BY MOUTH EVERY DAY, Disp: 90 tablet, Rfl: 1    Multiple Vitamins-Minerals (HAIR SKIN AND NAILS FORMULA PO), Take 1 capsule by mouth daily after breakfast , Disp: , Rfl:     nabumetone (RELAFEN) 750 mg tablet, Take 1 tablet (750 mg total) by mouth 2 (two) times a day as needed for moderate pain (with food), Disp: 40 tablet, Rfl: 1    Allergies   Allergen Reactions    Percocet [Oxycodone-Acetaminophen] Rash    Percolone [Oxycodone] Rash    Shellfish-Derived Products Rash       Objective:  Vitals:    03/05/21 0807   BP: 135/90       Right Shoulder Exam     Range of Motion   Active abduction: 90   External rotation: 70   Forward flexion: 120   Internal rotation 0 degrees: L2     Muscle Strength   Abduction: 4/5   Internal rotation: 5/5   External rotation: 5/5     Tests   Anderson test: positive  Impingement: negative    Other   Erythema: absent  Scars: absent  Sensation: normal  Pulse: present            Physical Exam  Vitals signs reviewed  HENT:      Head: Normocephalic and atraumatic  Eyes:      General:         Right eye: No discharge  Left eye: No discharge  Conjunctiva/sclera: Conjunctivae normal       Pupils: Pupils are equal, round, and reactive to light  Neck:      Musculoskeletal: Normal range of motion and neck supple  Cardiovascular:      Rate and Rhythm: Normal rate  Pulmonary:      Effort: Pulmonary effort is normal  No respiratory distress     Musculoskeletal:      Comments: As noted in HPI   Skin:     General: Skin is warm and dry  Neurological:      Mental Status: She is alert and oriented to person, place, and time

## 2021-03-11 ENCOUNTER — OFFICE VISIT (OUTPATIENT)
Dept: PHYSICAL THERAPY | Facility: CLINIC | Age: 56
End: 2021-03-11
Payer: COMMERCIAL

## 2021-03-11 DIAGNOSIS — S46.911D STRAIN OF RIGHT SHOULDER, SUBSEQUENT ENCOUNTER: Primary | ICD-10-CM
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
## 2021-03-11 PROCEDURE — 97110 THERAPEUTIC EXERCISES: CPT | Performed by: PHYSICAL THERAPIST

## 2021-03-11 PROCEDURE — 97112 NEUROMUSCULAR REEDUCATION: CPT | Performed by: PHYSICAL THERAPIST

## 2021-03-11 NOTE — PROGRESS NOTES
Daily Note     Today's date: 3/11/2021  Patient name: Lyndsay Coelho  : 1965  MRN: [id number]  Referring provider: Chris Daigle MD  Dx: No diagnosis found  Subjective: Pt denies pain in shoulder  She reports last pain felt yesterday unable to attribute causative factor  Pt acknowledges challenge with est therex       Objective: See treatment diary below  HEP rev  PROM full and pain free  Assessment: Tolerated treatment well  Patient demo good ROM at this time, apprehension with OH motions remains  Pt anticpates DOMS tomorrow  Plan: Continue per plan of care        Precautions: HTN, breast CA in  with chemo and radiation treatment, depression, s/p slip and fall on 1/3/21      Manuals  (1) IE  3/2 (2) 3/11(3)     PROM R shoulder        STM R UE/scapular region                        Neuro Re-Ed        scap retraction x10 hold 5 Yellow tubing shoulder rows and ext 2x10-15 each Yellow 2x15 ea      Body blade   30s x 3       Yellow tubing IR/ER 2x10-15 each Yellow 2x15 ea       Supine horiz abd w/ scap sq x 20 total Yellow Tband 15x       90 deg supine alt isos 3x30 sec rounds Iso Abd with shldr flex Red Tband 2x10                     Ther Ex        AROM shoulder flex/abd x10 ea hold 5  Sup flex 2lbs 2x10, SL Abd 20x     Cervical SB stretch x10 hold 5  Rev     Cervical rot stretch x10 hold 5  Rev     Postural education performed x2 mins      TB ext NV       Pulley flex/abd NV         PROM R shoulder x6-8 min all directions w/ end range holds  5'       Review of self mobility work x 2-3 mins                                                       Modalities        MHP

## 2021-03-16 ENCOUNTER — TELEPHONE (OUTPATIENT)
Dept: BARIATRICS | Facility: CLINIC | Age: 56
End: 2021-03-16

## 2021-03-16 ENCOUNTER — OFFICE VISIT (OUTPATIENT)
Dept: PHYSICAL THERAPY | Facility: CLINIC | Age: 56
End: 2021-03-16
Payer: COMMERCIAL
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
## 2021-03-16 DIAGNOSIS — S46.911D STRAIN OF RIGHT SHOULDER, SUBSEQUENT ENCOUNTER: Primary | ICD-10-CM

## 2021-03-16 PROCEDURE — 97112 NEUROMUSCULAR REEDUCATION: CPT

## 2021-03-16 PROCEDURE — 97110 THERAPEUTIC EXERCISES: CPT

## 2021-03-16 NOTE — PROGRESS NOTES
Daily Note     Today's date: 3/16/2021  Patient name: Дмитрий Noble  : 1965  MRN: [id number]  Referring provider: Alice Ward MD  Dx:   Encounter Diagnosis     ICD-10-CM    1  Strain of right shoulder, subsequent encounter  S46 911D    2  Cause of injury, accidental fall, subsequent encounter  W19  XXXD                   Subjective: Pt notes no new complaints  Felt good after last session and feels she is improving  Notes that pain is much more manageable overall  Feels encouraged by progress  Objective: requires cueing to prevent shoulder hike during scaption work but is able to correct and maintain  Assessment: Tolerated treatment well  Patient demonstrated fatigue post treatment and would benefit from continued PT  Fatigue but no increase in pain by end of session  Does well w/ exercise program and is appropriate for increases in resistance training barring setback  Plan: Continue per plan of care   loop work, overhead work, carry work     Precautions: HTN, breast CA in  with chemo and radiation treatment, depression, s/p slip and fall on 1/3/21      Manuals  (1) IE  3/2 (2) 3/11(3) 3/16 (4)     PROM R shoulder        STM R UE/scapular region                        Neuro Re-Ed        scap retraction x10 hold 5 Yellow tubing shoulder rows and ext 2x10-15 each Yellow 2x15 ea  GTB 3x10 rows and ext     Body blade   30s x 3       Yellow tubing IR/ER 2x10-15 each Yellow 2x15 ea GTB IR 3x10, RTB ER 3x10       Supine horiz abd w/ scap sq x 20 total Yellow Tband 15x       90 deg supine alt isos 3x30 sec rounds Iso Abd with shldr flex Red Tband 2x10 x1 set         scaption 3# 3x10, overhead press 2x10            Ther Ex        AROM shoulder flex/abd x10 ea hold 5  Sup flex 2lbs 2x10, SL Abd 20x     Cervical SB stretch x10 hold 5  Rev rev    Cervical rot stretch x10 hold 5  Rev rev    Postural education performed x2 mins      TB ext NV       Pulley flex/abd NV   x3-4 mins      PROM R shoulder x6-8 min all directions w/ end range holds  5' PROM for R shoulder x 3-4 mins       Review of self mobility work x 2-3 mins   Review of HEP and POC x 2-3 mins                                                    Modalities        P

## 2021-03-23 ENCOUNTER — OFFICE VISIT (OUTPATIENT)
Dept: PHYSICAL THERAPY | Facility: CLINIC | Age: 56
End: 2021-03-23
Payer: COMMERCIAL
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
## 2021-03-23 DIAGNOSIS — S46.911D STRAIN OF RIGHT SHOULDER, SUBSEQUENT ENCOUNTER: Primary | ICD-10-CM

## 2021-03-23 PROCEDURE — 97112 NEUROMUSCULAR REEDUCATION: CPT

## 2021-03-23 PROCEDURE — 97530 THERAPEUTIC ACTIVITIES: CPT

## 2021-03-23 PROCEDURE — 97110 THERAPEUTIC EXERCISES: CPT

## 2021-03-23 NOTE — PROGRESS NOTES
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  Pt states that she does experience numbness and tingling in her right forearm and in the hand (all fingers)  Pt states that her shoulder pain is "nowhere near as bad as it was before "       Objective: See treatment diary below    Assessment: Pt tolerated treatment well  Pt demonstrates improved shoulder AROM in flexion and abduction and improving strength  Pt demonstrates good mechanics with scaption and overhead press  Pt is most limited in R shoulder ER strength, however denies pain with this motion  Pt expresses that she would like to decrease the frequency in which she attends therapy to ~1x every 2 weeks in order to make sure she continues her progress on her own in preparation for orthopedic follow up  Discussed this with primary PT and he agreed with this plan  Pt states she is clear on her HEP at this time  Plan: Continue per plan of care  Progress note during next visit           Precautions: HTN, breast CA in  with chemo and radiation treatment, depression, s/p slip and fall on 1/3/21      Manuals  (1) IE  3/2 (2) 3/11(3) 3/16 (4)  3/23 (5)   PROM R shoulder        STM R UE/scapular region                        Neuro Re-Ed        scap retraction x10 hold 5 Yellow tubing shoulder rows and ext 2x10-15 each Yellow 2x15 ea  GTB 3x10 rows and ext  Blue TB 3x10 rows and ext   Body blade   30s x 3       Yellow tubing IR/ER 2x10-15 each Yellow 2x15 ea GTB IR 3x10, RTB ER 3x10  Yellow tubing IR/ER 20x ea        Supine horiz abd w/ scap sq x 20 total Yellow Tband 15x       90 deg supine alt isos 3x30 sec rounds Iso Abd with shldr flex Red Tband 2x10 x1 set         scaption 3# 3x10, overhead press 2x10 Scaption 3x10 4#, OHP 2x10            Ther Ex        AROM shoulder flex/abd x10 ea hold 5  Sup flex 2lbs 2x10, SL Abd 20x     Cervical SB stretch x10 hold 5  Rev rev    Cervical rot stretch x10 hold 5  Rev rev    Postural education performed x2 mins      TB ext NV       Pulley flex/abd NV   x3-4 mins      PROM R shoulder x6-8 min all directions w/ end range holds  5' PROM for R shoulder x 3-4 mins  Reviewed HEP     Review of self mobility work x 2-3 mins   Review of HEP and POC x 2-3 mins Review D/C planning and re-eval next visit                                                    Modalities        P

## 2021-03-24 ENCOUNTER — OFFICE VISIT (OUTPATIENT)
Dept: BARIATRICS | Facility: CLINIC | Age: 56
End: 2021-03-24

## 2021-03-24 VITALS — WEIGHT: 210.5 LBS | BODY MASS INDEX: 35.07 KG/M2 | HEIGHT: 65 IN

## 2021-03-24 DIAGNOSIS — R63.5 ABNORMAL WEIGHT GAIN: Primary | ICD-10-CM

## 2021-03-24 PROCEDURE — WMBVRGE

## 2021-03-24 PROCEDURE — WMBAR

## 2021-03-24 PROCEDURE — 3008F BODY MASS INDEX DOCD: CPT | Performed by: ORTHOPAEDIC SURGERY

## 2021-03-24 PROCEDURE — RECHECK

## 2021-03-24 PROCEDURE — WMDI30

## 2021-03-24 NOTE — PROGRESS NOTES
Weight Management Medical Nutrition Assessment  Pt presented for menu planning session  Today's weight is 210 5#  She is motivated to lose weight because she reports "it's her time" to focus on herself  Her kids are going away to college and "empty mane"  She reports that she just recently lost her job so was doing a lot of stress eating  Per dietary recall, she eats 3x/day and sometimes has a snack at nighttime  She reports that she hasn't been that active lately either  Overall she is very ready for a change and losing weight  She is interested in partial program  Meal plan created, recommended 8077-4361 kcal/day, weighing/measuring food and consistently exercising again  In office today she only had enough money for one meal replacement box and one protein bar box  Sent patient email with product list as well, she states she will get back with the rest of her order before the end of the week  Will return in 2 weeks  Explained RD BUNDLE packages as well       Patient seen by Medical Provider in past 6 months:  no  Requested to schedule appointment with Medical Provider:  Yes     Anthropometric Measurements  Start Weight (#): 204 8 lbs 12/31/19  Current Weight (#): 210 5#   TBW % Change from start weight: n/a  Ideal Body Weight (#): 125 #  Goal Weight (#): 20-30# by June     Weight Loss History  Previous weight loss attempts: Exercise  Self Created Diets (Portion Control, Healthy Food Choices, etc )     Food and Nutrition Related History     Food Recall  Breakfast: (9am): smoothie with oikos triple zero vanilla yogurt, fruit, collagen                          Snack: --- sometimes cashews  Lunch: (1pm): chicken sandwich, grapes  Snack: ---  Dinner: (6pm): 2 turkey burgers + cake  Snack: varies     Beverages: water and coffee/tea  Volume of beverage intake:  ~120 oz water; herbal tea     Weekends: Same  Cravings: varies  Trouble area of day: sometimes between breakfast and lunch     Frequency of Eating out: varies  Food restrictions: shellfish  Cooking: self   Food Shopping: self     Physical Activity Intake  Activity: started going back to gym (2 weeks)  Frequency: infrequent  Physical limitations/barriers to exercise: n/a     Estimated Needs  Energy  Bear Melanie Energy Needs: BMR : 1540  kcal  1-2# loss weekly sedentary:  848-1348 kcal  1-2# loss weekly lightly active: 8810-1996 kcal  Protein: 68-85 gm     (1 2-1 5g/kg IBW)  Fluid: 66 oz     (35mL/kg IBW)     Nutrition Diagnosis  Yes;     Overweight/obesity  related to Excess energy intake as evidenced by  BMI more than normative standard for age and sex (obesity-grade II)     Nutrition Intervention     Nutrition Prescription  PARTIAL PROGRAM  Calories: 1469-9895 calories  Protein: 100 gm  Fluid: >80 oz     Nutrition Education:     low/high oxalate foods  Lean protein food choices  Healthy snack options  Food journaling tips     Nutrition Counseling:  Strategies: meal planning, portion sizes, healthy snack choices, hydration, fiber intake, protein intake, exercise, food journal     Monitoring and Evaluation:  Evaluation criteria:  Energy Intake  Meet protein needs  Maintain adequate hydration  Monitor weekly weight  Meal planning/preparation  Food journal   Decreased portions at mealtimes and snacks  Physical activity      Barriers to learning:none  Readiness to change: Preparation:  (Getting ready to change)   Comprehension: good  Expected Compliance: good

## 2021-03-27 ENCOUNTER — IMMUNIZATIONS (OUTPATIENT)
Dept: FAMILY MEDICINE CLINIC | Facility: HOSPITAL | Age: 56
End: 2021-03-27

## 2021-03-27 DIAGNOSIS — Z23 ENCOUNTER FOR IMMUNIZATION: Primary | ICD-10-CM

## 2021-03-27 PROCEDURE — 91300 SARS-COV-2 / COVID-19 MRNA VACCINE (PFIZER-BIONTECH) 30 MCG: CPT

## 2021-03-27 PROCEDURE — 0001A SARS-COV-2 / COVID-19 MRNA VACCINE (PFIZER-BIONTECH) 30 MCG: CPT

## 2021-04-06 ENCOUNTER — EVALUATION (OUTPATIENT)
Dept: PHYSICAL THERAPY | Facility: CLINIC | Age: 56
End: 2021-04-06
Payer: COMMERCIAL

## 2021-04-06 ENCOUNTER — OFFICE VISIT (OUTPATIENT)
Dept: BARIATRICS | Facility: CLINIC | Age: 56
End: 2021-04-06

## 2021-04-06 ENCOUNTER — TELEPHONE (OUTPATIENT)
Dept: BARIATRICS | Facility: CLINIC | Age: 56
End: 2021-04-06

## 2021-04-06 VITALS — HEIGHT: 65 IN | WEIGHT: 213.5 LBS | TEMPERATURE: 97.1 F | BODY MASS INDEX: 35.57 KG/M2
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
## 2021-04-06 DIAGNOSIS — R63.5 ABNORMAL WEIGHT GAIN: Primary | ICD-10-CM

## 2021-04-06 DIAGNOSIS — R63.5 ABNORMAL WEIGHT GAIN: ICD-10-CM

## 2021-04-06 DIAGNOSIS — E66.9 CLASS 1 OBESITY: ICD-10-CM

## 2021-04-06 DIAGNOSIS — S46.911D STRAIN OF RIGHT SHOULDER, SUBSEQUENT ENCOUNTER: Primary | ICD-10-CM

## 2021-04-06 DIAGNOSIS — E66.9 OBESITY, CLASS II, BMI 35-39.9: Primary | ICD-10-CM

## 2021-04-06 PROCEDURE — 97110 THERAPEUTIC EXERCISES: CPT

## 2021-04-06 PROCEDURE — 97112 NEUROMUSCULAR REEDUCATION: CPT

## 2021-04-06 PROCEDURE — RECHECK

## 2021-04-06 RX ORDER — BUPROPION HYDROCHLORIDE 150 MG/1
TABLET, EXTENDED RELEASE ORAL
Qty: 60 TABLET | Refills: 0 | Status: SHIPPED | OUTPATIENT
Start: 2021-04-06 | End: 2021-04-28

## 2021-04-06 NOTE — PROGRESS NOTES
Patient wanted to come in to talk to RD for a few minutes regarding diet/meal plan and weight  She is very stressed and anxious  Support/encouragement provided to patient  She gained a few pounds but admits that she hasn't been tracking, over snacks, and had a rough Easter weekend  Interested in appetite suppressant  Will message Dr Katia Valadez  Pt has meal planning session set up with RD tomorrow

## 2021-04-06 NOTE — PROGRESS NOTES
Progress Note     Today's date: 2021  Patient name: Samy Oviedo  : 1965  MRN: [id number]  Referring provider: Heather Holland MD  Dx:   Encounter Diagnosis     ICD-10-CM    1  Strain of right shoulder, subsequent encounter  S46 911D    2  Cause of injury, accidental fall, subsequent encounter  W19  XXXD                   Update 21: Pt emailed me to cancel her final check in, does not feels that she needs it  Comfortable w/ progress and HEP  Will call w/ any future issues  Subjective: Pt reports good progress since starting skilled PT  Notes that pain is well controlled, notes that her strength is much better, sleeping is better, and she is back to doing most things without thinking about shoulder  Has been performing class workouts in gym w/ good success and is encouraged by this  She is hopeful that continued strength work will help, wants to follow up in a month to ensure continued progress   Functional update below:     Goals  Short term goals: (to be met in 4 weeks) ALL ACHIEVED   + patient will report a 50% improvement in function   + Patient will be independent in basic HEP   +Patient able to sleep x 4 hours without being awoken by pain R shoulder  +Patient able to perform UE ADLS with pain less than 3/10 R shoulder    Long term goals: (LTGs to be met in 8 weeks) ALL PROGRESSED or ACHIEVED   + Patient will report 85 % improvement improvement in symptoms with ADL's  + Patient will have full range of motion right shoulder in order to perform all overhead ADLs   + Patient will be able to sleep x 6 hours without being awoken by pain R shoulder  + Patient will increase FOTO score by 10 points  + Patient will demonstrate appropriate scapulohumeral rhythm with reaching overhead   + patient will demonstrate functional reaching without compensatory patterns    + Patient will be independent in a comprehensive home exercise program     *New goals to be made should treatment continue past one month check in    Final goal from 21:  1) Pt will be pain free w/ all function for 2 consecutive weeks prior to final check in, will display independence w/ HEP      Pain  Current pain ratin  At best pain ratin  At worst pain ratin  Location: R shoulder pain and into R hand  Quality: tight, throbbing, squeezing, radiating, burning, needle-like and pressure  Relieving factors: medications, change in position, relaxation and support  Aggravating factors: standing, overhead activity and lifting  Progression: improved in all respects     Social Support  Lives in: condominium  Lives with: spouse    Employment status: working ()  Hand dominance: right  Exercise history: exercise daily and "try to keep in shape"      Diagnostic Tests  X-ray: normal (-) fracture  Treatments  Previous treatment: medication  Current treatment: medication  Patient Goals  Patient goals for therapy: decreased pain, decreased edema, increased motion, independence with ADLs/IADLs and increased strength  Patient goal: "get better 100%"        Objective     Postural Observations  Seated posture: fair  Standing posture: fair      Cervical/Thoracic Screen   Cervical range of motion within normal limits with the following exceptions: Cervical ROM decreased by 25% B     Neurological Testing     Sensation     Shoulder   Left Shoulder   Intact: light touch    Right Shoulder   Intact: light touch    Additional Neurological Details  Pt c/o intermittent numbness/tingling R hand    Active Range of Motion   Normal B shoulder AROM, pain free     Scapular Mobility   Left Shoulder   Scapular mobility: good    Right Shoulder   Scapular mobility: good    Strength/Myotome Testing     Left Shoulder     Planes of Motion   Flexion: 4   Extension: 4   Abduction: 4   Adduction: 4     Right Shoulder     Planes of Motion   Flexion: 4  Extension: 4   Abduction: 4   Adduction: 4     B MT and LT grossly 4/5     Special testing not performed today, not indicated       Assessment: Tolerated treatment well  Patient demonstrated fatigue post treatment and would benefit from continued PT  Pt has done well over the last 4-5 weeks  She has improved in all measurable aspects of care and is pleased w/ progress  Her HEP was reviewed and intensity of program was increased today  She will continue w/ this program over the next 4 weeks and will call or check in prior to that should an issues arise  Barring setback, anticipate d/c at one month check in  Pt in agreement w/ plan      Plan: Continue per plan of care  plan on d/c in one month barring setback - review and increase HEP      Precautions: HTN, breast CA in 2006 with chemo and radiation treatment, depression, s/p slip and fall on 1/3/21      Manuals 4/6 (6) PN   3/11(3) 3/16 (4)  3/23 (5)   PROM R shoulder        STM R UE/scapular region                        Neuro Re-Ed        scap retraction  Yellow tubing shoulder rows and ext 2x10-15 each Yellow 2x15 ea  GTB 3x10 rows and ext  Blue TB 3x10 rows and ext   Body blade Alt isos 3x30 sec in supine 90 deg   30s x 3       Yellow tubing IR/ER 2x10-15 each Yellow 2x15 ea GTB IR 3x10, RTB ER 3x10  Yellow tubing IR/ER 20x ea        Supine horiz abd w/ scap sq x 20 total Yellow Tband 15x       90 deg supine alt isos 3x30 sec rounds Iso Abd with shldr flex Red Tband 2x10 x1 set      3# scaption and shoulder flexion x 20 each, overhead press 2x10   scaption 3# 3x10, overhead press 2x10 Scaption 3x10 4#, OHP 2x10     Bent over row 5# 2x10-15       Ther Ex        AROM shoulder flex/abd   Sup flex 2lbs 2x10, SL Abd 20x     Cervical SB stretch   Rev rev    Cervical rot stretch   Rev rev    Postural education  x2 mins      TB ext        Pulley flex/abd    x3-4 mins     Shoulder PROM x 3-4 min w/ end range holds as able  PROM R shoulder x6-8 min all directions w/ end range holds  5' PROM for R shoulder x 3-4 mins  Reviewed HEP    FOTO, ROM, MMT check - HEP discussion x 6-7 mins Review of self mobility work x 2-3 mins   Review of HEP and POC x 2-3 mins Review D/C planning and re-eval next visit                                                    Modalities        MHP

## 2021-04-06 NOTE — PROGRESS NOTES
Pt with increased anxiety due to being laid off work and increased weight gain  Interested in an appetite suppresant  Discussed diff options; with hx renal stones topiramate would not be a good option  Discussed phentermine can worsen her anxiety and BP  Wellbutrin to a lesser extent    will try wellbutrin, advised her to check her BP daily for next 2 weeks and let me know if SBP >140s  Explained potential side effects  Pt to let me know if having any issues  She will call the office to set up a return appt with me in 1 month

## 2021-04-06 NOTE — TELEPHONE ENCOUNTER
Patient called in requesting to speak with you  She said she has some questions but didn't elaborate  She asks that you call her before end of business today

## 2021-04-14 ENCOUNTER — APPOINTMENT (OUTPATIENT)
Dept: PHYSICAL THERAPY | Facility: CLINIC | Age: 56
End: 2021-04-14
Payer: COMMERCIAL

## 2021-04-16 ENCOUNTER — OFFICE VISIT (OUTPATIENT)
Dept: OBGYN CLINIC | Facility: CLINIC | Age: 56
End: 2021-04-16
Payer: COMMERCIAL

## 2021-04-16 VITALS — SYSTOLIC BLOOD PRESSURE: 140 MMHG | DIASTOLIC BLOOD PRESSURE: 88 MMHG | HEART RATE: 67 BPM

## 2021-04-16 DIAGNOSIS — S46.911D SHOULDER STRAIN, RIGHT, SUBSEQUENT ENCOUNTER: Primary | ICD-10-CM

## 2021-04-16 PROCEDURE — 99213 OFFICE O/P EST LOW 20 MIN: CPT | Performed by: ORTHOPAEDIC SURGERY

## 2021-04-16 NOTE — PROGRESS NOTES
Assessment/Plan:  1  Shoulder strain, right, subsequent encounter         Scribe Attestation    I,:  Asher Cardenas am acting as a scribe while in the presence of the attending physician :       I,:  July Kelly MD personally performed the services described in this documentation    as scribed in my presence :           Jordana  upon examination and review of the physical therapy notes today does continue to demonstrate improvements with overall function with range of motion strength of her right shoulder  Clinically she does continue to demonstrate improvements in all planes  I do feel that she will continue to improve as time goes on and encouraged her to continue to be active with participation in her gym activities  Jordana Quach verbalized understanding of all information provided to her today and had no for questions  She may follow up with me on an as-needed basis  Subjective:   Haylie Burk is a 50 y o  female who presents to the office today for follow-up evaluation of her right shoulder  And she is happy to report that she is feeling better in regards to her right shoulder  She has had significant improvement since her fall 1/3/2021  She states that overall she is relatively asymptomatic during activities of daily living  However remarks reaching behind her back as well as occasional reaching away from her body will exacerbate some discomfort into her shoulder  She does remark on significant improvements of range of motion and strength in all planes  She has been participating in physical therapy and does remark on objective improvements with measurements strength testing  She denies any distal paresthesias  She also notes that she has been able to return to the gym however has been cognizant with her limitations in weight lifting  Review of Systems   Constitutional: Negative for chills, fever and unexpected weight change  HENT: Negative for hearing loss, nosebleeds and sore throat  Eyes: Negative for pain, redness and visual disturbance  Respiratory: Negative for cough, shortness of breath and wheezing  Cardiovascular: Negative for chest pain, palpitations and leg swelling  Gastrointestinal: Negative for abdominal pain, nausea and vomiting  Endocrine: Negative for polydipsia and polyuria  Genitourinary: Negative for dysuria and hematuria  Musculoskeletal: Positive for arthralgias and myalgias  See HPI   Skin: Negative for rash and wound  Neurological: Negative for dizziness, numbness and headaches  Psychiatric/Behavioral: Negative for decreased concentration and suicidal ideas  The patient is not nervous/anxious  Past Medical History:   Diagnosis Date    Cancer ([facility] [address] )     L breast-lumpectomy    Contact lens/glasses fitting     History of chemotherapy     History of depression     History of radiation therapy     Hypertension     Kidney stone     [facility]     Obesity     Premenopausal menorrhagia 3/28/2014       Past Surgical History:   Procedure Laterality Date    BREAST LUMPECTOMY Left 2006     SECTION      x2    COLONOSCOPY      ENDOMETRIAL ABLATION      FL RETROGRADE PYELOGRAM  2020    INTRAOPERATIVE RADIATION THERAPY ([facility])      last assessed: 7/17/15    [place] COLONOSCOPY FLX DX W/COLLJ SPEC WHEN PFRMD N/A 3/22/2019    Procedure: COLONOSCOPY;  Surgeon: Ishan Bennett MD;  Location: [facility];   Service: Gastroenterology    [place] CYSTO/URETERO W/LITHOTRIPSY &INDWELL STENT INSRT Left 2020    Procedure: CYSTOSCOPY URETEROSCOPY WITH LITHOTRIPSY HIGH POWERED LASER, RETROGRADE PYELOGRAM, BASKET STONE EXTRACTION, AND INSERTION STENT URETERAL;  Surgeon: Bipin Ramirez MD;  Location[facility];  Service: Urology       Family History   Problem Relation Age of Onset    Hypertension Family     Hypertension Mother     Heart disease Mother         CHF-related to smoking    ADD / ADHD Daughter    Denise Mckeon Diabetes Neg Hx     Stroke Neg Hx     Thyroid disease Neg Hx        Social History     Occupational History    Not on file   Tobacco Use    Smoking status: Never Smoker    Smokeless tobacco: Never Used   Substance and Sexual Activity    Alcohol use: No     Comment: socially    Drug use: No    Sexual activity: Not Currently         Current Outpatient Medications:     acetaminophen (TYLENOL) 325 mg tablet, Take 650 mg by mouth every 6 (six) hours as needed for mild pain, Disp: , Rfl:     buPROPion (WELLBUTRIN SR) 150 mg 12 hr tablet, Take one tab once daily for 1 week, then take one tab twice daily  , Disp: 60 tablet, Rfl: 0    cyclobenzaprine (FLEXERIL) 5 mg tablet, Take 1 tablet (5 mg total) by mouth daily at bedtime as needed for muscle spasms, Disp: 30 tablet, Rfl: 3    hydrochlorothiazide (HYDRODIURIL) 12 5 mg tablet, TAKE 1 TABLET BY MOUTH EVERY DAY, Disp: 90 tablet, Rfl: 1    Multiple Vitamins-Minerals (HAIR SKIN AND NAILS FORMULA PO), Take 1 capsule by mouth daily after breakfast , Disp: , Rfl:     nabumetone (RELAFEN) 750 mg tablet, Take 1 tablet (750 mg total) by mouth 2 (two) times a day as needed for moderate pain (with food), Disp: 40 tablet, Rfl: 1    Allergies   Allergen Reactions    Percocet [Oxycodone-Acetaminophen] Rash    Percolone [Oxycodone] Rash    Shellfish-Derived Products - Food Allergy Rash       Objective:  Vitals:    04/16/21 0757   BP: 140/88   Pulse: 67       Right Shoulder Exam     Tenderness   The patient is experiencing no tenderness  Range of Motion   Active abduction: 160   External rotation: 70   Internal rotation 0 degrees: L1     Muscle Strength   Right shoulder normal muscle strength: abduction: 4+/5  Internal rotation: 5/5   External rotation: 5/5     Other   Erythema: absent  Scars: absent  Sensation: normal  Pulse: present            Physical Exam  Vitals signs reviewed  HENT:      Head: Normocephalic and atraumatic     Eyes:      General:         Right eye: No discharge  Left eye: No discharge  Conjunctiva/sclera: Conjunctivae normal       Pupils: Pupils are equal, round, and reactive to light  Neck:      Musculoskeletal: Normal range of motion and neck supple  Cardiovascular:      Rate and Rhythm: Normal rate  Pulmonary:      Effort: Pulmonary effort is normal  No respiratory distress  Musculoskeletal:      Comments: As noted in HPI   Skin:     General: Skin is warm and dry  Neurological:      Mental Status: She is alert and oriented to person, place, and time

## 2021-04-17 ENCOUNTER — IMMUNIZATIONS (OUTPATIENT)
Dept: FAMILY MEDICINE CLINIC | Facility: HOSPITAL | Age: 56
End: 2021-04-17

## 2021-04-17 DIAGNOSIS — Z23 ENCOUNTER FOR IMMUNIZATION: Primary | ICD-10-CM

## 2021-04-17 PROCEDURE — 91300 SARS-COV-2 / COVID-19 MRNA VACCINE (PFIZER-BIONTECH) 30 MCG: CPT

## 2021-04-17 PROCEDURE — 0002A SARS-COV-2 / COVID-19 MRNA VACCINE (PFIZER-BIONTECH) 30 MCG: CPT

## 2021-04-28 DIAGNOSIS — E66.9 OBESITY, CLASS II, BMI 35-39.9: ICD-10-CM

## 2021-04-28 DIAGNOSIS — R63.5 ABNORMAL WEIGHT GAIN: ICD-10-CM

## 2021-04-28 RX ORDER — BUPROPION HYDROCHLORIDE 150 MG/1
TABLET, EXTENDED RELEASE ORAL
Qty: 60 TABLET | Refills: 0 | Status: SHIPPED | OUTPATIENT
Start: 2021-04-28 | End: 2021-05-03

## 2021-05-03 ENCOUNTER — OFFICE VISIT (OUTPATIENT)
Dept: BARIATRICS | Facility: CLINIC | Age: 56
End: 2021-05-03
Payer: COMMERCIAL

## 2021-05-03 VITALS
DIASTOLIC BLOOD PRESSURE: 72 MMHG | HEART RATE: 77 BPM | BODY MASS INDEX: 35.09 KG/M2 | HEIGHT: 65 IN | WEIGHT: 210.6 LBS | TEMPERATURE: 97.3 F | SYSTOLIC BLOOD PRESSURE: 118 MMHG

## 2021-05-03 DIAGNOSIS — E66.01 SEVERE OBESITY (BMI 35.0-39.9) WITH COMORBIDITY (HCC): Primary | ICD-10-CM

## 2021-05-03 DIAGNOSIS — I10 ESSENTIAL HYPERTENSION: ICD-10-CM

## 2021-05-03 PROCEDURE — 3008F BODY MASS INDEX DOCD: CPT | Performed by: INTERNAL MEDICINE

## 2021-05-03 PROCEDURE — 1036F TOBACCO NON-USER: CPT | Performed by: INTERNAL MEDICINE

## 2021-05-03 PROCEDURE — 3074F SYST BP LT 130 MM HG: CPT | Performed by: INTERNAL MEDICINE

## 2021-05-03 PROCEDURE — 3078F DIAST BP <80 MM HG: CPT | Performed by: INTERNAL MEDICINE

## 2021-05-03 PROCEDURE — 99213 OFFICE O/P EST LOW 20 MIN: CPT | Performed by: INTERNAL MEDICINE

## 2021-05-03 RX ORDER — BUPROPION HYDROCHLORIDE 300 MG/1
300 TABLET ORAL DAILY
Qty: 30 TABLET | Refills: 1 | Status: SHIPPED | OUTPATIENT
Start: 2021-05-03 | End: 2021-05-20 | Stop reason: ALTCHOICE

## 2021-05-03 NOTE — PROGRESS NOTES
Assessment/Plan:  Oralia Tong was seen today for follow-up  Diagnoses and all orders for this visit:    Essential hypertension  Stable on HCTZ  Weight loss and increasing activity level should help  Limit sodium intake <2500mg per day    Severe obesity (BMI 35 0-39  9) with comorbidity (HCC)  Goals:  Mindful eating  Goal protein intake 90g per day  2321-8201 calories  Incorporate cardio exercise  Will switch Wellbutrin  to 300 XL as pt forgets to take the second pill    Follow up in approximately 6 weeks with Non-Surgical Physician/Advanced Practitioner  Subjective:   Chief Complaint   Patient presents with    Follow-up     PT IS HERE FOR [facility] F/U  Patient ID: Elisabet Morelos  is a 50 y o  female with excess weight/obesity here to pursue weight management    Patient is pursuing Conservative Program      HPI  -Initial weight loss goal of 5-10% weight loss for improved health  Wt Readings from Last 10 Encounters:   05/03/21 95 5 kg (210 lb 9 6 oz)   04/06/21 96 8 kg (213 lb 8 oz)   03/24/21 95 5 kg (210 lb 8 oz)   02/15/21 91 6 kg (202 lb)   02/08/21 91 6 kg (202 lb)   02/05/21 91 6 kg (202 lb)   01/20/21 94 3 kg (208 lb)   01/05/21 94 3 kg (208 lb)   12/24/20 92 6 kg (204 lb 3 2 oz)   11/30/20 92 5 kg (204 lb)     Initial weight: 202  Current weight: 210  Change in weight: +8 lbs  Taking Wellbutrin- thinks it has been helping but +wt gain since February  Has not been taking it at night regularly  Does not wish to know her weight today  Stress eating, a lot of carbs   Goal: 130 lbs    B- yogurt with fruit  S-  L- pizza   S-   D- popcorn  S-  Drinks- 1 gallon water  Alcohol- no  Exercise- going to the gym: weight training 4x week 30 minutes       The following portions of the patient's history were reviewed and updated as appropriate: allergies, current medications, past family history, past medical history, past social history, past surgical history, and problem list     Review of Systems   Respiratory: Negative  Cardiovascular: Negative  Gastrointestinal: Negative  Musculoskeletal: Negative  Psychiatric/Behavioral: Negative  Objective:  /72 (BP Location: Left arm, Patient Position: Sitting, Cuff Size: Large)   Pulse 77   Temp (!) 97 3 °F (36 3 °C) (Tympanic)   Ht 5' 5" (1 651 m)   Wt 95 5 kg (210 lb 9 6 oz)   BMI 35 05 kg/m²   Constitutional: Well-developed, well-nourished and obese Body mass index is 35 05 kg/m²  Monia Le HEENT: No conjunctival pallor or jaundice  Pulmonary: No increased work of breathing or signs of respiratory distress  CV: Normal rate, well-perfused  GI: Obese  Non-distended  MSK: No edema   Neuro: Oriented to person, place and time  Normal Speech  Normal gait  Psych: Normal affect and mood       Labs and Imaging  Reviewed

## 2021-05-06 ENCOUNTER — TELEPHONE (OUTPATIENT)
Dept: BARIATRICS | Facility: CLINIC | Age: 56
End: 2021-05-06

## 2021-05-06 ENCOUNTER — PATIENT MESSAGE (OUTPATIENT)
Dept: BARIATRICS | Facility: CLINIC | Age: 56
End: 2021-05-06

## 2021-05-06 NOTE — TELEPHONE ENCOUNTER
----- Message from Jaclyn Miller sent at 5/6/2021  9:23 AM EDT -----  SHE WOULD LIKE FOR YOU TO [address] HER A CALL [phone]

## 2021-05-06 NOTE — TELEPHONE ENCOUNTER
I called the pt-   She is having constipation since starting on Wellbutrin 300mg XL 4 days ago  Discussed adding bowel regimen  Sent her a [facility] msg for her as well

## 2021-05-07 ENCOUNTER — TELEPHONE (OUTPATIENT)
Dept: FAMILY MEDICINE CLINIC | Facility: CLINIC | Age: 56
End: 2021-05-07

## 2021-05-07 NOTE — TELEPHONE ENCOUNTER
5/7/2021 1:55 PM Returned call to Geovanna  We discussed her knee x-ray in November 2020  We discussed that there were no findings of arthritis  Patient is currently having some soreness in her knee after doing Sandhya for the 1st time  I recommended that she needs to build up her strength in her leg and if her pain continues we will follow the office      Message complete  Jaky Lentz, DO

## 2021-05-07 NOTE — TELEPHONE ENCOUNTER
Calling to speak with dr Kristina De La Rosa  I asked if she had [address], she does but wanted a call back from doctor        Re:  Right knee    Sharon number is [phone]

## 2021-05-19 ENCOUNTER — TELEPHONE (OUTPATIENT)
Dept: BARIATRICS | Facility: CLINIC | Age: 56
End: 2021-05-19

## 2021-05-19 DIAGNOSIS — E66.01 SEVERE OBESITY (BMI 35.0-39.9) WITH COMORBIDITY (HCC): Primary | ICD-10-CM

## 2021-05-19 RX ORDER — PHENTERMINE HYDROCHLORIDE 37.5 MG/1
TABLET ORAL
Qty: 15 TABLET | Refills: 0 | Status: SHIPPED | OUTPATIENT
Start: 2021-05-19 | End: 2021-08-03 | Stop reason: ALTCHOICE

## 2021-05-19 NOTE — TELEPHONE ENCOUNTER
Called pt back  She states she is having insomnia from Wellbutrin and not seeing any weight loss  Will taper it off for the next week and can start taking phentermine 1/2 tablet of 37 5mg in the morning  Explained the potential side effects  Answered all of her questions   If no significant difference in her weight by June will increase the dose to 37 5mg

## 2021-05-19 NOTE — TELEPHONE ENCOUNTER
----- Message from Antoinette Cardoza sent at 5/19/2021  9:51 AM EDT -----  Patient called and is requesting you give a call back  She has some questions she's hoping you can answer

## 2021-05-20 ENCOUNTER — TELEMEDICINE (OUTPATIENT)
Dept: FAMILY MEDICINE CLINIC | Facility: CLINIC | Age: 56
End: 2021-05-20
Payer: COMMERCIAL

## 2021-05-20 DIAGNOSIS — J30.9 ALLERGIC RHINITIS, UNSPECIFIED SEASONALITY, UNSPECIFIED TRIGGER: Primary | ICD-10-CM

## 2021-05-20 PROCEDURE — 1036F TOBACCO NON-USER: CPT | Performed by: FAMILY MEDICINE

## 2021-05-20 PROCEDURE — 99213 OFFICE O/P EST LOW 20 MIN: CPT | Performed by: FAMILY MEDICINE

## 2021-05-20 NOTE — PROGRESS NOTES
Virtual Regular Visit      Assessment/Plan:    Problem List Items Addressed This Visit     None      Visit Diagnoses     Allergic rhinitis, unspecified seasonality, unspecified trigger    -  Primary    flaring, she is going to start Claritin  and if that doesn't improve will add flonase          BMI Counseling: There is no height or weight on file to calculate BMI  The BMI is above normal  Patient referred to weight management due to patient being overweight  she is currently following with weight management  Reason for visit is   Chief Complaint   Patient presents with    Allergies     post nasal drip, congestion, jlopezcma     Virtual Regular Visit        Encounter provider Asif Melo DO    Provider located at [address]  [address] [address]      Recent Visits  No visits were found meeting these conditions  Showing recent visits within past 7 days and meeting all other requirements     Today's Visits  Date Type Provider Dept   05/20/21 [address] [facility] today's visits and meeting all other requirements     Future Appointments  No visits were found meeting these conditions  Showing future appointments within next 150 days and meeting all other requirements        The patient was identified by name and date of birth  Joseph Alberto was informed that this is a telemedicine visit and that the visit is being conducted through [address] [place] [address] Now and patient was informed that this is a secure, HIPAA-compliant platform  She agrees to proceed     My office door was closed  No one else was in the room  She acknowledged consent and understanding of privacy and security of the video platform  The patient has agreed to participate and understands they can discontinue the visit at any time  Patient is aware this is a billable service  Subjective  Joseph Alberto is a 50 y o  female          She has been feeling well  She just got back from [place] and went to visit her best friend  Her symptoms started 2 days ago  She has a post nasal drip  Her throat is a little raw  She has not taken anything for it  Past Medical History:   Diagnosis Date    Cancer ([facility] [address] )     L breast-lumpectomy    Contact lens/glasses fitting     History of chemotherapy     History of depression     History of radiation therapy     Hypertension     Kidney stone     Malignant neoplasm of breast ([facility] [address] )     Obesity (BMI 30-39  9)     Premenopausal menorrhagia 3/28/2014       Past Surgical History:   Procedure Laterality Date    BREAST LUMPECTOMY Left 2006     SECTION      x2    COLONOSCOPY      ENDOMETRIAL ABLATION      FL RETROGRADE PYELOGRAM  2020    INTRAOPERATIVE RADIATION THERAPY ([facility])      last assessed: 7/17/15    MO COLONOSCOPY FLX DX W/COLLJ SPEC WHEN PFRMD N/A 3/22/2019    Procedure: COLONOSCOPY;  Surgeon: Donnell Cedillo MD;  Location: [facility] GI LAB; Service: Gastroenterology    MO CYSTO/URETERO W/LITHOTRIPSY &INDWELL STENT INSRT Left 2020    Procedure: CYSTOSCOPY URETEROSCOPY WITH LITHOTRIPSY HIGH POWERED LASER, RETROGRADE PYELOGRAM, BASKET STONE EXTRACTION, AND INSERTION STENT URETERAL;  Surgeon: Dao Paul MD;  Location[facility];  Service: Urology       Current Outpatient Medications   Medication Sig Dispense Refill    acetaminophen (TYLENOL) 325 mg tablet Take 650 mg by mouth every 6 (six) hours as needed for mild pain      hydrochlorothiazide (HYDRODIURIL) 12 5 mg tablet TAKE 1 TABLET BY MOUTH EVERY DAY 90 tablet 1    Multiple Vitamins-Minerals (HAIR SKIN AND NAILS FORMULA PO) Take 1 capsule by mouth daily after breakfast       phentermine (ADIPEX-P) 37 5 MG tablet Take half tablet once a day 15 tablet 0     No current facility-administered medications for this visit           Allergies   Allergen Reactions    Percocet [Oxycodone-Acetaminophen] Rash    Percolone [Oxycodone] Rash    Shellfish-Derived Products - Food Allergy Rash    Pollen Extract Cough       Review of Systems   Constitutional: Negative for fever  HENT: Positive for sore throat  Respiratory: Positive for cough (dry)  Video Exam    There were no vitals filed for this visit  Physical Exam  Vitals signs reviewed  Constitutional:       General: She is not in acute distress  Pulmonary:      Effort: Pulmonary effort is normal    Neurological:      Mental Status: She is alert  Psychiatric:         Behavior: Behavior normal          Thought Content: Thought content normal          Judgment: Judgment normal           I spent 9 minutes directly with the patient during this visit      VIRTUAL VISIT DISCLAIMER    David Martin acknowledges that she has consented to an online visit or consultation  She understands that the online visit is based solely on information provided by her, and that, in the absence of a face-to-face physical evaluation by the physician, the diagnosis she receives is both limited and provisional in terms of accuracy and completeness  This is not intended to replace a full medical face-to-face evaluation by the physician  David Martin understands and accepts these terms

## 2021-06-10 DIAGNOSIS — I10 ESSENTIAL HYPERTENSION: ICD-10-CM

## 2021-06-10 RX ORDER — HYDROCHLOROTHIAZIDE 12.5 MG/1
TABLET ORAL
Qty: 90 TABLET | Refills: 1 | Status: SHIPPED | OUTPATIENT
Start: 2021-06-10 | End: 2021-12-13

## 2021-06-14 ENCOUNTER — TELEPHONE (OUTPATIENT)
Dept: BARIATRICS | Facility: CLINIC | Age: 56
End: 2021-06-14

## 2021-06-14 NOTE — TELEPHONE ENCOUNTER
----- Message from Yadira Saldaña DO sent at 6/14/2021  1:54 PM EDT -----  Cris Luong calling her back, no answer    ----- Message -----  From: Moon Santos  Sent: 6/11/2021   2:11 PM EDT  To: Yadira Saldaña DO    PT WOULD LIKE YOU TO [address] HER A CALL  [phone]

## 2021-06-25 ENCOUNTER — TELEPHONE (OUTPATIENT)
Dept: FAMILY MEDICINE CLINIC | Facility: CLINIC | Age: 56
End: 2021-06-25

## 2021-06-25 NOTE — TELEPHONE ENCOUNTER
LMOM for pt to call back  I do not see any results in the chart, if this is something she wants added to blood work she will need to discuss that with Dr Leslye Fajardo at her appointment next week    [facility]

## 2021-06-28 NOTE — TELEPHONE ENCOUNTER
Patient advised, appointment r/s for August  Has b/w to be done, will call back if she needs us to print and mail  No further action needed at this time

## 2021-07-16 ENCOUNTER — TELEPHONE (OUTPATIENT)
Dept: OBGYN CLINIC | Facility: CLINIC | Age: 56
End: 2021-07-16

## 2021-07-16 NOTE — TELEPHONE ENCOUNTER
Pt called and would like to speak to you when you have a chance, nothing urgent, just wanted to speak to you about something  Not specific as to what it was  Advised pt you will be back in the office on Monday

## 2021-07-19 NOTE — TELEPHONE ENCOUNTER
Spoke w pt  She has become sexually active more recently with her spouse again and is having discomfort  We reviewed coconut oil, crisco, Key-E and Revaree  Pt does have h/o breast cancer so I would be hesitant to use any types of hormones, even vaginal estrogen for her  She is not interested in hormones at all at this point  Will trial above recommendations and call back with additional concerns and complaints

## 2021-07-22 ENCOUNTER — TELEPHONE (OUTPATIENT)
Dept: FAMILY MEDICINE CLINIC | Facility: CLINIC | Age: 56
End: 2021-07-22

## 2021-07-26 NOTE — TELEPHONE ENCOUNTER
7/26/2021 8:28 AM Returned call to Geovanna  Left message on her voicemail to call the office     Amee Antunez, DO

## 2021-07-28 ENCOUNTER — TELEPHONE (OUTPATIENT)
Dept: BARIATRICS | Facility: CLINIC | Age: 56
End: 2021-07-28

## 2021-07-28 NOTE — TELEPHONE ENCOUNTER
7/28/2021 11:39 AM Called Socorro Wolfe  She has been having a lot of issues with her older daughter  She has been gaining weight because she is emotionally eating  We discussed alternatives to emotional eating       She will keep working with a therapist      Message jermaine Molina, DO

## 2021-08-02 ENCOUNTER — TELEPHONE (OUTPATIENT)
Dept: FAMILY MEDICINE CLINIC | Facility: CLINIC | Age: 56
End: 2021-08-02
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
## 2021-08-03 ENCOUNTER — OFFICE VISIT (OUTPATIENT)
Dept: FAMILY MEDICINE CLINIC | Facility: CLINIC | Age: 56
End: 2021-08-03
Payer: COMMERCIAL

## 2021-08-03 VITALS
SYSTOLIC BLOOD PRESSURE: 126 MMHG | TEMPERATURE: 98.5 F | DIASTOLIC BLOOD PRESSURE: 88 MMHG | RESPIRATION RATE: 18 BRPM | HEIGHT: 65 IN | BODY MASS INDEX: 35.05 KG/M2 | HEART RATE: 68 BPM | OXYGEN SATURATION: 99 %

## 2021-08-03 DIAGNOSIS — Z12.31 SCREENING MAMMOGRAM, ENCOUNTER FOR: ICD-10-CM

## 2021-08-03 DIAGNOSIS — M54.2 NECK PAIN: Primary | ICD-10-CM

## 2021-08-03 PROCEDURE — 99213 OFFICE O/P EST LOW 20 MIN: CPT | Performed by: FAMILY MEDICINE

## 2021-08-03 RX ORDER — CELECOXIB 200 MG/1
200 CAPSULE ORAL 2 TIMES DAILY
Qty: 20 CAPSULE | Refills: 0 | Status: SHIPPED | OUTPATIENT
Start: 2021-08-03 | End: 2021-12-21
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
## 2021-08-04 ENCOUNTER — TELEPHONE (OUTPATIENT)
Dept: BARIATRICS | Facility: CLINIC | Age: 56
End: 2021-08-04

## 2021-08-05 ENCOUNTER — OFFICE VISIT (OUTPATIENT)
Dept: BARIATRICS | Facility: CLINIC | Age: 56
End: 2021-08-05
Payer: COMMERCIAL

## 2021-08-05 VITALS
TEMPERATURE: 97.5 F | SYSTOLIC BLOOD PRESSURE: 130 MMHG | HEART RATE: 65 BPM | HEIGHT: 65 IN | WEIGHT: 211.2 LBS | DIASTOLIC BLOOD PRESSURE: 80 MMHG | BODY MASS INDEX: 35.19 KG/M2

## 2021-08-05 DIAGNOSIS — I10 ESSENTIAL HYPERTENSION: ICD-10-CM

## 2021-08-05 DIAGNOSIS — E66.9 OBESITY, CLASS II, BMI 35-39.9: Primary | ICD-10-CM

## 2021-08-05 PROBLEM — E66.812 OBESITY, CLASS II, BMI 35-39.9: Status: ACTIVE | Noted: 2019-12-31

## 2021-08-05 PROCEDURE — 3079F DIAST BP 80-89 MM HG: CPT | Performed by: PHYSICIAN ASSISTANT

## 2021-08-05 PROCEDURE — 3075F SYST BP GE 130 - 139MM HG: CPT | Performed by: PHYSICIAN ASSISTANT

## 2021-08-05 PROCEDURE — 1036F TOBACCO NON-USER: CPT | Performed by: PHYSICIAN ASSISTANT

## 2021-08-05 PROCEDURE — 99214 OFFICE O/P EST MOD 30 MIN: CPT | Performed by: PHYSICIAN ASSISTANT

## 2021-08-05 NOTE — ASSESSMENT & PLAN NOTE
-Patient is pursuing Conservative Program  -Initial weight loss goal of 5-10% weight loss for improved health  -had constipation and insomnia on wellbutrin  -Likely not a Topamax candidate  If a long time ago and passed easliy could consider with understanding of increased risk of stones  If recent or needing stenting not an option    -starting phentermine 1/2 tab on 8/5/21 at 211 2 lbs -never completed ekg  Will need to have done prior to any refills coming from me  Initial:202 lbs   Current: 211 2 lbs   Change:+9 2 lbs   Goal:130 lbs    Goals:  Food log (ie ) www myfitnesspal com,sparkpeople  com,loseit com,calorieking  com,etc  baritastic  No sugary beverages  At least 64oz of water daily  Increase physical activity by 10 minutes daily   Gradually increase physical activity to a goal of 5 days per week for 30 minutes of MODERATE intensity PLUS 2 days per week of FULL BODY resistance training-continue gym 4 days a week   Mindful eating  Goal protein intake 90g per day  4030-1063 calories  Incorporate cardio exercise  Restart 1/2 tab of Phentermine   Every meal should have protein base  Add fruit to protein shake

## 2021-08-05 NOTE — PATIENT INSTRUCTIONS
Goals: Food log (ie ) www myfitnesspal com,sparkpeople  com,loseit com,calorieking  com,etc  baritastic  No sugary beverages  At least 64oz of water daily  Increase physical activity by 10 minutes daily  Gradually increase physical activity to a goal of 5 days per week for 30 minutes of MODERATE intensity PLUS 2 days per week of FULL BODY resistance training-continue gym 4 days a week   Mindful eating  Goal protein intake 90g per day  8580-5558 calories  Incorporate cardio exercise  Restart 1/2 tab of Phentermine   Every meal should have protein base  Add fruit to protein shake       Phentermine has as potential side effects of increased heart rate, increased blood pressure, palpitations, headache, dizziness, insomnia, altered mood, abdominal upset, and dry mouth  Notify the provider with change in mood  ER with SI/HI  Phentermine should be stopped prior to surgery   Notify the provider with any changes in vision

## 2021-08-05 NOTE — PROGRESS NOTES
Assessment/Plan:    Obesity, Class II, BMI 35-39 9  -Patient is pursuing Conservative Program  -Initial weight loss goal of 5-10% weight loss for improved health  -had constipation and insomnia on wellbutrin  -Likely not a Topamax candidate  If a long time ago and passed easliy could consider with understanding of increased risk of stones  If recent or needing stenting not an option    -starting phentermine 1/2 tab on 8/5/21 at 211 2 lbs -never completed ekg  Will need to have done prior to any refills coming from me  Initial:202 lbs   Current: 211 2 lbs   Change:+9 2 lbs   Goal:130 lbs    Goals:  Food log (ie ) www myfitnesspal com,sparkpeople  com,loseit com,calorieking  com,etc  baritastic  No sugary beverages  At least 64oz of water daily  Increase physical activity by 10 minutes daily  Gradually increase physical activity to a goal of 5 days per week for 30 minutes of MODERATE intensity PLUS 2 days per week of FULL BODY resistance training-continue gym 4 days a week   Mindful eating  Goal protein intake 90g per day  2033-0722 calories  Incorporate cardio exercise  Restart 1/2 tab of Phentermine   Every meal should have protein base  Add fruit to protein shake       Essential hypertension  Taking hctz  -should improve with weight loss, dietary, and lifestyle changes  -continue management with prescribing provider          Follow up in approximately 1 month with Non-Surgical Physician/Advanced Practitioner  Diagnoses and all orders for this visit:    Obesity, Class II, BMI 35-39 9    Essential hypertension          Subjective:   Chief Complaint   Patient presents with    Follow-up     MWM follow up        Patient ID: Brad Gonzalez  is a 49 y o  female with excess weight/obesity here to pursue weight managment  Patient is pursuing Conservative Program      HPI  Patients notes she stress eats a lot  Declines appt with SW  Notes her daughter is leaving for college and there is some tension in her home  Notes she has gotten off track  Food logging: not logging   Fruit/Vegetable servings: 1 servings with smoothie   Exercise: goes to gym 4 time per week for 30 minutes   Hydration:1 gallon of water, 1 cup of hot tea-plain  NO ETOH  Was prescribed phentermine in may  She stopped Wellbutrin due to constipation and insomnia  But seems very confused about if she started the phentermine, if she had side effects or when the last time she took the medication  Patient will restart 1/2 of phentermine tomorrow morning with breakfast and see if it helps her  Wakes up 5-6 am  Has breakfast around 9am -smoothie with fruit and greek yogurt   1pm-2pm--popcorn and protein shake   D: grilled chicken and veggies  S: ice cream with sprinkles     Colonoscopy-Completed 03/22/2019    The following portions of the patient's history were reviewed and updated as appropriate: allergies, current medications, past family history, past medical history, past social history, past surgical history and problem list     Review of Systems   HENT: Negative for sore throat  Respiratory: Negative for cough and shortness of breath  Cardiovascular: Negative for chest pain and palpitations  Gastrointestinal: Negative for abdominal pain, constipation, diarrhea, nausea and vomiting  Denies GERD   Skin: Negative for rash  Psychiatric/Behavioral: Negative for suicidal ideas (denies HI)  Denies depression and anxiety       Objective:    /80 (BP Location: Left arm, Patient Position: Sitting, Cuff Size: Standard)   Pulse 65   Temp 97 5 °F (36 4 °C) (Tympanic)   Ht 5' 5" (1 651 m)   Wt 95 8 kg (211 lb 3 2 oz)   BMI 35 15 kg/m²      Physical Exam  Vitals and nursing note reviewed  Constitutional   General appearance: Abnormal   well developed and morbidly obese  Eyes No conjunctival pallor  Pulmonary   Respiratory effort: No increased work of breathing or signs of respiratory distress      Abdomen   Abdomen: Abnormal  The abdomen was obese      Musculoskeletal   Gait and station: Normal     Psychiatric   Orientation to person, place and time: Normal     Affect: appropriate

## 2021-08-10 ENCOUNTER — TELEPHONE (OUTPATIENT)
Dept: BARIATRICS | Facility: CLINIC | Age: 56
End: 2021-08-10

## 2021-08-10 ENCOUNTER — OFFICE VISIT (OUTPATIENT)
Dept: GASTROENTEROLOGY | Facility: CLINIC | Age: 56
End: 2021-08-10
Payer: COMMERCIAL

## 2021-08-10 VITALS
HEART RATE: 62 BPM | SYSTOLIC BLOOD PRESSURE: 130 MMHG | HEIGHT: 65 IN | DIASTOLIC BLOOD PRESSURE: 80 MMHG | WEIGHT: 211 LBS | TEMPERATURE: 95.9 F | BODY MASS INDEX: 35.16 KG/M2

## 2021-08-10 DIAGNOSIS — R14.0 BLOATING: Primary | ICD-10-CM

## 2021-08-10 PROCEDURE — 1036F TOBACCO NON-USER: CPT | Performed by: INTERNAL MEDICINE

## 2021-08-10 PROCEDURE — 3008F BODY MASS INDEX DOCD: CPT | Performed by: PHYSICIAN ASSISTANT

## 2021-08-10 PROCEDURE — 99214 OFFICE O/P EST MOD 30 MIN: CPT | Performed by: INTERNAL MEDICINE

## 2021-08-10 NOTE — TELEPHONE ENCOUNTER
Spoke to patient and discussed she never got the ekg done that doctor Juanita Lawson had placed inorder for her to do phentermine  Patient says she does not feel comfortable getting ekg done and if that means she can't continue phentermine that is fine  Wants to know what other options are available are to her    wellbutrin but doesn't want that because its an antidepressant and she doesn't have depression and had perviosu side effects   Metformin but she doesn't have diabetes so she isn't interested in that   saxenda but doesn't want daily injection     Options are metformin vs saxenda vs naltrexone       ----- Message from Bryan Hale sent at 8/6/2021  2:01 PM EDT -----  Contact: [phone]  Alida Watson requested you give her call on Monday when you return  I did let the patient know that you were not in the office today "Friday"      Thank you,  Krista Finch

## 2021-08-10 NOTE — LETTER
August 10, 2021     Referral [address] [place] [id number]    Patient: Sonja Parra   YOB: 1965   Date of Visit: 8/10/2021       Dear Dr Aly Pritchard:    Thank you for referring Sonja Parra to me for evaluation  Below are my notes for this consultation  If you have questions, please do not hesitate to call me  I look forward to following your patient along with you  Sincerely,        Gwen Valero MD        CC: Gume Tracy, Amy Ly MD  8/10/2021 11:22 AM  Sign when Signing Visit  Consultation - [facility]) [facility]  Sonja Parra 49 y o  female MRN: [id number]  Unit/Bed#:  Encounter: [id number]        Consults    ASSESSMENT/PLAN:     1  Abdominal bloating- wide differential, possibly secondary to undiagnosed celiac disease versus H pylori bacteria versus a small intestinal bacterial overgrowth vs  IBS C   -  Would recommend starting on a daily probiotic     - recommend low FODMAP diet  -  Can trial Gas-X   -  Will check celiac serologies, check stool for H pylori  -  Will hold off on endoscopic evaluation as symptoms are predominantly only of abdominal bloating and no other alarm symptoms  If patient does not notice any improvement with above regimen,  Would recommend EGD to assess for peptic ulcer disease   - patient is in agreement with the plan  2   Colon cancer screening- up to date       ______________________________________________________________________    Reason for Consult / Principal Problem: [unfilled]    HPI: Sonja Parra is a 49y o  year old female   With history of hypertension, depression, breast cancer s/p lumpectomy, radiation, chemotherapy, 15 years ago, presents for evaluation of bloating  Patient reports that she has noted increased bloating over the past 1 year  She denies any abdominal pain, nausea, vomiting, change in bowel habits    She does report occasional constipation with stool consistency of [place] stool scale 1    she is unable to identify any trigger foods that cause bloating  Denies any hematemesis, coffee-ground emesis or melena  No unintentional weight loss  Patient is up-to-date with her colonoscopy, underwent colonoscopy in 2019 which was notable for 2 hyperplastic polyps  She was recommended repeat colonoscopy at 5 year interval     No family history of GI malignancy  labs are reviewed and are notable for normal liver enzymes, normal TSH, hemoglobin 12 3, normal platelets  Review of Systems: The remainder of the review of systems was negative except for the pertinent positives noted in HPI  Historical Information   Past Medical History:   Diagnosis Date    Cancer ([facility] [address] [facility]     L breast-lumpectomy    Contact lens/glasses fitting     History of chemotherapy     History of depression     History of radiation therapy     Hypertension     Kidney stone     Malignant neoplasm of breast ([facility] [address] [facility]     Obesity (BMI 30-39  9)     Premenopausal menorrhagia 3/28/2014     Past Surgical History:   Procedure Laterality Date    BREAST LUMPECTOMY Left 2006     SECTION      x2    COLONOSCOPY      ENDOMETRIAL ABLATION      FL RETROGRADE PYELOGRAM  2020    INTRAOPERATIVE RADIATION THERAPY (IORT)      last assessed: 7/17/15    NV COLONOSCOPY FLX DX W/COLLJ SPEC WHEN PFRMD N/A 3/22/2019    Procedure: COLONOSCOPY;  Surgeon: Eliza Barajas MD;  Location: [facility];   Service: Gastroenterology    NV CYSTO/URETERO W/LITHOTRIPSY &INDWELL STENT INSRT Left 2020    Procedure: CYSTOSCOPY URETEROSCOPY WITH LITHOTRIPSY HIGH POWERED LASER, RETROGRADE PYELOGRAM, BASKET STONE EXTRACTION, AND INSERTION STENT URETERAL;  Surgeon: Kay Pond MD;  Location: [facility];  Service: Urology     Social History   Social History     Substance and Sexual Activity   Alcohol Use No    Comment: socially     Social History     Substance and Sexual Activity   Drug Use No     Social History Tobacco Use   Smoking Status Never Smoker   Smokeless Tobacco Never Used     Family History   Problem Relation Age of Onset    Hypertension Family     Hypertension Mother     Heart disease Mother         CHF-related to smoking    ADD / ADHD Daughter     Diabetes Neg Hx     Stroke Neg Hx     Thyroid disease Neg Hx        Meds/Allergies     (Not in a hospital admission)    No current facility-administered medications for this visit  Allergies   Allergen Reactions    Percocet [Oxycodone-Acetaminophen] Rash    Percolone [Oxycodone] Rash    Shellfish-Derived Products - Food Allergy Rash    Pollen Extract Cough       Objective     Blood pressure 130/80, pulse 62, temperature (!) 95 9 °F (35 5 °C), temperature source Temporal, height 5' 5" (1 651 m), weight 95 7 kg (211 lb)  [unfilled]    PHYSICAL EXAM     GEN: well nourished, well developed, no acute distress  HEENT: anicteric, MMM, no cervical or supraclavicular lymphadenopathy  CV: RRR, no m/r/g  CHEST: CTA b/l, no WRR  ABD: +BS, soft, NT/ND, no hepatosplenomegaly  EXT: no c/c/e  SKIN: no rashes,  NEURO: aaox3    Lab Results:   No visits with results within 1 Day(s) from this visit     Latest known visit with results is:   Orders Only on 12/23/2020   Component Date Value    White Blood Cell Count 12/23/2020 6 0     Red Blood Cell Count 12/23/2020 4 33     Hemoglobin 12/23/2020 12 3     HCT 12/23/2020 36 6     MCV 12/23/2020 85     MCH 12/23/2020 28 4     MCHC 12/23/2020 33 6     RDW 12/23/2020 13 0     Platelet Count 55/93/6834 182     Neutrophils 12/23/2020 61     Lymphocytes 12/23/2020 26     Monocytes 12/23/2020 10     Eosinophils 12/23/2020 2     Basophils PCT 12/23/2020 1     Neutrophils (Absolute) 12/23/2020 3 7     Lymphocytes (Absolute) 12/23/2020 1 6     Monocytes (Absolute) 12/23/2020 0 6     Eosinophils (Absolute) 12/23/2020 0 1     Basophils ABS 12/23/2020 0 0     Immature Granulocytes 12/23/2020 0     Immature Granulocytes (A* 12/23/2020 0 0     Glucose, Random 12/23/2020 90     BUN 12/23/2020 19     Creatinine 12/23/2020 0 93     eGFR Non  12/23/2020 70     eGFR  12/23/2020 81     SL AMB BUN/CREATININE RA* 12/23/2020 20     Sodium 12/23/2020 141     Potassium 12/23/2020 4 2     Chloride 12/23/2020 102     CO2 12/23/2020 26     CALCIUM 12/23/2020 9 5     Protein, Total 12/23/2020 7 4     Albumin 12/23/2020 4 2     Globulin, Total 12/23/2020 3 2     Albumin/Globulin Ratio 12/23/2020 1 3     TOTAL BILIRUBIN 12/23/2020 0 4     Alk Phos Isoenzymes 12/23/2020 79     AST 12/23/2020 27     ALT 12/23/2020 17     TSH 12/23/2020 1 420      Imaging Studies: I have personally reviewed pertinent films in PACS

## 2021-08-10 NOTE — PROGRESS NOTES
Consultation - [facility]  Michael Khan 49 y o  female MRN: [id number]  Unit/Bed#:  Encounter: [id number]        Consults    ASSESSMENT/PLAN:     1  Abdominal bloating- wide differential, possibly secondary to undiagnosed celiac disease versus H pylori bacteria versus a small intestinal bacterial overgrowth vs  IBS C   -  Would recommend starting on a daily probiotic     - recommend low FODMAP diet  -  Can trial Gas-X   -  Will check celiac serologies, check stool for H pylori  -  Will hold off on endoscopic evaluation as symptoms are predominantly only of abdominal bloating and no other alarm symptoms  If patient does not notice any improvement with above regimen,  Would recommend EGD to assess for peptic ulcer disease   - patient is in agreement with the plan  2   Colon cancer screening- up to date       ______________________________________________________________________    Reason for Consult / Principal Problem: [unfilled]    HPI: Michael Khan is a 49y o  year old female   With history of hypertension, depression, breast cancer s/p lumpectomy, radiation, chemotherapy, 15 years ago, presents for evaluation of bloating  Patient reports that she has noted increased bloating over the past 1 year  She denies any abdominal pain, nausea, vomiting, change in bowel habits  She does report occasional constipation with stool consistency of [place] stool scale 1    she is unable to identify any trigger foods that cause bloating  Denies any hematemesis, coffee-ground emesis or melena  No unintentional weight loss  Patient is up-to-date with her colonoscopy, underwent colonoscopy in 2019 which was notable for 2 hyperplastic polyps  She was recommended repeat colonoscopy at 5 year interval     No family history of GI malignancy  labs are reviewed and are notable for normal liver enzymes, normal TSH, hemoglobin 12 3, normal platelets  Review of Systems:   The remainder of the review of systems was negative except for the pertinent positives noted in HPI  Historical Information   Past Medical History:   Diagnosis Date    Cancer ([facility] [address] )     L breast-lumpectomy    Contact lens/glasses fitting     History of chemotherapy     History of depression     History of radiation therapy     Hypertension     Kidney stone     Malignant neoplasm of breast ([facility] [address] )     Obesity (BMI 30-39  9)     Premenopausal menorrhagia 3/28/2014     Past Surgical History:   Procedure Laterality Date    BREAST LUMPECTOMY Left 2006     SECTION      x2    COLONOSCOPY      ENDOMETRIAL ABLATION      FL RETROGRADE PYELOGRAM  2020    INTRAOPERATIVE RADIATION THERAPY (IORT)      last assessed: 7/17/15    [place] COLONOSCOPY FLX DX W/COLLJ SPEC WHEN PFRMD N/A 3/22/2019    Procedure: COLONOSCOPY;  Surgeon: Mikey Salgado MD;  Location: [place] [address] GI LAB; Service: Gastroenterology    [place] CYSTO/URETERO W/LITHOTRIPSY &INDWELL STENT INSRT Left 2020    Procedure: CYSTOSCOPY URETEROSCOPY WITH LITHOTRIPSY HIGH POWERED LASER, RETROGRADE PYELOGRAM, BASKET STONE EXTRACTION, AND INSERTION STENT URETERAL;  Surgeon: Israel Huddleston MD;  [facility];  Service: Urology     Social History   Social History     Substance and Sexual Activity   Alcohol Use No    Comment: socially     Social History     Substance and Sexual Activity   Drug Use No     Social History     Tobacco Use   Smoking Status Never Smoker   Smokeless Tobacco Never Used     Family History   Problem Relation Age of Onset    Hypertension Family     Hypertension Mother     Heart disease Mother         CHF-related to smoking    ADD / ADHD Daughter     Diabetes Neg Hx     Stroke Neg Hx     Thyroid disease Neg Hx        Meds/Allergies     (Not in a hospital admission)    No current facility-administered medications for this visit         Allergies   Allergen Reactions    Percocet [Oxycodone-Acetaminophen] Rash    Percolone [Oxycodone] Rash    Shellfish-Derived Products - Food Allergy Rash    Pollen Extract Cough       Objective     Blood pressure 130/80, pulse 62, temperature (!) 95 9 °F (35 5 °C), temperature source Temporal, height 5' 5" (1 651 m), weight 95 7 kg (211 lb)  [unfilled]    PHYSICAL EXAM     GEN: well nourished, well developed, no acute distress  HEENT: anicteric, MMM, no cervical or supraclavicular lymphadenopathy  CV: RRR, no m/r/g  CHEST: CTA b/l, no WRR  ABD: +BS, soft, NT/ND, no hepatosplenomegaly  EXT: no c/c/e  SKIN: no rashes,  NEURO: aaox3    Lab Results:   No visits with results within 1 Day(s) from this visit     Latest known visit with results is:   Orders Only on 12/23/2020   Component Date Value    White Blood Cell Count 12/23/2020 6 0     Red Blood Cell Count 12/23/2020 4 33     Hemoglobin 12/23/2020 12 3     HCT 12/23/2020 36 6     MCV 12/23/2020 85     MCH 12/23/2020 28 4     MCHC 12/23/2020 33 6     RDW 12/23/2020 13 0     Platelet Count 70/36/4559 182     Neutrophils 12/23/2020 61     Lymphocytes 12/23/2020 26     Monocytes 12/23/2020 10     Eosinophils 12/23/2020 2     Basophils PCT 12/23/2020 1     Neutrophils (Absolute) 12/23/2020 3 7     Lymphocytes (Absolute) 12/23/2020 1 6     Monocytes (Absolute) 12/23/2020 0 6     Eosinophils (Absolute) 12/23/2020 0 1     Basophils ABS 12/23/2020 0 0     Immature Granulocytes 12/23/2020 0     Immature Granulocytes (A* 12/23/2020 0 0     Glucose, Random 12/23/2020 90     BUN 12/23/2020 19     Creatinine 12/23/2020 0 93     eGFR Non  12/23/2020 70     eGFR  12/23/2020 81     SL AMB BUN/CREATININE RA* 12/23/2020 20     Sodium 12/23/2020 141     Potassium 12/23/2020 4 2     Chloride 12/23/2020 102     CO2 12/23/2020 26     CALCIUM 12/23/2020 9 5     Protein, Total 12/23/2020 7 4     Albumin 12/23/2020 4 2     Globulin, Total 12/23/2020 3 2     Albumin/Globulin Ratio 12/23/2020 1 3     TOTAL BILIRUBIN 12/23/2020 0 4     Alk Phos Isoenzymes 12/23/2020 79     AST 12/23/2020 27     ALT 12/23/2020 17     TSH 12/23/2020 1 420      Imaging Studies: I have personally reviewed pertinent films in PACS

## 2021-08-18 ENCOUNTER — TELEPHONE (OUTPATIENT)
Dept: FAMILY MEDICINE CLINIC | Facility: CLINIC | Age: 56
End: 2021-08-18

## 2021-08-18 DIAGNOSIS — M54.2 NECK PAIN: Primary | ICD-10-CM

## 2021-08-18 NOTE — TELEPHONE ENCOUNTER
8/18/2021 3:23 PM Called Kirstin Esposito regarding her form  She is now feeling better  Form completed and put in patient   She is aware it is ready      Message complete  Laisha Patrick DO
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
## 2021-08-19 ENCOUNTER — HOSPITAL ENCOUNTER (OUTPATIENT)
Dept: RADIOLOGY | Facility: HOSPITAL | Age: 56
Discharge: HOME | End: 2021-08-19
Payer: COMMERCIAL

## 2021-08-19 VITALS — BODY MASS INDEX: 35.16 KG/M2 | WEIGHT: 211 LBS | HEIGHT: 65 IN

## 2021-08-19 DIAGNOSIS — Z12.31 SCREENING MAMMOGRAM, ENCOUNTER FOR: ICD-10-CM

## 2021-08-19 PROCEDURE — 77067 SCR MAMMO BI INCL CAD: CPT

## 2021-08-19 PROCEDURE — 77063 BREAST TOMOSYNTHESIS BI: CPT

## 2021-08-23 NOTE — TELEPHONE ENCOUNTER
Spoke with pt   States   not having any pain   has been constipated the past 2 days  Normal bowel movement  This morning        advised to take MiraLax and drink  Lots of water  PCP for [facility]

## 2021-09-13 ENCOUNTER — TELEPHONE (OUTPATIENT)
Dept: FAMILY MEDICINE CLINIC | Facility: CLINIC | Age: 56
End: 2021-09-13

## 2021-09-13 ENCOUNTER — TELEPHONE (OUTPATIENT)
Dept: BARIATRICS | Facility: CLINIC | Age: 56
End: 2021-09-13

## 2021-09-13 DIAGNOSIS — F32.A MILD DEPRESSION: Primary | ICD-10-CM

## 2021-09-13 RX ORDER — BUPROPION HYDROCHLORIDE 150 MG/1
150 TABLET ORAL EVERY MORNING
Qty: 30 TABLET | Refills: 5 | Status: SHIPPED | OUTPATIENT
Start: 2021-09-13 | End: 2021-12-21 | Stop reason: ALTCHOICE

## 2021-09-13 NOTE — TELEPHONE ENCOUNTER
9/13/2021 2:51 PM Returned call to Socorro Wolfe     She has started a new job in October  She has been struggling with her weight  She has been going to Weight Management  She thinks that she needs a low dose anti-depressant  Wellbutrin started   Risks and benefits of medication discussed        Message complete  Kamar Molina DO

## 2021-09-14 ENCOUNTER — TELEPHONE (OUTPATIENT)
Dept: FAMILY MEDICINE CLINIC | Facility: CLINIC | Age: 56
End: 2021-09-14

## 2021-09-14 NOTE — TELEPHONE ENCOUNTER
9/14/2021 4:47 PM returned call to [facility]  She is worried about getting constipation with the Wellbutrin  I recommended that she take fiber or Miralax as needed      Message complete  Laisha Patrick, DO

## 2021-09-14 NOTE — TELEPHONE ENCOUNTER
Patient called and ONLY wants to speak to Dr Hilda Cifuentes    States its about medication    Please call patient

## 2021-10-01 ENCOUNTER — TELEPHONE (OUTPATIENT)
Dept: FAMILY MEDICINE CLINIC | Facility: CLINIC | Age: 56
End: 2021-10-01

## 2021-10-07 ENCOUNTER — TELEPHONE (OUTPATIENT)
Dept: FAMILY MEDICINE CLINIC | Facility: CLINIC | Age: 56
End: 2021-10-07

## 2021-10-07 DIAGNOSIS — E66.09 CLASS 1 OBESITY DUE TO EXCESS CALORIES WITH SERIOUS COMORBIDITY AND BODY MASS INDEX (BMI) OF 33.0 TO 33.9 IN ADULT: Primary | ICD-10-CM

## 2021-10-07 RX ORDER — PHENTERMINE HYDROCHLORIDE 15 MG/1
15 CAPSULE ORAL EVERY MORNING
Qty: 30 CAPSULE | Refills: 2 | Status: SHIPPED | OUTPATIENT
Start: 2021-10-07 | End: 2021-11-08 | Stop reason: SDUPTHER

## 2021-10-09 ENCOUNTER — IMMUNIZATIONS (OUTPATIENT)
Dept: FAMILY MEDICINE CLINIC | Facility: HOSPITAL | Age: 56
End: 2021-10-09

## 2021-10-09 DIAGNOSIS — Z23 ENCOUNTER FOR IMMUNIZATION: Primary | ICD-10-CM

## 2021-10-09 PROCEDURE — 0001A SARS-COV-2 / COVID-19 MRNA VACCINE (PFIZER-BIONTECH) 30 MCG: CPT

## 2021-10-09 PROCEDURE — 91300 SARS-COV-2 / COVID-19 MRNA VACCINE (PFIZER-BIONTECH) 30 MCG: CPT

## 2021-11-08 ENCOUNTER — TELEPHONE (OUTPATIENT)
Dept: OBGYN CLINIC | Facility: CLINIC | Age: 56
End: 2021-11-08

## 2021-11-08 DIAGNOSIS — E66.09 CLASS 1 OBESITY DUE TO EXCESS CALORIES WITH SERIOUS COMORBIDITY AND BODY MASS INDEX (BMI) OF 33.0 TO 33.9 IN ADULT: ICD-10-CM

## 2021-11-08 RX ORDER — PHENTERMINE HYDROCHLORIDE 30 MG/1
30 CAPSULE ORAL EVERY MORNING
Qty: 30 CAPSULE | Refills: 2 | Status: SHIPPED | OUTPATIENT
Start: 2021-11-08 | End: 2021-12-21 | Stop reason: ALTCHOICE

## 2021-11-29 DIAGNOSIS — N20.0 NEPHROLITHIASIS: Primary | ICD-10-CM

## 2021-11-30 ENCOUNTER — HOSPITAL ENCOUNTER (OUTPATIENT)
Dept: RADIOLOGY | Facility: HOSPITAL | Age: 56
Discharge: HOME | End: 2021-11-30
Payer: COMMERCIAL

## 2021-11-30 DIAGNOSIS — N20.0 NEPHROLITHIASIS: ICD-10-CM

## 2021-11-30 PROCEDURE — 74018 RADEX ABDOMEN 1 VIEW: CPT

## 2021-12-01 ENCOUNTER — ANNUAL EXAM (OUTPATIENT)
Dept: OBGYN CLINIC | Facility: CLINIC | Age: 56
End: 2021-12-01
Payer: COMMERCIAL

## 2021-12-01 VITALS — DIASTOLIC BLOOD PRESSURE: 90 MMHG | SYSTOLIC BLOOD PRESSURE: 136 MMHG | HEIGHT: 65 IN | BODY MASS INDEX: 35.11 KG/M2

## 2021-12-01 DIAGNOSIS — N89.8 VAGINAL DISCHARGE: ICD-10-CM

## 2021-12-01 DIAGNOSIS — Z01.419 WELL WOMAN EXAM: Primary | ICD-10-CM

## 2021-12-01 DIAGNOSIS — Z85.3 HISTORY OF LEFT BREAST CANCER: ICD-10-CM

## 2021-12-01 PROCEDURE — S0612 ANNUAL GYNECOLOGICAL EXAMINA: HCPCS | Performed by: PHYSICIAN ASSISTANT

## 2021-12-01 PROCEDURE — 87070 CULTURE OTHR SPECIMN AEROBIC: CPT | Performed by: PHYSICIAN ASSISTANT

## 2021-12-05 LAB — BACTERIA GENITAL AEROBE CULT: NORMAL

## 2021-12-06 ENCOUNTER — OFFICE VISIT (OUTPATIENT)
Dept: UROLOGY | Facility: CLINIC | Age: 56
End: 2021-12-06
Payer: COMMERCIAL

## 2021-12-06 VITALS
WEIGHT: 216 LBS | SYSTOLIC BLOOD PRESSURE: 130 MMHG | HEART RATE: 63 BPM | DIASTOLIC BLOOD PRESSURE: 76 MMHG | BODY MASS INDEX: 35.99 KG/M2 | HEIGHT: 65 IN

## 2021-12-06 DIAGNOSIS — N20.0 NEPHROLITHIASIS: Primary | ICD-10-CM

## 2021-12-06 PROCEDURE — 3008F BODY MASS INDEX DOCD: CPT | Performed by: FAMILY MEDICINE

## 2021-12-06 PROCEDURE — 99213 OFFICE O/P EST LOW 20 MIN: CPT | Performed by: PHYSICIAN ASSISTANT

## 2021-12-08 ENCOUNTER — TELEPHONE (OUTPATIENT)
Dept: FAMILY MEDICINE CLINIC | Facility: CLINIC | Age: 56
End: 2021-12-08

## 2021-12-12 DIAGNOSIS — I10 ESSENTIAL HYPERTENSION: ICD-10-CM

## 2021-12-13 RX ORDER — HYDROCHLOROTHIAZIDE 12.5 MG/1
TABLET ORAL
Qty: 90 TABLET | Refills: 1 | Status: SHIPPED | OUTPATIENT
Start: 2021-12-13 | End: 2022-06-20

## 2021-12-20 ENCOUNTER — TELEPHONE (OUTPATIENT)
Dept: FAMILY MEDICINE CLINIC | Facility: CLINIC | Age: 56
End: 2021-12-20

## 2021-12-21 ENCOUNTER — OFFICE VISIT (OUTPATIENT)
Dept: FAMILY MEDICINE CLINIC | Facility: CLINIC | Age: 56
End: 2021-12-21
Payer: COMMERCIAL

## 2021-12-21 VITALS
DIASTOLIC BLOOD PRESSURE: 84 MMHG | TEMPERATURE: 99 F | SYSTOLIC BLOOD PRESSURE: 146 MMHG | RESPIRATION RATE: 18 BRPM | HEART RATE: 76 BPM | BODY MASS INDEX: 35.94 KG/M2 | HEIGHT: 65 IN

## 2021-12-21 DIAGNOSIS — I10 ESSENTIAL HYPERTENSION: Primary | ICD-10-CM

## 2021-12-21 DIAGNOSIS — F41.9 ANXIETY: ICD-10-CM

## 2021-12-21 PROCEDURE — 99213 OFFICE O/P EST LOW 20 MIN: CPT | Performed by: FAMILY MEDICINE

## 2021-12-21 PROCEDURE — 3725F SCREEN DEPRESSION PERFORMED: CPT | Performed by: FAMILY MEDICINE

## 2021-12-21 PROCEDURE — 3079F DIAST BP 80-89 MM HG: CPT | Performed by: FAMILY MEDICINE

## 2021-12-21 PROCEDURE — 1036F TOBACCO NON-USER: CPT | Performed by: FAMILY MEDICINE

## 2021-12-21 PROCEDURE — 3077F SYST BP >= 140 MM HG: CPT | Performed by: FAMILY MEDICINE

## 2021-12-27 ENCOUNTER — TELEPHONE (OUTPATIENT)
Dept: FAMILY MEDICINE CLINIC | Facility: CLINIC | Age: 56
End: 2021-12-27

## 2022-01-06 DIAGNOSIS — E66.09 CLASS 1 OBESITY DUE TO EXCESS CALORIES WITH SERIOUS COMORBIDITY AND BODY MASS INDEX (BMI) OF 33.0 TO 33.9 IN ADULT: Primary | ICD-10-CM

## 2022-01-06 RX ORDER — PHENTERMINE HYDROCHLORIDE 15 MG/1
15 CAPSULE ORAL EVERY MORNING
Qty: 30 CAPSULE | Refills: 1 | Status: SHIPPED | OUTPATIENT
Start: 2022-01-06

## 2022-02-14 ENCOUNTER — TELEPHONE (OUTPATIENT)
Dept: FAMILY MEDICINE CLINIC | Facility: CLINIC | Age: 57
End: 2022-02-14

## 2022-02-14 NOTE — ASSESSMENT & PLAN NOTE
-on HCTZ  -blood pressure normotensive  -likely to improve with weight loss WORKER'S COMPENSATION FOLLOW-UP NOTE    Date of Injury:  1/26/2022  Company:  [employer]    HPI:  Wei Christianson is a 66 year old male who presents for follow up of a work related injury involving the right knee. Reports 10% improvement since initial visit. Current pain rated at 2/10 at rest and 4/10 with activity.  Patient reports that he has noticed some slow improvement of his symptoms but continues to have pain when the medial portion of his knee with weight-bearing.  MRI of the right knee was reviewed with the following impression:  IMPRESSION:   1. Degenerative signal in the medial meniscus without discrete tear.  2. 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  No fracture or high-grade stress reaction.  3. Small knee joint effusion.  4. Cruciate ligaments, collateral ligaments and lateral meniscus are  intact.  5. 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  6. Moderate chondromalacia of the medial tibiofemoral compartment.    The last progress note was reviewed.    ROS:  Relevant findings are included in the HPI.    Medications, allergies, and past medical history were reviewed in the electronic medical record.     Visit Vitals  /78   Pulse (!) 107   Ht 5' 10\" (1.778 m)   Wt 73 kg (161 lb)   BMI 23.10 kg/m²       Physical exam: Wei is a well developed male, who appears in no acute distress.    He is awake, alert, cooperative and pleasant. Vitals reviewed.   SKIN:  Normal coloration without erythema warmth or ecchymosis  MUSCULOSKELETAL:  right knee demonstrates minimal effusion with no obvious deformity.  Patient has pain to palpation over the medial joint line and anteriorly.   he is able to do a straight leg raise today without pain.   range of motion today is from 0-110 degrees.  Mild pain with Primitivo's medially.  Right hip demonstrates minimal pain to palpation over the  greater trochanteric region.  Reports only minimal pain with range of motion.  With his knee in full extension I am able to roll internal and externally without any pain.  And is able to flex, extend, abduct and adduct against resistance with without any pain.  NEURO:  Neurovascularly intact distally    Assessment:    1. Acute pain of right knee    2. Strain of right knee, subsequent encounter        Plan:   At this point we reviewed the MRI scan have discussed the case with Dr. Tristan Wise in Orthopedics.  Findings on the MRI with respect to the bone infarcts will be discussed further with Dr. Mayo patient's PCP about a possible x-ray in 6 months because I do not think this is related to current injury.  Patient will continue with the cane and Mobic.  He will start physical therapy to work on some strengthening and stretching.    RESTRICTIONS: Sit-down only work.  Okay to get up for breaks or bathroom but needs to use the cane.      See Return to Work Report for further information. Worker compensation notes are countersigned within an acceptable time frame by Dr Hayden Matson MD, my supervising physician.

## 2022-02-14 NOTE — TELEPHONE ENCOUNTER
2/14/2022 4:38 PM [facility] and Muc[facility]x recommended for the head congestion       Message complete  Albert Preston DO

## 2022-02-14 NOTE — TELEPHONE ENCOUNTER
Patient had to go to [place] for a family emergency  She lost her sister in law  She has been having sinus pressure and headaches at night and would like a recommendation on what she can  otc to help  She took some Tylenol which she states helped during the day, but the headaches get bad at night  Coming back on Wednesday  Please advise    Libby Sarabia MA

## 2022-04-20 ENCOUNTER — TELEPHONE (OUTPATIENT)
Dept: FAMILY MEDICINE CLINIC | Facility: CLINIC | Age: 57
End: 2022-04-20

## 2022-04-20 NOTE — TELEPHONE ENCOUNTER
Pt states her allergies are acting up and is asking for a recommendation of something over the counter that she can take and is asking for something non drowsy  Please call pt back

## 2022-04-20 NOTE — TELEPHONE ENCOUNTER
She should take either Claritin or Zyrtec - regular versions  She should not take the Claritin D or Zyrtec D since those will raise her blood pressure    Thank you,  Peyton Tate, DO

## 2022-06-19 DIAGNOSIS — I10 ESSENTIAL HYPERTENSION: ICD-10-CM

## 2022-06-20 RX ORDER — HYDROCHLOROTHIAZIDE 12.5 MG/1
TABLET ORAL
Qty: 90 TABLET | Refills: 0 | Status: SHIPPED | OUTPATIENT
Start: 2022-06-20

## 2022-07-02 ENCOUNTER — TELEPHONE (OUTPATIENT)
Dept: FAMILY MEDICINE CLINIC | Facility: CLINIC | Age: 57
End: 2022-07-02

## 2022-07-06 ENCOUNTER — PATIENT MESSAGE (OUTPATIENT)
Dept: FAMILY MEDICINE CLINIC | Facility: CLINIC | Age: 57
End: 2022-07-06

## 2022-07-07 ENCOUNTER — TELEMEDICINE (OUTPATIENT)
Dept: FAMILY MEDICINE CLINIC | Facility: CLINIC | Age: 57
End: 2022-07-07
Payer: COMMERCIAL

## 2022-07-07 DIAGNOSIS — U07.1 COVID-19: Primary | ICD-10-CM

## 2022-07-07 LAB — SARS-COV-2 AG UPPER RESP QL IA: ABNORMAL

## 2022-07-07 PROCEDURE — 99441 PR PHYS/QHP TELEPHONE EVALUATION 5-10 MIN: CPT | Performed by: FAMILY MEDICINE

## 2022-07-07 NOTE — TELEPHONE ENCOUNTER
From: Estrella Paniagua  To: Stacie Pacheco DO  Sent: 7/6/2022 9:31 PM EDT  Subject: My Covid-19 Test - Positive     Hi Dr Marily Raza,     Please see the picture of my My Covid-19 Test and I tested Positive   Please call me tonight or tomorrow morning at [phone]      Thank you,     Drea Rock

## 2022-07-07 NOTE — PROGRESS NOTES
COVID-19 Outpatient Progress Note    Assessment/Plan:    Problem List Items Addressed This Visit    None     Visit Diagnoses     COVID-19    -  Primary         Disposition:     Patient has COVID-19 infection  Based off CDC guidelines, they were recommended to isolate for 5 days from the date of the positive test  If they remain asymptomatic, isolation may be ended followed by 5 days of wearing a mask when around othes to minimize risk of infecting others  If they have a fever, continue to stay home until fever resolves for at least 24 hours  Discussed starting Paxlovid  She declined at this time  She has a mild case and doesn't want to risk of new medication  I have spent 7 minutes directly with the patient  Encounter provider Samra Sal DO    Provider located at [place] [address]  [address] [address]    Recent Visits  Date Type Provider Dept   07/02/22 Telephone Samra Sal [address] recent visits within past 7 days and meeting all other requirements  Today's Visits  Date Type Provider Dept   07/07/22 [facility], [address] today's visits and meeting all other requirements  Future Appointments  No visits were found meeting these conditions  Showing future appointments within next 150 days and meeting all other requirements     This virtual check-in was done via telephone and she agrees to proceed  Patient agrees to participate in a virtual check in via telephone or video visit instead of presenting to the office to address urgent/immediate medical needs  Patient is aware this is a billable service  After connecting through Telephone, the patient was identified by name and date of birth  Deangelo Rebollar was informed that this was a telemedicine visit and that the exam was being conducted confidentially over secure lines  My office door was closed  No one else was in the room   [facility] Lizette Toth acknowledged consent and understanding of privacy and security of the telemedicine visit  I informed the patient that I have reviewed her record in Epic and presented the opportunity for her to ask any questions regarding the visit today  The patient agreed to participate  It was my intent to perform this visit via video technology but the patient was not able to do a video connection so the visit was completed via audio telephone only  Verification of patient location:  Patient is located in the following state in which I hold an active license: [place]    Subjective:   Sandy Perez is a 56 y o  female who has been screened for COVID-19  Patient's symptoms include fatigue, nasal congestion and rhinorrhea  Patient denies anosmia      - Date of symptom onset: 7/3/2022  - Date of positive COVID-19 test: 2022  Type of test: Home antigen  Home antigen result verified by provider  Will get picture of test uploaded/scanned into patient's chart  COVID-19 vaccination status: Fully vaccinated with booster    She went away to [facility] [place] her [place]  She started feeling tired on  she thought it was jet lag  Then on the  she had a runny nose  Her test came back positive last night    She has been isolating  Lab Results   Component Value Date    SARSCOV2 Not Detected 2020    [facility] Positive (Patient Reported) (A) 2022     Past Medical History:   Diagnosis Date    Cancer ([facility] [address] )     L breast-lumpectomy    Contact lens/glasses fitting     History of chemotherapy     History of depression     History of radiation therapy     Hypertension     Kidney stone     Malignant neoplasm of breast ([facility] [address] ) 2006    left    Obesity (BMI 30-39  9)     Premenopausal menorrhagia 3/28/2014     Past Surgical History:   Procedure Laterality Date    BREAST BIOPSY      BREAST LUMPECTOMY Left 2006     SECTION      x2    COLONOSCOPY      ENDOMETRIAL ABLATION      FL RETROGRADE PYELOGRAM  7/22/2020    INTRAOPERATIVE RADIATION THERAPY ([facility])      last assessed: 7/17/15    CO COLONOSCOPY FLX DX W/COLLJ SPEC WHEN PFRMD N/A 3/22/2019    Procedure: COLONOSCOPY;  Surgeon: Tati Weaver MD;  Location: [facility] GI LAB; Service: Gastroenterology    CO CYSTO/URETERO W/LITHOTRIPSY &INDWELL STENT INSRT Left 7/22/2020    Procedure: CYSTOSCOPY URETEROSCOPY WITH LITHOTRIPSY HIGH POWERED LASER, RETROGRADE PYELOGRAM, BASKET STONE EXTRACTION, AND INSERTION STENT URETERAL;  Surgeon: Gladys Martinez MD;  Location: [facility] MAIN OR;  Service: Urology     Current Outpatient Medications   Medication Sig Dispense Refill    hydrochlorothiazide (HYDRODIURIL) 12 5 mg tablet TAKE 1 TABLET BY MOUTH EVERY DAY 90 tablet 0    acetaminophen (TYLENOL) 325 mg tablet Take 650 mg by mouth every 6 (six) hours as needed for mild pain      celecoxib (CeleBREX) 200 mg capsule Take 1 capsule (200 mg total) by mouth 2 (two) times a day (Patient taking differently: Take 200 mg by mouth as needed  ) 20 capsule 0    Multiple Vitamins-Minerals (HAIR SKIN AND NAILS FORMULA PO) Take 1 capsule by mouth daily after breakfast       phentermine 15 MG capsule Take 1 capsule (15 mg total) by mouth every morning 30 capsule 1    Probiotic Product (PROBIOTIC PO) Take by mouth       No current facility-administered medications for this visit  Allergies   Allergen Reactions    Percocet [Oxycodone-Acetaminophen] Rash    Percolone [Oxycodone] Rash    Shellfish-Derived Products - Food Allergy Rash    Pollen Extract Cough       Review of Systems   Constitutional: Positive for fatigue  HENT: Positive for congestion and rhinorrhea  Objective:      VIRTUAL VISIT DISCLAIMER    Jim Antoine verbally agrees to participate in [employer]   Pt is aware that [employer] could be limited without vital signs or the ability to perform a full hands-on physical Seun Low understands she or the provider may request at any time to terminate the video visit and request the patient to seek care or treatment in person

## 2022-07-08 ENCOUNTER — TELEPHONE (OUTPATIENT)
Dept: FAMILY MEDICINE CLINIC | Facility: CLINIC | Age: 57
End: 2022-07-08

## 2022-07-08 NOTE — TELEPHONE ENCOUNTER
----- Message from Allyn Anderson MA sent at 7/7/2022  2:57 PM EDT -----  Regarding: FW: My Right Ear Noni     ----- Message -----  From: Sandy Perez  Sent: 7/7/2022   2:54 PM EDT  To: THE [facility]  Subject: My Right Ear Noni                                [facility],   Yes, I called and I was told by Breanna at the [facility] that Dr Nicholas Houston was gone for the day  She then told me to send a message, she, Dr Nicholas Houston  would check her messages and back with me  Breanna told me that would be the better route for me instead of leaving message with her  I still would please like for Dr Nicholas Houston to give me call ASAP today about my right ear noni      Again thank you,     Sandy Perez

## 2022-07-08 NOTE — TELEPHONE ENCOUNTER
7/8/2022 9:36 AM returned call to [facility]  She was worried that she needed to go to the ER for her that split while she was in [facility]  Her friend who is a pediatrician checked her ear out for her  The ENT won't see her until she is 10 days out from infection  She is going to try to send me pictures  Her COVID symptoms are improving  She will schedule a CPE in the fall      Message complete  Joseph Cordon, DO

## 2022-07-09 ENCOUNTER — TELEPHONE (OUTPATIENT)
Dept: FAMILY MEDICINE CLINIC | Facility: CLINIC | Age: 57
End: 2022-07-09

## 2022-07-09 NOTE — TELEPHONE ENCOUNTER
Her  has tested positive for COVID and they are sharing the same room  She is feeling better  She wanted to know when she should come back to the office  I recommended that she schedule a CPE in the fall      Message complete  Zamzam Britton, DO

## 2022-07-13 ENCOUNTER — TELEPHONE (OUTPATIENT)
Dept: FAMILY MEDICINE CLINIC | Facility: CLINIC | Age: 57
End: 2022-07-13

## 2022-07-13 NOTE — TELEPHONE ENCOUNTER
Called patient to get more information  She stated that she has some questions about her having covid and would only like to speak to Dr Anna Mejia directly about this  I informed her that she is seeing patients, and will call when she gets a chance    Sharri Gonzales LPN

## 2022-07-13 NOTE — TELEPHONE ENCOUNTER
7/13/2022 11:42 AM Returned call to Serge Allison    She wanted to let me know that she is still testing positive for COVID    I explained to her that she can still have positive test even after getting over COVID for weeks    I recommended that she quarantine for the 10 days before going back to [facility]    I also asked her to stop testing    Message complete  Tommy Aldana, DO

## 2022-07-13 NOTE — TELEPHONE ENCOUNTER
----- Message from Maribeth Mitchell sent at 7/13/2022  9:52 AM EDT -----  Regarding: My Covid-19 Test - Positive   Hi Dr Robert Avila,     Per the  voicemail message I left you at the Office, please give me call regarding my Covid status  My phone number is [phone]      Thank you,     Maribeth Mitchell

## 2022-07-22 ENCOUNTER — TELEPHONE (OUTPATIENT)
Dept: FAMILY MEDICINE CLINIC | Facility: CLINIC | Age: 57
End: 2022-07-22

## 2022-07-22 DIAGNOSIS — Z12.31 SCREENING MAMMOGRAM, ENCOUNTER FOR: Primary | ICD-10-CM

## 2022-07-22 NOTE — TELEPHONE ENCOUNTER
----- Message from Joseph Alberto sent at 7/22/2022 11:33 AM EDT -----  Regarding: My Annual Mammogram - 2022    Hi Dr Flory Gutierrez,     Please see the below message/letter I received regarding my My Annual Mammogram     Please let me know when you have written up the Subscription and I will pick it up  Thank you,     [id number]      July 22, 2022       MRN: [id number]     Dear Ms Ander Raya,     Based on your breast imaging exam performed on August 19, 2021, it is time to schedule your next screening breast imaging exam     Please contact your physician's office to obtain a prescription or referral for this exam       [facility]  Scheduling department can assist you in making your next appointment by calling [phone]  Thank you for choosing [address] for your imaging needs      Sincerely,    [address] [facility]

## 2022-08-02 ENCOUNTER — TELEPHONE (OUTPATIENT)
Dept: FAMILY MEDICINE CLINIC | Facility: CLINIC | Age: 57
End: 2022-08-02

## 2022-08-02 DIAGNOSIS — M62.838 MUSCLE SPASM: Primary | ICD-10-CM

## 2022-08-02 RX ORDER — CYCLOBENZAPRINE HCL 5 MG
5 TABLET ORAL
Qty: 20 TABLET | Refills: 1 | Status: SHIPPED | OUTPATIENT
Start: 2022-08-02

## 2022-08-02 NOTE — TELEPHONE ENCOUNTER
She would like a refill of flexeril for muscle spasms in her neck    Refill done    Message complete  Mireya Hartley, DO

## 2022-08-23 ENCOUNTER — HOSPITAL ENCOUNTER (OUTPATIENT)
Dept: MAMMOGRAPHY | Facility: HOSPITAL | Age: 57
Discharge: HOME | End: 2022-08-23
Payer: COMMERCIAL

## 2022-08-23 VITALS — WEIGHT: 216.05 LBS | BODY MASS INDEX: 36 KG/M2 | HEIGHT: 65 IN

## 2022-08-23 DIAGNOSIS — Z12.31 SCREENING MAMMOGRAM, ENCOUNTER FOR: ICD-10-CM

## 2022-08-23 PROCEDURE — 77067 SCR MAMMO BI INCL CAD: CPT

## 2022-08-23 PROCEDURE — 77063 BREAST TOMOSYNTHESIS BI: CPT

## 2022-10-02 DIAGNOSIS — I10 ESSENTIAL HYPERTENSION: ICD-10-CM

## 2022-10-03 RX ORDER — HYDROCHLOROTHIAZIDE 12.5 MG/1
TABLET ORAL
Qty: 90 TABLET | Refills: 1 | Status: SHIPPED | OUTPATIENT
Start: 2022-10-03

## 2022-10-27 ENCOUNTER — TELEPHONE (OUTPATIENT)
Dept: FAMILY MEDICINE CLINIC | Facility: CLINIC | Age: 57
End: 2022-10-27

## 2022-10-27 DIAGNOSIS — E66.09 CLASS 1 OBESITY DUE TO EXCESS CALORIES WITH SERIOUS COMORBIDITY AND BODY MASS INDEX (BMI) OF 33.0 TO 33.9 IN ADULT: ICD-10-CM

## 2022-10-27 NOTE — TELEPHONE ENCOUNTER
Called patient to get more information  Patient insisted that she talk to Dr Don Blackman directly about her prescription question      Shannon Marshall

## 2022-10-28 RX ORDER — PHENTERMINE HYDROCHLORIDE 15 MG/1
15 CAPSULE ORAL EVERY MORNING
Qty: 30 CAPSULE | Refills: 2 | Status: SHIPPED | OUTPATIENT
Start: 2022-10-28

## 2022-10-28 NOTE — TELEPHONE ENCOUNTER
10/28/2022 9:11 AM spoke with Kaylene Blanca     Her  has lost his job and she started a new job and is waiting for her insurance to kick in  She wanted to ask if she could reschedule her physical      I let her know it was okay to reschedule a few month out    Will do a few months of phentermine as well  [place] prescription monitoring program report reviewed and is appropriate           Message complete  Tanisha Alvarez , DO

## 2022-10-28 NOTE — TELEPHONE ENCOUNTER
10/28/2022 7:55 AM returned call to [facility]   Left message on her voicemail to call the office     Td Peng DO

## 2022-12-13 NOTE — TELEPHONE ENCOUNTER
2/27/2019 12:02 PM returned call to patient  She continues to have high blood pressure readings  She had a high reading at the urgent care as well as the specialist's office  She is ready to start medication  We also discussed cortisol level testing  Explained that this is not something that we normally do  She is interested in that getting this tested she will need to follow up with a endocrinologist     Hydrochlorothiazide 12 5 mg started to help with her blood pressure  Risks and benefits of medication discussed  She is any muscle cramps she will let me know  She will follow up in the office on April 1st as scheduled      Message complete  Dr Lorin Schwab
Called patient she is concerned of her blood pressure and asked if there were any cortisol testing in her labs to which I replied no there is not  She stated she will like to speak to you   Please call at 12pm   Chaparro Colin
DR SAAVEDRA    Patient wants to speak to Dr Valeria Colon 
You can access the FollowMyHealth Patient Portal offered by [facility] by registering at the following website: [email or web address]. By joining [facility]’s FollowMyHealth portal, you will also be able to view your health information using other applications (apps) compatible with our system.

## 2023-01-19 DIAGNOSIS — I10 ESSENTIAL HYPERTENSION: ICD-10-CM

## 2023-01-19 RX ORDER — HYDROCHLOROTHIAZIDE 12.5 MG/1
TABLET ORAL
Qty: 90 TABLET | Refills: 1 | Status: SHIPPED | OUTPATIENT
Start: 2023-01-19 | End: 2023-01-23 | Stop reason: SDUPTHER

## 2023-01-23 ENCOUNTER — OFFICE VISIT (OUTPATIENT)
Dept: FAMILY MEDICINE CLINIC | Facility: CLINIC | Age: 58
End: 2023-01-23

## 2023-01-23 VITALS
HEART RATE: 90 BPM | TEMPERATURE: 97.6 F | RESPIRATION RATE: 16 BRPM | SYSTOLIC BLOOD PRESSURE: 144 MMHG | DIASTOLIC BLOOD PRESSURE: 80 MMHG | OXYGEN SATURATION: 98 %

## 2023-01-23 DIAGNOSIS — I10 ESSENTIAL HYPERTENSION: Primary | ICD-10-CM

## 2023-01-23 DIAGNOSIS — Z11.59 NEED FOR HEPATITIS C SCREENING TEST: ICD-10-CM

## 2023-01-23 PROBLEM — Z12.11 ENCOUNTER FOR SCREENING COLONOSCOPY: Status: RESOLVED | Noted: 2019-02-01 | Resolved: 2023-01-23

## 2023-01-23 RX ORDER — HYDROCHLOROTHIAZIDE 25 MG/1
25 TABLET ORAL DAILY
Qty: 90 TABLET | Refills: 1 | Status: SHIPPED | OUTPATIENT
Start: 2023-01-23

## 2023-01-23 NOTE — PROGRESS NOTES
Name: Eda Heath      : 1965      MRN: [id number]  Encounter Provider: Lars Vasquez DO  Encounter Date: 2023   Encounter department: [address] [place] [address]     1  Essential hypertension  Assessment & Plan:  Blood pressure not at goal  Dose of Hydrochlorothiazide increased from 12 5 to 25 mg a day  Advised to stop phentermine    Orders:  -     hydrochlorothiazide (HYDRODIURIL) 25 mg tablet; Take 1 tablet (25 mg total) by mouth daily  -     CBC; Future  -     Comprehensive metabolic panel; Future  -     Lipid Panel with Direct LDL reflex; Future  -     CBC  -     Comprehensive metabolic panel  -     Lipid Panel with Direct LDL reflex    2  Need for hepatitis C screening test  -     Hepatitis C Antibody ([facility]); Future  -     Hepatitis C Antibody ([facility])        Return in about 6 weeks (around 3/6/2023) for Annual physical       Subjective      She started with a headache last night  Her head was throbbing  She took some tylenol  She has also been layed off from her job and is looking for work       Review of Systems    Current Outpatient Medications on File Prior to Visit   Medication Sig   • acetaminophen (TYLENOL) 325 mg tablet Take 650 mg by mouth every 6 (six) hours as needed for mild pain   • celecoxib (CeleBREX) 200 mg capsule Take 1 capsule (200 mg total) by mouth 2 (two) times a day (Patient taking differently: Take 200 mg by mouth as needed)   • cyclobenzaprine (FLEXERIL) 5 mg tablet Take 1 tablet (5 mg total) by mouth daily at bedtime as needed for muscle spasms   • Multiple Vitamins-Minerals (HAIR SKIN AND NAILS FORMULA PO) Take 1 capsule by mouth daily after breakfast    • Probiotic Product (PROBIOTIC PO) Take by mouth   • [DISCONTINUED] hydrochlorothiazide (HYDRODIURIL) 12 5 mg tablet TAKE 1 TABLET BY MOUTH EVERY DAY   • [DISCONTINUED] phentermine 15 MG capsule Take 1 capsule (15 mg total) by mouth every morning       Objective     /80 Pulse 90   Temp 97 6 °F (36 4 °C)   Resp 16   SpO2 98%     Physical Exam  Vitals and nursing note reviewed  Constitutional:       Appearance: She is well-developed  HENT:      Head: Normocephalic and atraumatic  Right Ear: Tympanic membrane and external ear normal       Left Ear: Tympanic membrane and external ear normal    Cardiovascular:      Rate and Rhythm: Normal rate and regular rhythm  Heart sounds: Normal heart sounds  No murmur heard  No friction rub  Pulmonary:      Effort: Pulmonary effort is normal  No respiratory distress  Breath sounds: Normal breath sounds  No wheezing or rales  Musculoskeletal:      Right lower leg: No edema  Left lower leg: No edema         Giuseppe Armas,

## 2023-01-23 NOTE — ASSESSMENT & PLAN NOTE
Blood pressure not at goal  Dose of Hydrochlorothiazide increased from 12 5 to 25 mg a day  Advised to stop phentermine

## 2023-01-23 NOTE — PATIENT INSTRUCTIONS
DASH Eating Plan   AMBULATORY CARE:   The DASH (Dietary Approaches to Stop Hypertension) Eating Plan  is designed to help prevent or lower high blood pressure  It can also help to lower LDL (bad) cholesterol and decrease your risk for heart disease  The plan is low in sodium, sugar, unhealthy fats, and total fat  It is high in potassium, calcium, magnesium, and fiber  These nutrients are added when you eat more fruits, vegetables, and whole grains  With the DASH eating plan, you need to eat a certain number of servings from each food group  This will help you get enough of certain nutrients and limit others  The amount of servings you should eat depends on how many calories you need  Your dietitian can help you create meal plans with the right number of servings for each food group  What you need to know about sodium:  Your dietitian will tell you how much sodium is safe for you to have each day  People with high blood pressure should have no more than 1,500 to 2,300 mg of sodium in a day  A teaspoon (tsp) of salt has 2,300 mg of sodium  This may seem like a difficult goal, but small changes to the foods you eat can make a big difference  Your healthcare provider or dietitian can help you create a meal plan that follows your sodium limit  Read food labels  Food labels can help you choose foods that are low in sodium  The amount of sodium is listed in milligrams (mg)  The % Daily Value (DV) column tells you how much of your daily needs are met by 1 serving of the food for each nutrient listed  Choose foods that have less than 5% of the DV of sodium  These foods are considered low in sodium  Foods that have 20% or more of the DV of sodium are considered high in sodium  Avoid foods that have more than 300 mg of sodium in each serving  Choose foods that say low-sodium, reduced-sodium, or no salt added on the food label  Limit added salt  Do not salt food at the table if you add salt when you cook   Use herbs and spices, such as onions, garlic, and salt-free seasonings to add flavor  Try lemon or lime juice or vinegar to add a tart flavor  Use hot peppers or a small amount of hot pepper sauce to add a spicy flavor  Limit foods high in added salt, such as the following:    Seasonings made with salt, such as garlic salt, celery salt, onion salt, seasoned salt, meat tenderizers, and monosodium glutamate (MSG)    Miso soup and canned or dried soup mixes    Regular soy sauce, barbecue sauce, teriyaki sauce, steak sauce, Worcestershire sauce, and most flavored vinegars    Snack foods, such as salted chips, popcorn, pretzels, pork rinds, salted crackers, and salted nuts    Frozen foods, such as dinners, entrees, vegetables with sauces, and breaded meats    Ask about salt substitutes  Ask your healthcare provider if you may use salt substitutes  Some salt substitutes have ingredients that can be harmful if you have certain health conditions  Choose foods carefully at restaurants  Meals from restaurants, especially fast food restaurants, are often high in sodium  Some restaurants have nutrition information that tells you the amount of sodium in their foods  Ask to have your food prepared with less, or no salt  What you need to know about fats:  Healthy fats include unsaturated fats and omega-3 fatty acids  Unhealthy fats include saturated fats and trans fats    Include healthy fats, such as the following:      Cooking oils, such as soybean, canola, olive, or sunflower    Fatty fish, such as salmon, tuna, mackerel, or sardines    Flaxseed oil or ground flaxseed    ½ cup of cooked beans, such as black beans, kidney beans, or melvin beans    1½ ounces of low-sodium nuts, such as almonds or walnuts    Low-sugar, low-sodium peanut butter    Seeds such as reba seeds or sunflower seeds       Limit or do not have unhealthy fats, such as the following:      Foods that contain fat from animals, such as fatty meats, whole milk, butter, and cream    Shortening, stick margarine, palm oil, and coconut oil    Full-fat or creamy salad dressing    Creamy soup    Crackers, chips, and baked goods made with margarine or shortening    Foods that are fried in unhealthy fats    Gravy and sauces, such as Leeroy or cheese sauces    What you need to know about carbohydrates (carbs): All carbs break down into sugar  Complex carbs contain more fiber than simple carbs  This means complex carbs go into the bloodstream more slowly and cause less of a blood sugar spike  Try to include more complex carbs and fewer simple carbs  Include complex carbs, such as the followin slice of whole-grain bread    1 ounce of dry cereal that does not contain added sugar    ½ cup of cooked oatmeal    2 ounces of cooked whole-grain pasta    ½ cup of cooked brown rice    Limit or do not have simple carbs, such as the following:      [employer], such as doughnuts, pastries, and cookies    Mixes for cornbread and biscuits    White rice and pasta mixes, such as boxed macaroni and cheese    Instant and cold cereals that contain sugar    Jelly, jam, and ice cream that contain sugar    Condiments such as ketchup    Drinks high in sugar, such as soft drinks, lemonade, and fruit juice    What you need to know about vegetables and fruits:  Vegetables and fruits can be fresh, frozen, or canned  If possible, try to choose low-sodium canned options    Include a variety of vegetables and fruits, such as the followin medium apple, pear, or peach (about ½ cup chopped)    ½ small banana    ½ cup berries, such as blueberries, strawberries, or blackberries    1 cup of raw leafy greens, such as lettuce, spinach, kale, or jasiel greens    ½ cup of frozen or canned (no added salt) vegetables, such as green beans    ½ cup of fresh, frozen, or canned fruit (canned in light syrup or fruit juice)    ½ cup of vegetable or fruit juice    Limit or do not have vegetables and fruits made in the following ways:      Frozen fruit such as cherries that have added sugar    Fruit in cream or butter sauce    Canned vegetables that are high in sodium    Sauerkraut, pickled vegetables, and other foods prepared in brine    Fried vegetables or vegetables in butter or high-fat sauces    What you need to know about protein foods: Include lean or low-fat protein foods, such as the following:      Poultry (chicken, turkey) with no skin    Fish (especially fatty fish, such as salmon, fresh tuna, or mackerel)    Lean beef and pork (loin, round, extra lean hamburger)    Egg whites and egg substitutes    1 cup of nonfat (skim) or 1% milk    1½ ounces of fat-free or low-fat cheese    6 ounces of nonfat or low-fat yogurt    Limit or do not have high-fat protein foods, such as the following:      Smoked or cured meat, such as corned beef, anuglo, ham, hot dogs, and sausage    Canned beans and canned meats or spreads, such as potted meats, sardines, anchovies, and imitation seafood    Deli or lunch meats, such as bologna, ham, turkey, and roast beef    High-fat meat (T-bone steak, regular hamburger, and ribs)    Whole eggs and egg yolks    Whole milk, 2% milk, and cream    Regular cheese and processed cheese    Other guidelines to follow:   Maintain a healthy weight  Your risk for heart disease is higher if you are overweight  Your healthcare provider may suggest that you lose weight if you are overweight  You can lose weight by eating fewer calories and foods that have added sugars and fat  The DASH meal plan can help you do this  Decrease calories by eating smaller portions at each meal and fewer snacks  Ask your healthcare provider for more information about how to lose weight  Exercise regularly  Regular exercise can help you reach or maintain a healthy weight  Regular exercise can also help decrease your blood pressure and improve your cholesterol levels   Get 30 minutes or more of moderate exercise each day of the week  To lose weight, get at least 60 minutes of exercise  Talk to your healthcare provider about the best exercise program for you  Limit alcohol  Women should limit alcohol to 1 drink a day  Men should limit alcohol to 2 drinks a day  A drink of alcohol is 12 ounces of beer, 5 ounces of wine, or 1½ ounces of liquor  For more information:   [facility] [address]  [address] [address]  [place] [address]  Phone: 0- 064 - [phone]  Web Address: [facility] no    © [facility] 2022 Information is for End User's use only and may not be sold, redistributed or otherwise used for commercial purposes  All illustrations and images included in CareNotes® are the copyrighted property of A D A M , Inc  or [facility] Timmy Caraballo   The above information is an  only  It is not intended as medical advice for individual conditions or treatments  Talk to your doctor, nurse or pharmacist before following any medical regimen to see if it is safe and effective for you

## 2023-01-31 ENCOUNTER — TELEPHONE (OUTPATIENT)
Dept: FAMILY MEDICINE CLINIC | Facility: CLINIC | Age: 58
End: 2023-01-31

## 2023-01-31 NOTE — TELEPHONE ENCOUNTER
Pt called wanting to know if it is ok to take the generic Sudafed for sinus congestion  Also not sleeping well due to post nasal drip  Pls advise pt

## 2023-03-07 ENCOUNTER — OFFICE VISIT (OUTPATIENT)
Dept: FAMILY MEDICINE CLINIC | Facility: CLINIC | Age: 58
End: 2023-03-07

## 2023-03-07 VITALS
OXYGEN SATURATION: 96 % | SYSTOLIC BLOOD PRESSURE: 128 MMHG | WEIGHT: 223 LBS | HEIGHT: 65 IN | HEART RATE: 82 BPM | BODY MASS INDEX: 37.15 KG/M2 | DIASTOLIC BLOOD PRESSURE: 78 MMHG | TEMPERATURE: 97.7 F | RESPIRATION RATE: 18 BRPM

## 2023-03-07 DIAGNOSIS — I10 ESSENTIAL HYPERTENSION: ICD-10-CM

## 2023-03-07 DIAGNOSIS — E66.9 OBESITY, CLASS II, BMI 35-39.9: ICD-10-CM

## 2023-03-07 DIAGNOSIS — Z11.59 NEED FOR HEPATITIS C SCREENING TEST: ICD-10-CM

## 2023-03-07 DIAGNOSIS — Z00.00 ANNUAL PHYSICAL EXAM: Primary | ICD-10-CM

## 2023-03-07 PROBLEM — Z41.3: Status: RESOLVED | Noted: 2020-08-31 | Resolved: 2023-03-07

## 2023-03-07 PROBLEM — S01.312S: Status: RESOLVED | Noted: 2020-04-25 | Resolved: 2023-03-07

## 2023-03-07 RX ORDER — UBIDECARENONE 75 MG
200 CAPSULE ORAL DAILY
COMMUNITY

## 2023-03-07 NOTE — PROGRESS NOTES
[address] [facility]    NAME: Jennifer Menjivar  AGE: 56 y o  SEX: female  : 1965     DATE: 3/7/2023     Assessment and Plan:     Problem List Items Addressed This Visit     Essential hypertension     Much improved         Relevant Orders    CBC    Comprehensive metabolic panel    Lipid Panel with Direct LDL reflex    TSH, 3rd generation    Obesity, Class II, BMI 35-39 9     Low sugar diet recommended  Continue cardio exercise   Strength training recommended   We will recheck weight in 1 month        Other Visit Diagnoses     Annual physical exam    -  Primary    Need for hepatitis C screening test        Relevant Orders    Hepatitis C antibody          Immunizations and preventive care screenings were discussed with patient today  Appropriate education was printed on patient's after visit summary  Counseling:  Exercise: the importance of regular exercise/physical activity was discussed  Recommend exercise 3-5 times per week for at least 30 minutes  Shingles vaccine recommended       Return in about 1 month (around 2023) for weight check, lab review and Shingrix  Chief Complaint:     Chief Complaint   Patient presents with   • Physical Exam     Hannah Barnac      History of Present Illness:     Adult Annual Physical   Patient here for a comprehensive physical exam  The patient reports that she was having a hard time with losing her last position  She is interviewing  Her  has stepped up  Diet and Physical Activity  Diet/Nutrition: well balanced diet  Exercise: 3-4 times a week on average  Depression Screening  PHQ-2/9 Depression Screening    Little interest or pleasure in doing things: 0 - not at all  Feeling down, depressed, or hopeless: 0 - not at all  PHQ-2 Score: 0  PHQ-2 Interpretation: Negative depression screen       General Health  Sleep: sleeps well  Hearing: normal - bilateral   Vision: wears contacts  Dental: regular dental visits  /GYN Health  Patient is: postmenopausal  Has gyn     Review of Systems:     Review of Systems   Constitutional: Negative  Respiratory: Negative  Cardiovascular: Negative  Past Medical History:     Past Medical History:   Diagnosis Date   • Cancer ([facility] [address] )     L breast-lumpectomy   • Contact lens/glasses fitting    • History of chemotherapy    • History of depression    • History of radiation therapy    • Hypertension    • Kidney stone    • Malignant neoplasm of breast ([facility] [address] ) 2006    left   • Obesity (BMI 30-39  9)    • Premenopausal menorrhagia 3/28/2014      Past Surgical History:     Past Surgical History:   Procedure Laterality Date   • BREAST BIOPSY     • BREAST LUMPECTOMY Left    •  SECTION      x2   • COLONOSCOPY     • ENDOMETRIAL ABLATION     • FL RETROGRADE PYELOGRAM  2020   • INTRAOPERATIVE RADIATION THERAPY ([facility])      last assessed: 7/17/15   • OR COLONOSCOPY FLX DX W/COLLJ SPEC WHEN PFRMD N/A 3/22/2019    Procedure: COLONOSCOPY;  Surgeon: Donovan Lowry MD;  Location: [facility] GI LAB;   Service: Gastroenterology   • OR CYSTO/URETERO W/LITHOTRIPSY &INDWELL STENT INSRT Left 2020    Procedure: CYSTOSCOPY URETEROSCOPY WITH LITHOTRIPSY HIGH POWERED LASER, RETROGRADE PYELOGRAM, BASKET STONE EXTRACTION, AND INSERTION STENT URETERAL;  Surgeon: Cornelia Olszewski, MD;  Location: [facility] MAIN OR;  Service: Urology      Social History:     Social History     Socioeconomic History   • Marital status: /Civil Union     Spouse name: None   • Number of children: None   • Years of education: None   • Highest education level: None   Occupational History   • None   Tobacco Use   • Smoking status: Never   • Smokeless tobacco: Never   Vaping Use   • Vaping Use: Never used   Substance and Sexual Activity   • Alcohol use: No     Comment: socially   • Drug use: No   • Sexual activity: Yes     Partners: Male   Other Topics Concern   • None   Social History Narrative   • None     Social Determinants of Health     Financial Resource Strain: Not on file   Food Insecurity: Not on file   Transportation Needs: Not on file   Physical Activity: Not on file   Stress: Not on file   Social Connections: Not on file   Intimate Partner Violence: Not on file   Housing Stability: Not on file      Family History:     Family History   Problem Relation Age of Onset   • Hypertension Mother    • Heart disease Mother         CHF-related to smoking   • ADD / ADHD Daughter    • Hypertension Family    • No Known Problems Maternal Aunt    • No Known Problems Maternal Aunt    • No Known Problems Paternal Aunt    • Diabetes Neg Hx    • Stroke Neg Hx    • Thyroid disease Neg Hx       Current Medications:     Current Outpatient Medications   Medication Sig Dispense Refill   • acetaminophen (TYLENOL) 325 mg tablet Take 650 mg by mouth every 6 (six) hours as needed for mild pain     • celecoxib (CeleBREX) 200 mg capsule Take 1 capsule (200 mg total) by mouth 2 (two) times a day (Patient taking differently: Take 200 mg by mouth as needed) 20 capsule 0   • COLLAGEN-VITAMIN C-BIOTIN PO Take by mouth     • cyanocobalamin (VITAMIN B-12) 100 mcg tablet Take 200 mcg by mouth daily     • cyclobenzaprine (FLEXERIL) 5 mg tablet Take 1 tablet (5 mg total) by mouth daily at bedtime as needed for muscle spasms 20 tablet 1   • hydrochlorothiazide (HYDRODIURIL) 25 mg tablet Take 1 tablet (25 mg total) by mouth daily 90 tablet 1   • Multiple Vitamins-Minerals (CENTRUM SILVER 50+WOMEN PO) Take by mouth     • Probiotic Product (PROBIOTIC PO) Take by mouth       No current facility-administered medications for this visit  Allergies:      Allergies   Allergen Reactions   • Percocet [Oxycodone-Acetaminophen] Rash   • Percolone [Oxycodone] Rash   • Shellfish-Derived Products - Food Allergy Rash   • Pollen Extract Cough      Physical Exam:     /78 (BP Location: Left arm, Patient Position: Sitting, Cuff Size: Large)   Pulse 82   Temp 97 7 °F (36 5 °C)   Resp 18   Ht 5' 5" (1 651 m)   Wt 101 kg (223 lb)   SpO2 96%   BMI 37 11 kg/m²     Physical Exam  Vitals and nursing note reviewed  Constitutional:       Appearance: She is well-developed  HENT:      Head: Normocephalic and atraumatic  Right Ear: External ear normal       Left Ear: External ear normal       Nose: Nose normal    Cardiovascular:      Rate and Rhythm: Normal rate and regular rhythm  Heart sounds: Normal heart sounds  No murmur heard  No friction rub  Pulmonary:      Effort: No respiratory distress  Breath sounds: Normal breath sounds  No wheezing or rales  Abdominal:      Palpations: Abdomen is soft  Tenderness: There is no abdominal tenderness  Musculoskeletal:      Right lower leg: No edema  Left lower leg: No edema  Neurological:      Mental Status: She is oriented to person, place, and time  Cranial Nerves: No cranial nerve deficit          Vision Screening    Right eye Left eye Both eyes   Without correction 20/50 20/30 20/30   With correction      Comments: Forgot glasses/contacts         Zayda Smith, [address]

## 2023-03-07 NOTE — ASSESSMENT & PLAN NOTE
Low sugar diet recommended  Continue cardio exercise   Strength training recommended   We will recheck weight in 1 month

## 2023-03-11 LAB
ALBUMIN SERPL-MCNC: 4.2 G/DL (ref 3.8–4.9)
ALBUMIN/GLOB SERPL: 1.3 {RATIO} (ref 1.2–2.2)
ALP SERPL-CCNC: 80 IU/L (ref 44–121)
ALT SERPL-CCNC: 17 IU/L (ref 0–32)
AST SERPL-CCNC: 37 IU/L (ref 0–40)
BILIRUB SERPL-MCNC: 0.5 MG/DL (ref 0–1.2)
BUN SERPL-MCNC: 15 MG/DL (ref 6–24)
BUN/CREAT SERPL: 18 (ref 9–23)
CALCIUM SERPL-MCNC: 9.9 MG/DL (ref 8.7–10.2)
CHLORIDE SERPL-SCNC: 102 MMOL/L (ref 96–106)
CHOLEST SERPL-MCNC: 213 MG/DL (ref 100–199)
CO2 SERPL-SCNC: 24 MMOL/L (ref 20–29)
CREAT SERPL-MCNC: 0.84 MG/DL (ref 0.57–1)
EGFR: 82 ML/MIN/1.73
ERYTHROCYTE [DISTWIDTH] IN BLOOD BY AUTOMATED COUNT: 13.1 % (ref 11.7–15.4)
GLOBULIN SER-MCNC: 3.3 G/DL (ref 1.5–4.5)
GLUCOSE SERPL-MCNC: 81 MG/DL (ref 70–99)
HCT VFR BLD AUTO: 38.2 % (ref 34–46.6)
HCV AB S/CO SERPL IA: NON REACTIVE
HDLC SERPL-MCNC: 70 MG/DL
HGB BLD-MCNC: 12.8 G/DL (ref 11.1–15.9)
LDLC SERPL CALC-MCNC: 125 MG/DL (ref 0–99)
LDLC/HDLC SERPL: 1.8 RATIO (ref 0–3.2)
MCH RBC QN AUTO: 28.3 PG (ref 26.6–33)
MCHC RBC AUTO-ENTMCNC: 33.5 G/DL (ref 31.5–35.7)
MCV RBC AUTO: 85 FL (ref 79–97)
MICRODELETION SYND BLD/T FISH: NORMAL
PLATELET # BLD AUTO: 226 X10E3/UL (ref 150–450)
POTASSIUM SERPL-SCNC: 4.6 MMOL/L (ref 3.5–5.2)
PROT SERPL-MCNC: 7.5 G/DL (ref 6–8.5)
RBC # BLD AUTO: 4.52 X10E6/UL (ref 3.77–5.28)
SL AMB VLDL CHOLESTEROL CALC: 18 MG/DL (ref 5–40)
SODIUM SERPL-SCNC: 143 MMOL/L (ref 134–144)
TRIGL SERPL-MCNC: 100 MG/DL (ref 0–149)
TSH SERPL DL<=0.005 MIU/L-ACNC: 1.83 UIU/ML (ref 0.45–4.5)
WBC # BLD AUTO: 7.2 X10E3/UL (ref 3.4–10.8)

## 2023-03-25 ENCOUNTER — TELEPHONE (OUTPATIENT)
Dept: FAMILY MEDICINE CLINIC | Facility: CLINIC | Age: 58
End: 2023-03-25

## 2023-04-24 ENCOUNTER — TELEPHONE (OUTPATIENT)
Dept: FAMILY MEDICINE CLINIC | Facility: CLINIC | Age: 58
End: 2023-04-24

## 2023-04-24 NOTE — TELEPHONE ENCOUNTER
Pt would like to know with the medications that she takes can she take Nyguil Z at night? Please call back this evening to let her know

## 2023-05-08 ENCOUNTER — TELEPHONE (OUTPATIENT)
Dept: FAMILY MEDICINE CLINIC | Facility: CLINIC | Age: 58
End: 2023-05-08

## 2023-05-08 NOTE — TELEPHONE ENCOUNTER
5/8/2023 1:14 PM returned call to Reece Arnold     She would like to recheck her weight and consider adjusting her medication  I let her know that she would need an appointment for me to check her weight and adjust medication    She will call back to schedule when she is ready    Message complete  Hafsa Jean, DO

## 2023-05-08 NOTE — TELEPHONE ENCOUNTER
5/8/2023 7:31 PM returned call to [facility]     Left message on her voicemail to call the office     Ana Cristina Waller DO

## 2023-05-08 NOTE — TELEPHONE ENCOUNTER
Jenaro Zhao wants to know if she can stop in for a weigh in? And she has some questions for Dr Aman Ling over the phone    Please call [phone]

## 2023-05-09 NOTE — TELEPHONE ENCOUNTER
5/9/2023 1:07 PM Called Alva Frias   She went to let me know that she has a new insurance that started on May 1  She wanted know if everything would go through  I recommend that she talk to my front office staff to make sure she is covered here in the office and to let her pharmacy know about the new plan that she has    They will then be able to run her medication through to see if it is covered    Message complete  Bayron Varghese, DO

## 2023-05-15 ENCOUNTER — TELEPHONE (OUTPATIENT)
Dept: FAMILY MEDICINE CLINIC | Facility: CLINIC | Age: 58
End: 2023-05-15

## 2023-05-15 NOTE — TELEPHONE ENCOUNTER
Patient calling stating that she is currently in between insurances and she just took  Her last dose of medication  Patient is requesting a call back she stated that its not urgent    Harris Rosenthal

## 2023-06-15 ENCOUNTER — TELEPHONE (OUTPATIENT)
Dept: FAMILY MEDICINE CLINIC | Facility: CLINIC | Age: 58
End: 2023-06-15

## 2023-06-16 NOTE — TELEPHONE ENCOUNTER
6/16/2023 1:00 PM Called Selin Cheatham     Left message on her voicemail to call the office     Donald Genao DO

## 2023-06-16 NOTE — TELEPHONE ENCOUNTER
6/16/2023 2:33 PM tried Caesar Napoleon again    Her unemployment has run out and is still working on getting a new job  She doesn't want to use her 's insurance  She may have some trouble filling the qsymia  Encouraged to take it regularly at least the week before her appointment so that her vitals are accurate representation of her on the medication       Message complete  Preet Kline, DO

## 2023-06-19 ENCOUNTER — TELEPHONE (OUTPATIENT)
Dept: OBGYN CLINIC | Facility: CLINIC | Age: 58
End: 2023-06-19

## 2023-06-19 ENCOUNTER — OFFICE VISIT (OUTPATIENT)
Dept: FAMILY MEDICINE CLINIC | Facility: CLINIC | Age: 58
End: 2023-06-19
Payer: COMMERCIAL

## 2023-06-19 VITALS
HEIGHT: 65 IN | BODY MASS INDEX: 35.99 KG/M2 | SYSTOLIC BLOOD PRESSURE: 140 MMHG | OXYGEN SATURATION: 98 % | HEART RATE: 88 BPM | RESPIRATION RATE: 18 BRPM | DIASTOLIC BLOOD PRESSURE: 80 MMHG | TEMPERATURE: 97.2 F | WEIGHT: 216 LBS
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
## 2023-06-19 DIAGNOSIS — S09.90XA MILD CLOSED HEAD INJURY, INITIAL ENCOUNTER: ICD-10-CM

## 2023-06-19 PROCEDURE — 99213 OFFICE O/P EST LOW 20 MIN: CPT | Performed by: NURSE PRACTITIONER

## 2023-06-19 NOTE — PROGRESS NOTES
Assessment/Plan:    Exam WNL  Recommended Tylenol prn, heating pad, massage, ROM exercises  F/u for any new or progressive symptoms    1  Fall, initial encounter    2  Mild closed head injury, initial encounter            There are no Patient Instructions on file for this visit  Return if symptoms worsen or fail to improve  Subjective:      Patient ID: Gasper Tariq is a 56 y o  female  Chief Complaint   Patient presents with   • Head Injury     Saturday  [id number]       Pt was demonstrating exercises at home in her slippers, and when going backward, she fell backward, and her head hitting the rug  This occurred 2 days ago  No LOC or contusions at time of incident  She has some mild left sided head and neck discomfort  She denies any lethargy, vision disturbance, weakness, numbness/tingling  She took some Tylenol  No other injuries, aches or pains      The following portions of the patient's history were reviewed and updated as appropriate: allergies, current medications, past family history, past medical history, past social history, past surgical history and problem list     Review of Systems   Constitutional: Negative  HENT: Negative  Eyes: Negative for photophobia and visual disturbance  Gastrointestinal: Negative for nausea and vomiting  Musculoskeletal: Positive for neck stiffness  Neurological: Positive for headaches  Negative for weakness and numbness           Current Outpatient Medications   Medication Sig Dispense Refill   • acetaminophen (TYLENOL) 325 mg tablet Take 650 mg by mouth every 6 (six) hours as needed for mild pain     • celecoxib (CeleBREX) 200 mg capsule Take 1 capsule (200 mg total) by mouth 2 (two) times a day (Patient taking differently: Take 200 mg by mouth as needed) 20 capsule 0   • COLLAGEN-VITAMIN C-BIOTIN PO Take by mouth     • cyanocobalamin (VITAMIN B-12) 100 mcg tablet Take 200 mcg by mouth daily     • cyclobenzaprine (FLEXERIL) 5 mg tablet Take 1 tablet "(5 mg total) by mouth daily at bedtime as needed for muscle spasms 20 tablet 1   • hydrochlorothiazide (HYDRODIURIL) 25 mg tablet Take 1 tablet (25 mg total) by mouth daily 90 tablet 1   • loratadine (CLARITIN) 10 mg tablet Take 10 mg by mouth daily     • Multiple Vitamins-Minerals (CENTRUM SILVER 50+WOMEN PO) Take by mouth     • Phentermine-Topiramate (Qsymia) 3 75-23 MG CP24 Take 1 capsule by mouth daily 30 capsule 1   • Probiotic Product (PROBIOTIC PO) Take by mouth       No current facility-administered medications for this visit  Objective:    /80   Pulse 88   Temp (!) 97 2 °F (36 2 °C)   Resp 18   Ht 5' 5\" (1 651 m)   Wt 98 kg (216 lb)   SpO2 98%   BMI 35 94 kg/m²        Physical Exam  Vitals and nursing note reviewed  Constitutional:       Appearance: Normal appearance  She is well-developed  HENT:      Right Ear: Tympanic membrane normal       Left Ear: Tympanic membrane normal    Eyes:      Extraocular Movements: Extraocular movements intact  Conjunctiva/sclera: Conjunctivae normal       Pupils: Pupils are equal, round, and reactive to light  Cardiovascular:      Rate and Rhythm: Normal rate and regular rhythm  Pulses: Normal pulses  Heart sounds: Normal heart sounds  No murmur heard  Pulmonary:      Effort: Pulmonary effort is normal       Breath sounds: Normal breath sounds  Musculoskeletal:      Cervical back: Tenderness (left side) present  No swelling or deformity  Normal range of motion  Thoracic back: Normal       Lumbar back: Normal    Skin:     General: Skin is warm and dry  Neurological:      General: No focal deficit present  Mental Status: She is alert  Sensory: No sensory deficit  Motor: No weakness        Coordination: Coordination normal  Finger-Nose-Finger Test and Heel to Shin Test normal       Deep Tendon Reflexes: Reflexes normal    Psychiatric:         Mood and Affect: Mood normal          Behavior: Behavior normal        " [facility]

## 2023-06-19 NOTE — TELEPHONE ENCOUNTER
Caller: Patient     Doctor: Chip Hatfield    Reason for call: Had hit head over weekend looking to be seen  Per protocol our SM only treat 9-25       Advised to reach out to Neuro , PT or PCP     Call back#: n/a

## 2023-06-20 DIAGNOSIS — E66.9 OBESITY, CLASS II, BMI 35-39.9: ICD-10-CM

## 2023-06-20 RX ORDER — PHENTERMINE AND TOPIRAMATE 3.75; 23 MG/1; MG/1
CAPSULE, EXTENDED RELEASE ORAL
Qty: 30 CAPSULE | Refills: 1 | Status: SHIPPED | OUTPATIENT
Start: 2023-06-20

## 2023-06-22 ENCOUNTER — RA CDI HCC (OUTPATIENT)
Dept: OTHER | Facility: HOSPITAL | Age: 58
End: 2023-06-22

## 2023-06-22 NOTE — PROGRESS NOTES
[facility]  coding opportunities       Chart reviewed, no opportunity found: CHART REVIEWED, NO OPPORTUNITY FOUND        Patients Insurance        Commercial Insurance: Shan Zapata

## 2023-07-14 ENCOUNTER — OFFICE VISIT (OUTPATIENT)
Dept: FAMILY MEDICINE CLINIC | Facility: CLINIC | Age: 58
End: 2023-07-14
Payer: COMMERCIAL

## 2023-07-14 VITALS
HEART RATE: 84 BPM | DIASTOLIC BLOOD PRESSURE: 82 MMHG | RESPIRATION RATE: 16 BRPM | HEIGHT: 65 IN | BODY MASS INDEX: 36.18 KG/M2 | SYSTOLIC BLOOD PRESSURE: 132 MMHG | WEIGHT: 217.13 LBS | TEMPERATURE: 98 F

## 2023-07-14 DIAGNOSIS — E66.9 OBESITY, CLASS II, BMI 35-39.9: ICD-10-CM

## 2023-07-14 DIAGNOSIS — Z12.31 ENCOUNTER FOR SCREENING MAMMOGRAM FOR BREAST CANCER: ICD-10-CM

## 2023-07-14 DIAGNOSIS — I10 ESSENTIAL HYPERTENSION: Primary | ICD-10-CM

## 2023-07-14 PROCEDURE — 99213 OFFICE O/P EST LOW 20 MIN: CPT | Performed by: FAMILY MEDICINE

## 2023-07-14 RX ORDER — PHENTERMINE AND TOPIRAMATE 7.5; 46 MG/1; MG/1
1 CAPSULE, EXTENDED RELEASE ORAL DAILY
Qty: 30 CAPSULE | Refills: 3 | Status: SHIPPED | OUTPATIENT
Start: 2023-07-14

## 2023-07-14 NOTE — PROGRESS NOTES
Name: Dafne Matthews      : 1965      MRN: [id number]  Encounter Provider: Janey Myers DO  Encounter Date: 2023   Encounter department: [facility]     1. Essential hypertension  Assessment & Plan:  Stable  Continue hydrochlorothiazide 25 mg daily      2. Encounter for screening mammogram for breast cancer  -     Mammo screening bilateral w 3d & cad; Future; Expected date: 2023    3. Obesity, Class II, BMI 35-39.9  Assessment & Plan:  Improving but at a slow rate  Will increase dose to 7.5-46 mg a day   She will let me know if sh needs to go to a higher dose next month    Orders:  -     Phentermine-Topiramate (Qsymia) 7.5-46 MG CP24; Take 1 capsule by mouth in the morning    Return in about 3 months (around 10/14/2023) for Next scheduled follow up. Subjective      She has been doing well in the Qsymia. She has been able lose 10 pounds. She would like to lose more weight. She feels ready for the next higher dose. She did get a new job and started 2 weeks ago. She is very happy with her new position.     Review of Systems    Current Outpatient Medications on File Prior to Visit   Medication Sig   • acetaminophen (TYLENOL) 325 mg tablet Take 650 mg by mouth every 6 (six) hours as needed for mild pain   • celecoxib (CeleBREX) 200 mg capsule Take 1 capsule (200 mg total) by mouth 2 (two) times a day (Patient taking differently: Take 200 mg by mouth as needed)   • COLLAGEN-VITAMIN C-BIOTIN PO Take by mouth   • cyanocobalamin (VITAMIN B-12) 100 mcg tablet Take 200 mcg by mouth daily   • cyclobenzaprine (FLEXERIL) 5 mg tablet Take 1 tablet (5 mg total) by mouth daily at bedtime as needed for muscle spasms   • hydrochlorothiazide (HYDRODIURIL) 25 mg tablet Take 1 tablet (25 mg total) by mouth daily   • loratadine (CLARITIN) 10 mg tablet Take 10 mg by mouth daily   • Multiple Vitamins-Minerals (CENTRUM SILVER 50+WOMEN PO) Take by mouth   • [DISCONTINUED] Qsymia 3.75-23 MG CP24 TAKE 1 CAPSULE BY MOUTH EVERY DAY   • [DISCONTINUED] Probiotic Product (PROBIOTIC PO) Take by mouth (Patient not taking: Reported on 7/14/2023)       Objective     /82   Pulse 84   Temp 98 °F (36.7 °C) (Tympanic)   Resp 16   Ht 5' 5" (1.651 m)   Wt 98.5 kg (217 lb 2 oz)   BMI 36.13 kg/m²     Physical Exam  Vitals and nursing note reviewed. Constitutional:       Appearance: She is well-developed. HENT:      Head: Normocephalic and atraumatic. Right Ear: Tympanic membrane and external ear normal.      Left Ear: Tympanic membrane and external ear normal.   Cardiovascular:      Rate and Rhythm: Normal rate and regular rhythm. Heart sounds: Normal heart sounds. No murmur heard. No friction rub. Pulmonary:      Effort: Pulmonary effort is normal. No respiratory distress. Breath sounds: Normal breath sounds. No wheezing or rales. Musculoskeletal:      Right lower leg: No edema. Left lower leg: No edema.        Peyton Willoughby DO

## 2023-07-14 NOTE — ASSESSMENT & PLAN NOTE
Improving but at a slow rate  Will increase dose to 7.5-46 mg a day   She will let me know if sh needs to go to a higher dose next month

## 2023-07-24 ENCOUNTER — TELEPHONE (OUTPATIENT)
Dept: FAMILY MEDICINE CLINIC | Facility: CLINIC | Age: 58
End: 2023-07-24

## 2023-07-25 NOTE — TELEPHONE ENCOUNTER
7/25/2023 7:59 AM returned call to patient    The higher dose of Qysymia bothered her stomach. She needed to take a laxative. She has picked up the higher dose prescription and wasn't sure what she could do. I recommended that she try it every other day and take metamucil daily    She will see if she can tolerate the dose since she already bought the higher dose. If she doesn't tolerate it will go back to her previous dosage.     Message complete  Yisel Ny, DO

## 2023-08-09 ENCOUNTER — TELEPHONE (OUTPATIENT)
Dept: FAMILY MEDICINE CLINIC | Facility: CLINIC | Age: 58
End: 2023-08-09

## 2023-08-09 DIAGNOSIS — M62.838 MUSCLE SPASM: ICD-10-CM

## 2023-08-09 RX ORDER — CYCLOBENZAPRINE HCL 5 MG
5 TABLET ORAL
Qty: 20 TABLET | Refills: 1 | Status: SHIPPED | OUTPATIENT
Start: 2023-08-09

## 2023-08-09 NOTE — TELEPHONE ENCOUNTER
Telephone outreach to patient, she stated she needs her flexeril 5mg refilled for her bilateral lower leg muscle spasms. She did not realize there were no refills on her bottle.   Jing/SARAH

## 2023-08-29 ENCOUNTER — TELEPHONE (OUTPATIENT)
Dept: FAMILY MEDICINE CLINIC | Facility: CLINIC | Age: 58
End: 2023-08-29

## 2023-08-29 ENCOUNTER — HOSPITAL ENCOUNTER (OUTPATIENT)
Dept: RADIOLOGY | Facility: HOSPITAL | Age: 58
Discharge: HOME | End: 2023-08-29
Payer: COMMERCIAL

## 2023-08-29 DIAGNOSIS — Z12.31 ENCOUNTER FOR SCREENING MAMMOGRAM FOR BREAST CANCER: ICD-10-CM

## 2023-08-29 PROCEDURE — 77063 BREAST TOMOSYNTHESIS BI: CPT

## 2023-08-29 PROCEDURE — 77067 SCR MAMMO BI INCL CAD: CPT

## 2023-08-30 NOTE — TELEPHONE ENCOUNTER
8/30/2023 8:40 AM returned call to Minnie Gay     She has made an appointment with a therapist in September. She needs to talk to someone about her mental health. The month of August was tough for her. She just wanted to make me aware of what was going on since she had to list me as her primary physician.     Message complete  Blaire Wilson, DO

## 2023-09-02 DIAGNOSIS — I10 ESSENTIAL HYPERTENSION: ICD-10-CM

## 2023-09-02 RX ORDER — HYDROCHLOROTHIAZIDE 25 MG/1
25 TABLET ORAL DAILY
Qty: 90 TABLET | Refills: 1 | Status: SHIPPED | OUTPATIENT
Start: 2023-09-02

## 2023-12-05 ENCOUNTER — TELEPHONE (OUTPATIENT)
Dept: FAMILY MEDICINE CLINIC | Facility: CLINIC | Age: 58
End: 2023-12-05

## 2023-12-05 NOTE — TELEPHONE ENCOUNTER
Pt sinuses having been acting up, has been taking claritin, would like to know if you can prescribe something for her or recommend something she can try that's over the counter; please get back to her at [phone] and leave a detailed message if she does not answer

## 2023-12-05 NOTE — TELEPHONE ENCOUNTER
Patient returned call; RN provided PCP advise to use OTC Flonasa nasal spray and to follow up with office in 1.5-2 weeks in case patient needs to be seen. Patient verbalized understanding.

## 2023-12-05 NOTE — TELEPHONE ENCOUNTER
Please have her start over-the-counter Flonase  She should use it daily.   If she does not improve in 2 weeks she should follow-up in the office  Thank you,  Sven Mcmahon, DO

## 2023-12-05 NOTE — TELEPHONE ENCOUNTER
Patient returned phone call. Transferred to [facility] Robert Dong in [facility] to assist with message below.

## 2023-12-08 ENCOUNTER — OFFICE VISIT (OUTPATIENT)
Dept: FAMILY MEDICINE CLINIC | Facility: CLINIC | Age: 58
End: 2023-12-08
Payer: COMMERCIAL

## 2023-12-08 VITALS
HEART RATE: 68 BPM | DIASTOLIC BLOOD PRESSURE: 86 MMHG | SYSTOLIC BLOOD PRESSURE: 148 MMHG | RESPIRATION RATE: 16 BRPM | TEMPERATURE: 98.4 F

## 2023-12-08 DIAGNOSIS — J06.9 ACUTE URI: Primary | ICD-10-CM

## 2023-12-08 PROCEDURE — 99213 OFFICE O/P EST LOW 20 MIN: CPT | Performed by: NURSE PRACTITIONER

## 2023-12-08 RX ORDER — AMOXICILLIN 875 MG/1
875 TABLET, COATED ORAL 2 TIMES DAILY
Qty: 20 TABLET | Refills: 0 | Status: SHIPPED | OUTPATIENT
Start: 2023-12-08 | End: 2023-12-18

## 2023-12-08 RX ORDER — EPINEPHRINE 0.3 MG/.3ML
INJECTION SUBCUTANEOUS ONCE
COMMUNITY
Start: 2023-10-25

## 2023-12-08 RX ORDER — BENZONATATE 200 MG/1
200 CAPSULE ORAL 3 TIMES DAILY PRN
Qty: 20 CAPSULE | Refills: 0 | Status: SHIPPED | OUTPATIENT
Start: 2023-12-08

## 2023-12-08 NOTE — PROGRESS NOTES
Assessment/Plan:    1. Acute URI  -     amoxicillin (AMOXIL) 875 mg tablet; Take 1 tablet (875 mg total) by mouth 2 (two) times a day for 10 days  -     benzonatate (TESSALON) 200 MG capsule; Take 1 capsule (200 mg total) by mouth 3 (three) times a day as needed for cough          Patient Instructions: Take medication with food. It is important that you take the entire course of antibiotics prescribed. May also take a probiotic of your choice to maintain healthy GI javier. Can take some probiotic and yogurt with the medication. Supportive care discussed and advised. Advised to RTO for any worsening and no improvement. Follow up for no improvement and worsening of conditions. Patient advised and educated when to see immediate medical care. Return if symptoms worsen or fail to improve. No future appointments. Subjective:      Patient ID: Arron Davis is a 66 y.o. female. Chief Complaint   Patient presents with   • Sinus Problem   • Sore Throat     Started one week ago Jmstoney SMITH         Vitals:  /86   Pulse 68   Temp 98.4 °F (36.9 °C)   Resp 16     Patient stated that having post nasal drip from last week and progressed to cough. Denies fever, chills and sob. Currently taking flonase nasal spray and will continue with that. BP slightly elevated and has not taken HCTZ dose today yet and will take that and will continue to monitor BP at home and will follow back for any concerns. Sinus Problem  Associated symptoms include coughing and a sore throat. Pertinent negatives include no chills, congestion, diaphoresis, ear pain, headaches, shortness of breath, sinus pressure or sneezing. Sore Throat   Associated symptoms include coughing. Pertinent negatives include no abdominal pain, congestion, diarrhea, drooling, ear discharge, ear pain, headaches, shortness of breath, trouble swallowing or vomiting.            The following portions of the patient's history were reviewed and updated as appropriate: allergies, current medications, past family history, past medical history, past social history, past surgical history and problem list.      Review of Systems   Constitutional:  Negative for chills, diaphoresis, fatigue, fever and unexpected weight change. HENT:  Positive for postnasal drip and sore throat. Negative for congestion, dental problem, drooling, ear discharge, ear pain, facial swelling, hearing loss, mouth sores, nosebleeds, rhinorrhea, sinus pressure, sinus pain, sneezing, tinnitus, trouble swallowing and voice change. Respiratory:  Positive for cough. Negative for chest tightness, shortness of breath and wheezing. Cardiovascular: Negative. Gastrointestinal:  Negative for abdominal pain, constipation, diarrhea, nausea and vomiting. Musculoskeletal: Negative. Skin: Negative. Neurological:  Negative for dizziness, light-headedness and headaches. Objective:    Social History     Tobacco Use   Smoking Status Never   Smokeless Tobacco Never       Allergies:    Allergies   Allergen Reactions   • Percocet [Oxycodone-Acetaminophen] Rash   • Percolone [Oxycodone] Rash   • Shellfish-Derived Products - Food Allergy Rash   • Pollen Extract Cough         Current Outpatient Medications   Medication Sig Dispense Refill   • acetaminophen (TYLENOL) 325 mg tablet Take 650 mg by mouth every 6 (six) hours as needed for mild pain     • amoxicillin (AMOXIL) 875 mg tablet Take 1 tablet (875 mg total) by mouth 2 (two) times a day for 10 days 20 tablet 0   • benzonatate (TESSALON) 200 MG capsule Take 1 capsule (200 mg total) by mouth 3 (three) times a day as needed for cough 20 capsule 0   • celecoxib (CeleBREX) 200 mg capsule Take 1 capsule (200 mg total) by mouth 2 (two) times a day (Patient taking differently: Take 200 mg by mouth as needed) 20 capsule 0   • COLLAGEN-VITAMIN C-BIOTIN PO Take by mouth in the morning     • cyanocobalamin (VITAMIN B-12) 100 mcg tablet Take 200 mcg by mouth daily     • cyclobenzaprine (FLEXERIL) 5 mg tablet Take 1 tablet (5 mg total) by mouth daily at bedtime as needed for muscle spasms 20 tablet 1   • EPINEPHrine (EPIPEN) 0.3 mg/0.3 mL SOAJ Inject into a muscle once PRN     • hydrochlorothiazide (HYDRODIURIL) 25 mg tablet TAKE 1 TABLET (25 MG TOTAL) BY MOUTH DAILY. 90 tablet 1   • loratadine (CLARITIN) 10 mg tablet Take 10 mg by mouth daily     • Multiple Vitamins-Minerals (CENTRUM SILVER 50+WOMEN PO) Take by mouth in the morning     • Phentermine-Topiramate (Qsymia) 7.5-46 MG CP24 Take 1 capsule by mouth in the morning 30 capsule 3     No current facility-administered medications for this visit. Physical Exam  Vitals reviewed. Constitutional:       Appearance: Normal appearance. She is well-developed. HENT:      Head: Normocephalic. Right Ear: Tympanic membrane, ear canal and external ear normal.      Left Ear: Tympanic membrane, ear canal and external ear normal.      Nose: Mucosal edema present. Right Sinus: No maxillary sinus tenderness or frontal sinus tenderness. Left Sinus: No maxillary sinus tenderness or frontal sinus tenderness. Comments: Post nasal drip noted     Mouth/Throat:      Mouth: No oral lesions. Pharynx: No oropharyngeal exudate or posterior oropharyngeal erythema. Cardiovascular:      Rate and Rhythm: Normal rate and regular rhythm. Heart sounds: Normal heart sounds. Pulmonary:      Effort: Pulmonary effort is normal.      Breath sounds: Normal breath sounds. Musculoskeletal:         General: Normal range of motion. Cervical back: Neck supple. Lymphadenopathy:      Cervical:      Right cervical: No superficial or posterior cervical adenopathy. Left cervical: No superficial or posterior cervical adenopathy. Skin:     General: Skin is warm and dry. Neurological:      Mental Status: She is alert and oriented to person, place, and time.    Psychiatric:         Behavior: Behavior normal.         Thought Content:  Thought content normal.         Judgment: Judgment normal.                     NONA Teresa

## 2023-12-08 NOTE — PATIENT INSTRUCTIONS
Will take flonase nasal spray to each nostril daily and will take claritin once daily. Benzonatate (By mouth)   Benzonatate (pxo-KOJ-to-maldonado)  Treats cough. Brand Name(s): Reina Doan   There may be other brand names for this medicine. When This Medicine Should Not Be Used: You should not use this medicine if you have had an allergic reaction to benzonatate in the past.   How to Use This Medicine:   Capsule, Liquid Filled Capsule  Your doctor will tell you how much medicine to take and how often. Swallow the capsules whole; do not crush or chew. Chewing the capsule may numb your mouth and throat and you could choke. If a dose is missed: Take the missed dose as soon as possible. If it is almost time for the next dose, wait until then to take your medicine and skip the missed dose. You should not use two doses at the same time. How to Store and Dispose of This Medicine:   Keep this medication in the original tightly closed, light-resistant container. Store at room temperature, away from heat, moisture, and light. Ask your pharmacist, doctor, or health caregiver about the best way to dispose of any outdated medicine or medicine no longer needed. Keep all medicine away from children. Drugs and Foods to Avoid:   Ask your doctor or pharmacist before using any other medicine, including over-the-counter medicines, vitamins, and herbal products. Avoid drinking alcohol while taking this medicine. Warnings While Using This Medicine: If you are pregnant or breastfeeding, talk to your doctor before taking this medicine. Benzonatate is not recommended for children younger than 8years old.   Possible Side Effects While Using This Medicine:   Call your doctor right away if you notice any of these side effects:  Trouble breathing  Tightness in the chest or throat  Tremors, shakiness, seizures  Skin rash, itching  If you notice these less serious side effects, talk with your doctor:   Nausea, upset stomach  Headache  Mild dizziness  Drowsiness  Constipation  Stuffy nose  If you notice other side effects that you think are caused by this medicine, tell your doctor. Call your doctor for medical advice about side effects. You may report side effects to FDA at 5-250-MCH-7873  © Copyright [facility] 2023 Information is for End User's use only and may not be sold, redistributed or otherwise used for commercial purposes. The above information is an  only. It is not intended as medical advice for individual conditions or treatments. Talk to your doctor, nurse or pharmacist before following any medical regimen to see if it is safe and effective for you. Amoxicillin (By mouth)   Amoxicillin (f-jqa-b-NORIS-in)  Treats infections or stomach ulcers. This medicine is a penicillin antibiotic. Brand Name(s): Moxatag, Prevpac   There may be other brand names for this medicine. When This Medicine Should Not Be Used: This medicine is not right for everyone. You should not use it if you had an allergic reaction to amoxicillin, any type of penicillin, or a cephalosporin antibiotic. How to Use This Medicine:   Capsule, Liquid, Tablet, Chewable Tablet, Long Acting Tablet  Your doctor will tell you how much medicine to use. Do not use more than directed. Chewable tablet: You must chew the tablet before you swallow it. You may crush the tablet and mix the medicine with a small amount of food to make it easier to swallow. Oral liquid: Shake well just before each use. Measure the oral liquid medicine with a marked measuring spoon, oral syringe, or medicine cup. You may mix the oral liquid with a baby formula, milk, fruit juice, water, ginger ale, or another cold drink. Be sure your child drinks all of the mixture right away. Tablet for suspension: Place the tablet in a small drinking glass, and add 2 teaspoons of water. Do not use any other liquid.  Gently stir or swirl the water in the glass until the tablet is completely dissolved. Drink all of this mixture right away. Add more water to the glass and drink all of it to make sure you get all of the medicine. Do not chew or swallow the tablet for suspension. Take all of the medicine in your prescription to clear up your infection, even if you feel better after the first few doses. Take a dose as soon as you remember. If it is almost time for your next dose, wait until then and take a regular dose. Do not take extra medicine to make up for a missed dose. Store the tablets, capsules, and tablets for suspension at room temperature, away from heat, moisture, and direct light. Store the oral liquid in the refrigerator. Do not freeze. Throw away any unused medicine after 14 days. Drugs and Foods to Avoid:   Ask your doctor or pharmacist before using any other medicine, including over-the-counter medicines, vitamins, and herbal products. Some medicines can affect how amoxicillin works. Tell your doctor if you are also using any of the following:   Allopurinol  Probenecid  Birth control pills  A blood thinner  Warnings While Using This Medicine:   Tell your doctor if you are pregnant or breastfeeding, or if you have kidney disease, allergies, or a condition called phenylketonuria (PKU). Tell your doctor if you are on dialysis. This medicine can cause diarrhea. Call your doctor if the diarrhea becomes severe, does not stop, or is bloody. Do not take any medicine to stop diarrhea until you have talked to your doctor. Diarrhea can occur 2 months or more after you stop taking this medicine. Tell any doctor or dentist who treats you that you are using this medicine. This medicine may affect certain medical test results. Call your doctor if your symptoms do not improve or if they get worse. Use this medicine to treat only the infection your doctor has prescribed it for. Do not use this medicine for any infection or condition that has not been checked by a doctor.  This medicine will not treat the flu or the common cold. Keep all medicine out of the reach of children. Never share your medicine with anyone. Possible Side Effects While Using This Medicine:   Call your doctor right away if you notice any of these side effects: Allergic reaction: Itching or hives, swelling in your face or hands, swelling or tingling in your mouth or throat, chest tightness, trouble breathing  Blistering, peeling, or red skin rash  Diarrhea that may contain blood, stomach cramps, fever  If you notice these less serious side effects, talk with your doctor:   Mild diarrhea, nausea, or vomiting  Mild skin rash  If you notice other side effects that you think are caused by this medicine, tell your doctor. Call your doctor for medical advice about side effects. You may report side effects to FDA at 1-041-FDA-7955  © Copyright Jaleesa Mar 2023 Information is for End User's use only and may not be sold, redistributed or otherwise used for commercial purposes. The above information is an  only. It is not intended as medical advice for individual conditions or treatments. Talk to your doctor, nurse or pharmacist before following any medical regimen to see if it is safe and effective for you.

## 2023-12-22 ENCOUNTER — TELEPHONE (OUTPATIENT)
Age: 58
End: 2023-12-22

## 2023-12-22 NOTE — TELEPHONE ENCOUNTER
12/22/2023 5:09 PM returned call to Yuli     She has finished her amoxicillin and wanted to make sure she can have a drink over the holidays.  I let her know she could.    Message complete  Sienna Irene, DO

## 2023-12-22 NOTE — TELEPHONE ENCOUNTER
"Patient called and requested to speak to Dr. Irene in regards to the current medication (Amox) that she is taking.  She said it is not urgent but she would like to ask her something. When asked if she could elaborate, she stated, \"it was kind of personal\" and she would really like to talk to the doctor about it.  Please advise.   "

## 2024-01-08 ENCOUNTER — RA CDI HCC (OUTPATIENT)
Dept: OTHER | Facility: HOSPITAL | Age: 59
End: 2024-01-08

## 2024-01-22 ENCOUNTER — TELEPHONE (OUTPATIENT)
Age: 59
End: 2024-01-22

## 2024-01-22 DIAGNOSIS — E66.9 OBESITY, CLASS II, BMI 35-39.9: ICD-10-CM

## 2024-01-22 RX ORDER — PHENTERMINE AND TOPIRAMATE 7.5; 46 MG/1; MG/1
1 CAPSULE, EXTENDED RELEASE ORAL DAILY
Qty: 30 CAPSULE | Refills: 3 | Status: SHIPPED | OUTPATIENT
Start: 2024-01-22

## 2024-01-22 NOTE — TELEPHONE ENCOUNTER
Pt requested to speak with PCP.  Pt has questions in regards to a test that PCP wanted pt to take. Pt was not very detailed.           Please have PCP contact pt. Thank you for your help.

## 2024-01-22 NOTE — TELEPHONE ENCOUNTER
1/22/2024 6:42 PM returned call to Yuli    We discussed that she doesn't need to get lab work done tomorrow    Next labs due in March.      [place] prescription monitoring program report reviewed and is appropriate.    Qsymia refilled    Message complete  Sienna Irene, DO

## 2024-01-23 ENCOUNTER — TELEPHONE (OUTPATIENT)
Age: 59
End: 2024-01-23

## 2024-01-23 NOTE — TELEPHONE ENCOUNTER
PA for Phentermine-Topiramate (Qsymia) 7.5-46 mg Approved     Date(s) approved 1- to 1-        Patient advised by [x] [employer] Message                      [] Phone call       Pharmacy advised by [x]Fax ([facility])                                     []Phone call    Approval letter scanned into Media No not available at this time

## 2024-01-23 NOTE — TELEPHONE ENCOUNTER
PA for Phentermine-Topiramate (Qsymia) 7.5-46 mg     Submitted via  []CMM-KEY   [x][employer]-Case ID # [id number]  []Faxed to plan   []Other website   []Phone call Case ID #     Office notes sent, clinical questions answered. Awaiting determination

## 2024-01-24 NOTE — TELEPHONE ENCOUNTER
Patient calling to speak directly to Teresa on Prior Auth team.  Patient states it is in reference to the previous message in regards to the medication Prior Auth.

## 2024-02-23 NOTE — TELEPHONE ENCOUNTER
Spoke to patient, patient was unwilling to speak to me and wanted Dr. Roberta Samayoa directly   [address] [place] [address] No

## 2024-03-07 ENCOUNTER — TELEPHONE (OUTPATIENT)
Dept: FAMILY MEDICINE CLINIC | Facility: CLINIC | Age: 59
End: 2024-03-07

## 2024-03-07 ENCOUNTER — PATIENT MESSAGE (OUTPATIENT)
Dept: FAMILY MEDICINE CLINIC | Facility: CLINIC | Age: 59
End: 2024-03-07

## 2024-03-07 NOTE — TELEPHONE ENCOUNTER
3/7/2024 9:39 AM Called Yuli about her form  I can't say that she has 20/20 vision.  On her last physical she forgot her glasses.     If that is a problem then she needs to schedule another physical. It has been a year since her last one.    If it isn't a problem then I will complete the form.    Also, she needs a TB test.       Left message on her voicemail to call the office.   Sienna Irene, DO

## 2024-03-07 NOTE — PATIENT COMMUNICATION
Pt call was disconnected and as it was being transferred to the office pt disconnected call. Stated she would call back tomorrow or stop in.

## 2024-03-13 ENCOUNTER — OFFICE VISIT (OUTPATIENT)
Dept: FAMILY MEDICINE CLINIC | Facility: CLINIC | Age: 59
End: 2024-03-13
Payer: COMMERCIAL

## 2024-03-13 VITALS
HEIGHT: 65 IN | TEMPERATURE: 97.9 F | BODY MASS INDEX: 34.59 KG/M2 | WEIGHT: 207.6 LBS | SYSTOLIC BLOOD PRESSURE: 130 MMHG | DIASTOLIC BLOOD PRESSURE: 84 MMHG | RESPIRATION RATE: 17 BRPM | HEART RATE: 66 BPM

## 2024-03-13 DIAGNOSIS — Z01.84 IMMUNITY STATUS TESTING: ICD-10-CM

## 2024-03-13 DIAGNOSIS — I10 ESSENTIAL HYPERTENSION: ICD-10-CM

## 2024-03-13 DIAGNOSIS — Z11.1 PPD SCREENING TEST: ICD-10-CM

## 2024-03-13 DIAGNOSIS — Z00.00 ROUTINE ADULT HEALTH MAINTENANCE: Primary | ICD-10-CM

## 2024-03-13 PROCEDURE — 86580 TB INTRADERMAL TEST: CPT

## 2024-03-13 PROCEDURE — 99396 PREV VISIT EST AGE 40-64: CPT | Performed by: NURSE PRACTITIONER

## 2024-03-13 NOTE — PROGRESS NOTES
FAMILY PRACTICE HEALTH MAINTENANCE OFFICE VISIT  [facility]    NAME: Yuli Donis  AGE: 58 y.o. SEX: female  : 1965     DATE: 3/13/2024    Assessment and Plan     1. Routine adult health maintenance    2. PPD screening test  -     TB Skin Test    3. Immunity status testing  -     Rubeola antibody IgG; Future  -     Mumps antibody, IgG; Future  -     Rubella antibody, IgG; Future  -     Hepatitis B surface antigen; Future  -     Rubeola antibody IgG  -     Mumps antibody, IgG  -     Rubella antibody, IgG    4. Essential hypertension  Assessment & Plan:  Stable with current regimen          Patient Counseling:   Nutrition: Stressed importance of a well balanced diet, moderation of sodium/saturated fat, caloric balance and sufficient intake of fiber  Exercise: Stressed the importance of regular exercise with a goal of 150 minutes per week  Dental Health: Discussed daily flossing and brushing and regular dental visits   Sexuality: Discussed sexually transmitted infections, use of condoms and prevention of unintended pregnancy  Alcohol Use:  Recommended moderation of alcohol intake  Injury Prevention: Discussed Safety Belts, Safety Helmets, and Smoke Detectors    Immunizations reviewed: Declined recommended vaccinations  Discussed benefits of:  Colon Cancer Screening, Mammogram , Cervical Cancer screening, and Screening labs.  BMI Counseling: Body mass index is 35.08 kg/m². Discussed with patient's BMI with her. The BMI is above normal. Nutrition recommendations include reducing portion sizes, decreasing overall calorie intake, 3-5 servings of fruits/vegetables daily, and reducing fast food intake.    Return in about 2 days (around 3/15/2024).        Chief Complaint     Chief Complaint   Patient presents with   • Annual Exam     Luiza Milton LPN         History of Present Illness     HPI    Well Adult Physical   Patient here for a comprehensive physical exam.  Denies  any concerns and complains.  Needs forms done with immunity testing and TB screening for new job.  Complaint with medications and tolerating it well.  Due for pap and colonoscopy and counseled to schedule that.  Declined shingles vaccine at this time and will follow back if changes her mind        Diet and Physical Activity  Diet: well balanced diet  Exercise: frequently      Depression Screen  PHQ-2/9 Depression Screening    Little interest or pleasure in doing things: 0 - not at all  Feeling down, depressed, or hopeless: 0 - not at all  PHQ-2 Score: 0  PHQ-2 Interpretation: Negative depression screen          General Health  Hearing: Normal:  bilateral  Vision: no vision problems, most recent eye exam <1 year, and recommended to follow up with opthalmology  Dental: regular dental visits    Reproductive Health  Follows with gynecologist      The following portions of the patient's history were reviewed and updated as appropriate: allergies, current medications, past family history, past medical history, past social history, past surgical history and problem list.    Review of Systems     Review of Systems   Constitutional: Negative.    HENT: Negative.     Eyes: Negative.    Respiratory: Negative.     Cardiovascular: Negative.    Gastrointestinal: Negative.    Endocrine: Negative.    Genitourinary: Negative.    Musculoskeletal: Negative.    Skin: Negative.    Allergic/Immunologic: Negative.    Neurological: Negative.    Hematological: Negative.    Psychiatric/Behavioral: Negative.         Past Medical History     Past Medical History:   Diagnosis Date   • Cancer (HCC)     L breast-lumpectomy   • Contact lens/glasses fitting    • History of chemotherapy    • History of depression    • History of radiation therapy    • Hypertension    • Kidney stone    • Malignant neoplasm of breast (HCC) 2006    left   • Obesity (BMI 30-39.9)    • Premenopausal menorrhagia 3/28/2014       Past Surgical History     Past Surgical  History:   Procedure Laterality Date   • BREAST BIOPSY     • BREAST LUMPECTOMY Left    •  SECTION      x2   • COLONOSCOPY     • ENDOMETRIAL ABLATION     • FL RETROGRADE PYELOGRAM  2020   • INTRAOPERATIVE RADIATION THERAPY (IORT)      last assessed: 7/17/15   • MO COLONOSCOPY FLX DX W/COLLJ SPEC WHEN PFRMD N/A 3/22/2019    Procedure: COLONOSCOPY;  Surgeon: Monika Valdivia MD;  Location: [facility] GI LAB;  Service: Gastroenterology   • MO CYSTO/URETERO W/LITHOTRIPSY &INDWELL STENT INSRT Left 2020    Procedure: CYSTOSCOPY URETEROSCOPY WITH LITHOTRIPSY HIGH POWERED LASER, RETROGRADE PYELOGRAM, BASKET STONE EXTRACTION, AND INSERTION STENT URETERAL;  Surgeon: Alfonso Beckford MD;  Location: [facility] MAIN OR;  Service: Urology       Social History     Social History     Socioeconomic History   • Marital status: /Civil Union     Spouse name: None   • Number of children: None   • Years of education: None   • Highest education level: None   Occupational History   • None   Tobacco Use   • Smoking status: Never     Passive exposure: Never   • Smokeless tobacco: Never   Vaping Use   • Vaping status: Never Used   Substance and Sexual Activity   • Alcohol use: Yes     Comment: socially   • Drug use: No   • Sexual activity: Yes     Partners: Male   Other Topics Concern   • None   Social History Narrative   • None     Social Determinants of Health     Financial Resource Strain: Not on file   Food Insecurity: Not on file   Transportation Needs: Not on file   Physical Activity: Not on file   Stress: Not on file   Social Connections: Not on file   Intimate Partner Violence: Not on file   Housing Stability: Not on file       Family History     Family History   Problem Relation Age of Onset   • Hypertension Mother    • Heart disease Mother         CHF-related to smoking   • ADD / ADHD Daughter    • Hypertension Family    • No Known Problems Maternal Aunt    • No Known Problems Maternal Aunt    • No Known Problems  "Paternal Aunt    • Diabetes Neg Hx    • Stroke Neg Hx    • Thyroid disease Neg Hx        Current Medications       Current Outpatient Medications:   •  acetaminophen (TYLENOL) 325 mg tablet, Take 650 mg by mouth every 6 (six) hours as needed for mild pain, Disp: , Rfl:   •  COLLAGEN-VITAMIN C-BIOTIN PO, Take by mouth in the morning, Disp: , Rfl:   •  cyanocobalamin (VITAMIN B-12) 100 mcg tablet, Take 200 mcg by mouth daily, Disp: , Rfl:   •  cyclobenzaprine (FLEXERIL) 5 mg tablet, Take 1 tablet (5 mg total) by mouth daily at bedtime as needed for muscle spasms, Disp: 20 tablet, Rfl: 1  •  EPINEPHrine (EPIPEN) 0.3 mg/0.3 mL SOAJ, Inject into a muscle once PRN, Disp: , Rfl:   •  hydroCHLOROthiazide 25 mg tablet, TAKE 1 TABLET (25 MG TOTAL) BY MOUTH DAILY., Disp: 90 tablet, Rfl: 1  •  loratadine (CLARITIN) 10 mg tablet, Take 10 mg by mouth if needed, Disp: , Rfl:   •  Multiple Vitamins-Minerals (CENTRUM SILVER 50+WOMEN PO), Take by mouth in the morning, Disp: , Rfl:   •  Phentermine-Topiramate (Qsymia) 7.5-46 MG CP24, Take 1 capsule by mouth in the morning, Disp: 30 capsule, Rfl: 3     Allergies     Allergies   Allergen Reactions   • Percocet [Oxycodone-Acetaminophen] Rash   • Percolone [Oxycodone] Rash   • Shellfish-Derived Products - Food Allergy Rash   • Pollen Extract Cough       Objective     /84   Pulse 66   Temp 97.9 °F (36.6 °C)   Resp 17   Ht 5' 4.5\" (1.638 m)   Wt 94.2 kg (207 lb 9.6 oz)   BMI 35.08 kg/m²      Physical Exam  Vitals reviewed.   Constitutional:       Appearance: Normal appearance.   HENT:      Head: Normocephalic and atraumatic.      Salivary Glands: Right salivary gland is not tender. Left salivary gland is not tender.      Right Ear: Tympanic membrane, ear canal and external ear normal.      Left Ear: Tympanic membrane, ear canal and external ear normal. There is no impacted cerumen.      Nose: Nose normal. No congestion.      Mouth/Throat:      Mouth: Mucous membranes are moist. "      Pharynx: No oropharyngeal exudate or posterior oropharyngeal erythema.   Eyes:      General: Lids are normal.         Right eye: No discharge or hordeolum.         Left eye: No discharge or hordeolum.      Conjunctiva/sclera: Conjunctivae normal.      Right eye: Right conjunctiva is not injected. No exudate or hemorrhage.     Left eye: Left conjunctiva is not injected. No exudate or hemorrhage.     Pupils: Pupils are equal, round, and reactive to light.   Neck:      Thyroid: No thyromegaly or thyroid tenderness.   Cardiovascular:      Rate and Rhythm: Normal rate and regular rhythm.      Pulses: Normal pulses.      Heart sounds: Normal heart sounds.   Pulmonary:      Effort: Pulmonary effort is normal.      Breath sounds: Normal breath sounds.   Chest:      Chest wall: No deformity, swelling, tenderness, crepitus or edema. There is no dullness to percussion.      Comments:  and breast deferred as follows with gynecologist  Abdominal:      General: Abdomen is flat. Bowel sounds are normal.      Palpations: Abdomen is soft.      Tenderness: There is no abdominal tenderness.      Hernia: There is no hernia in the umbilical area, ventral area, left inguinal area or right inguinal area.   Musculoskeletal:         General: No swelling or tenderness. Normal range of motion.      Cervical back: Full passive range of motion without pain, normal range of motion and neck supple.      Right lower leg: No edema.      Left lower leg: No edema.   Lymphadenopathy:      Cervical:      Right cervical: No superficial or posterior cervical adenopathy.     Left cervical: No superficial or posterior cervical adenopathy.      Upper Body:      Right upper body: No supraclavicular or axillary adenopathy.      Left upper body: No supraclavicular or axillary adenopathy.      Lower Body: No right inguinal adenopathy. No left inguinal adenopathy.   Skin:     General: Skin is warm and dry.      Findings: No rash.   Neurological:       General: No focal deficit present.      Mental Status: She is alert and oriented to person, place, and time. Mental status is at baseline.      GCS: GCS eye subscore is 4. GCS verbal subscore is 5. GCS motor subscore is 6.      Motor: Motor function is intact.      Coordination: Coordination is intact. Coordination normal.      Gait: Gait normal.      Deep Tendon Reflexes: Reflexes are normal and symmetric.   Psychiatric:         Attention and Perception: Attention normal.         Mood and Affect: Mood normal.         Speech: Speech normal.         Behavior: Behavior normal. Behavior is cooperative.         Thought Content: Thought content normal.         Judgment: Judgment normal.           Vision Screening    Right eye Left eye Both eyes   Without correction 20/40 20/25 20/25   With correction              Katelin Templeton [facility]

## 2024-03-15 ENCOUNTER — CLINICAL SUPPORT (OUTPATIENT)
Dept: FAMILY MEDICINE CLINIC | Facility: CLINIC | Age: 59
End: 2024-03-15

## 2024-03-15 DIAGNOSIS — Z11.1 PPD SCREENING TEST: Primary | ICD-10-CM

## 2024-03-15 LAB
INDURATION: 0 MM
TB SKIN TEST: NEGATIVE

## 2024-03-19 ENCOUNTER — TELEPHONE (OUTPATIENT)
Age: 59
End: 2024-03-19

## 2024-03-19 LAB
HBV SURFACE AG SERPL QL IA: NEGATIVE
MEV IGG SER IA-ACNC: 55.4 AU/ML
MUV IGG SER IA-ACNC: <9 AU/ML
RUBV IGG SERPL IA-ACNC: 1.2 INDEX

## 2024-03-19 NOTE — TELEPHONE ENCOUNTER
Pt was requesting a call back from Dr. Irene, asked the pt what it is was in regards to but pt did not want to disclose. Pt stated it is nothing urgent and to give her a call back when she becomes available. Please advise with the pt thank you!

## 2024-03-20 ENCOUNTER — CLINICAL SUPPORT (OUTPATIENT)
Dept: FAMILY MEDICINE CLINIC | Facility: CLINIC | Age: 59
End: 2024-03-20
Payer: COMMERCIAL

## 2024-03-20 ENCOUNTER — TELEPHONE (OUTPATIENT)
Dept: FAMILY MEDICINE CLINIC | Facility: CLINIC | Age: 59
End: 2024-03-20

## 2024-03-20 DIAGNOSIS — Z23 ENCOUNTER FOR IMMUNIZATION: Primary | ICD-10-CM

## 2024-03-20 PROCEDURE — 90471 IMMUNIZATION ADMIN: CPT

## 2024-03-20 PROCEDURE — 90707 MMR VACCINE SC: CPT

## 2024-03-20 NOTE — TELEPHONE ENCOUNTER
3/20/2024 2:11 PM returned call to Yuli     Left message on her voicemail to call the office.   Sienna Irene, DO

## 2024-03-20 NOTE — TELEPHONE ENCOUNTER
Patient called in stating that her employer wants her to get the mumps vaccine.     Patient would like to get the vaccine today if possible.     She needs it to start work on Monday.    Please advise.    Chasity Frazier  CMA

## 2024-03-20 NOTE — TELEPHONE ENCOUNTER
Please schedule patient for MMR. Let her know it is a combination vaccination of measles, mumps and rubella. Inform her that mumps does not come alone and you can schedule  her today for nurse visit. NONA Steele

## 2024-03-20 NOTE — TELEPHONE ENCOUNTER
Spoke with patient while she was here for her nursing visit  She is going to restart her Qysmia  Sienna Irene, DO

## 2024-04-29 ENCOUNTER — TELEPHONE (OUTPATIENT)
Age: 59
End: 2024-04-29

## 2024-04-29 DIAGNOSIS — E66.9 OBESITY, CLASS II, BMI 35-39.9: ICD-10-CM

## 2024-04-29 RX ORDER — PHENTERMINE AND TOPIRAMATE 7.5; 46 MG/1; MG/1
1 CAPSULE, EXTENDED RELEASE ORAL DAILY
Qty: 30 CAPSULE | Refills: 3 | Status: SHIPPED | OUTPATIENT
Start: 2024-04-29

## 2024-04-29 NOTE — TELEPHONE ENCOUNTER
Pt called in to refill her medication. She is also requesting a call from Dr. Irene at her earliest convenience to discuss something. Pt did not give any further details

## 2024-05-15 ENCOUNTER — TELEPHONE (OUTPATIENT)
Dept: FAMILY MEDICINE CLINIC | Facility: CLINIC | Age: 59
End: 2024-05-15

## 2024-05-15 NOTE — TELEPHONE ENCOUNTER
5/15/2024 9:25 AM Called patient as she requested    She can take the Qsymia and claritin together.     Voice mail left with the recommendation (communication consent on file to leave messages)    Sienna Irene DO

## 2024-07-08 ENCOUNTER — TELEPHONE (OUTPATIENT)
Age: 59
End: 2024-07-08

## 2024-07-08 NOTE — TELEPHONE ENCOUNTER
Per patient will only schedule with office , Declined my offer to help. Office clerical unavailable at time of call

## 2024-07-09 ENCOUNTER — TELEPHONE (OUTPATIENT)
Age: 59
End: 2024-07-09

## 2024-07-09 NOTE — TELEPHONE ENCOUNTER
"Patient called and she stated she would like to see Dr. Irene for an appointment Monday evening.  She stated that back in May she went for a pedicure and the person doing the pedicure gauged the side of her foot.  She stated she \" doctored\" it up as much as she could, but it is still bothering her and is significantly different than her other foot.    Dr. Irene does not have any available appointments until September and Yuli would like to be seen this Monday evening.  Please advise if Dr. Irene can squeeze patient in onto her schedule for Monday evening.    Please call patient when decision is made.  Thank you!  "

## 2024-07-15 ENCOUNTER — OFFICE VISIT (OUTPATIENT)
Dept: FAMILY MEDICINE CLINIC | Facility: CLINIC | Age: 59
End: 2024-07-15
Payer: COMMERCIAL

## 2024-07-15 VITALS
HEART RATE: 78 BPM | WEIGHT: 211 LBS | SYSTOLIC BLOOD PRESSURE: 130 MMHG | RESPIRATION RATE: 16 BRPM | HEIGHT: 65 IN | BODY MASS INDEX: 35.16 KG/M2 | TEMPERATURE: 97.7 F | DIASTOLIC BLOOD PRESSURE: 88 MMHG

## 2024-07-15 DIAGNOSIS — L84 CALLUS OF FOOT: ICD-10-CM

## 2024-07-15 DIAGNOSIS — E66.9 OBESITY, CLASS II, BMI 35-39.9: ICD-10-CM

## 2024-07-15 DIAGNOSIS — I10 ESSENTIAL HYPERTENSION: Primary | ICD-10-CM

## 2024-07-15 DIAGNOSIS — Z12.31 ENCOUNTER FOR SCREENING MAMMOGRAM FOR BREAST CANCER: ICD-10-CM

## 2024-07-15 PROCEDURE — 99214 OFFICE O/P EST MOD 30 MIN: CPT | Performed by: FAMILY MEDICINE

## 2024-07-15 RX ORDER — PHENTERMINE AND TOPIRAMATE 11.25; 69 MG/1; MG/1
1 CAPSULE, EXTENDED RELEASE ORAL
Qty: 30 CAPSULE | Refills: 3 | Status: SHIPPED | OUTPATIENT
Start: 2024-07-15

## 2024-07-15 NOTE — PROGRESS NOTES
"Ambulatory Visit  Name: Yuli Donis      : 1965      MRN: [id number]  Encounter Provider: Sienna Irene DO  Encounter Date: 7/15/2024   Encounter department: [facility]    Assessment & Plan   1. Essential hypertension  Assessment & Plan:  Well controlled    Continue Hydrochlorothiazide 25 mg a day   Orders:  -     CBC; Future; Expected date: 2024  -     Comprehensive metabolic panel; Future; Expected date: 2024  -     Lipid Panel with Direct LDL reflex; Future; Expected date: 2024  -     CBC  -     Comprehensive metabolic panel  -     Lipid Panel with Direct LDL reflex  2. Callus of foot  Comments:  vaseline recommended   more supportive shoes recommended  3. Obesity, Class II, BMI 35-39.9  Assessment & Plan:  Worsening  Qsymia dose increased to 11.25-69 mg a day   Orders:  -     Phentermine-Topiramate (Qsymia) 11.25-69 MG CP24; Take 1 capsule by mouth daily before breakfast  4. Encounter for screening mammogram for breast cancer  -     Mammo screening bilateral w 3d & cad; Future; Expected date: 07/15/2024     History of Present Illness     She had a pedicure done Mother's Day weekend.   They used a disposable rough pad to get off the calluses.   She got sore spots on her feet bilaterally.     The area is sore     She is continue to struggle with her weight.  She does like her new job and things have been better in that respect.  She continues to work with her therapist as well        Review of Systems    Objective     /88   Pulse 78   Temp 97.7 °F (36.5 °C)   Resp 16   Ht 5' 4.5\" (1.638 m)   Wt 95.7 kg (211 lb) Comment: refused  BMI 35.66 kg/m²     Physical Exam  Skin:     Comments: Hard calluses bilateral lateral aspects of feet, see pictures          Administrative Statements           "

## 2024-08-06 ENCOUNTER — TELEPHONE (OUTPATIENT)
Dept: FAMILY MEDICINE CLINIC | Facility: CLINIC | Age: 59
End: 2024-08-06

## 2024-08-06 ENCOUNTER — ANNUAL EXAM (OUTPATIENT)
Dept: OBGYN CLINIC | Facility: CLINIC | Age: 59
End: 2024-08-06
Payer: COMMERCIAL

## 2024-08-06 VITALS — HEIGHT: 65 IN | BODY MASS INDEX: 35.66 KG/M2 | DIASTOLIC BLOOD PRESSURE: 88 MMHG | SYSTOLIC BLOOD PRESSURE: 140 MMHG

## 2024-08-06 DIAGNOSIS — Z01.419 WELL WOMAN EXAM: Primary | ICD-10-CM

## 2024-08-06 DIAGNOSIS — Z85.3 HISTORY OF LEFT BREAST CANCER: ICD-10-CM

## 2024-08-06 DIAGNOSIS — J01.00 ACUTE MAXILLARY SINUSITIS, RECURRENCE NOT SPECIFIED: Primary | ICD-10-CM

## 2024-08-06 PROCEDURE — G0145 SCR C/V CYTO,THINLAYER,RESCR: HCPCS | Performed by: PHYSICIAN ASSISTANT

## 2024-08-06 PROCEDURE — S0612 ANNUAL GYNECOLOGICAL EXAMINA: HCPCS | Performed by: PHYSICIAN ASSISTANT

## 2024-08-06 PROCEDURE — G0476 HPV COMBO ASSAY CA SCREEN: HCPCS | Performed by: PHYSICIAN ASSISTANT

## 2024-08-06 RX ORDER — BENZONATATE 200 MG/1
200 CAPSULE ORAL 2 TIMES DAILY PRN
Qty: 20 CAPSULE | Refills: 0 | Status: SHIPPED | OUTPATIENT
Start: 2024-08-06

## 2024-08-06 RX ORDER — AMOXICILLIN 875 MG/1
875 TABLET, COATED ORAL 2 TIMES DAILY
Qty: 14 TABLET | Refills: 0 | Status: SHIPPED | OUTPATIENT
Start: 2024-08-06 | End: 2024-08-13

## 2024-08-06 NOTE — TELEPHONE ENCOUNTER
8/6/2024 8:18 AM returned call to Yuli     Her sinuses are acting up.  She has a lot of sinus congestion.  She would like a new prescription for amoxicillin and the cough medicine that she had in December.    I sent the new prescriptions and I also recommended that she do a home COVID test    Message jermaine Irene, DO

## 2024-08-06 NOTE — TELEPHONE ENCOUNTER
Yes, she can finish what she has from before first  Then she should complete the new course of amoxicillin    Thank you,  Sienna Irene, DO

## 2024-08-06 NOTE — TELEPHONE ENCOUNTER
Patient called back requesting to speak with Dr. Irene again as she had one more question.  Patient stated has 2 pills left of each the amoxicillin and benzonatate pills from her previous prescription that  on 24.  She would like to know if it is okay to take these first and then continue with what was prescribed today.  Please advise.  Thank you!

## 2024-08-07 LAB
HPV HR 12 DNA CVX QL NAA+PROBE: NEGATIVE
HPV16 DNA CVX QL NAA+PROBE: NEGATIVE
HPV18 DNA CVX QL NAA+PROBE: NEGATIVE

## 2024-08-13 LAB
LAB AP GYN PRIMARY INTERPRETATION: NORMAL
Lab: NORMAL

## 2024-09-05 DIAGNOSIS — I10 ESSENTIAL HYPERTENSION: ICD-10-CM

## 2024-09-05 RX ORDER — HYDROCHLOROTHIAZIDE 25 MG/1
25 TABLET ORAL DAILY
Qty: 30 TABLET | Refills: 0 | Status: SHIPPED | OUTPATIENT
Start: 2024-09-05

## 2024-09-13 ENCOUNTER — TELEPHONE (OUTPATIENT)
Dept: FAMILY MEDICINE CLINIC | Facility: CLINIC | Age: 59
End: 2024-09-13

## 2024-09-13 NOTE — TELEPHONE ENCOUNTER
DR SAAVEDRA    Patient is asking for a refill of antibiotics and the cough pills you gave her.    Please call back.

## 2024-10-01 ENCOUNTER — TELEPHONE (OUTPATIENT)
Age: 59
End: 2024-10-01

## 2024-10-01 DIAGNOSIS — J01.00 ACUTE MAXILLARY SINUSITIS, RECURRENCE NOT SPECIFIED: ICD-10-CM

## 2024-10-01 NOTE — TELEPHONE ENCOUNTER
Patient called requesting an urgent refill on her allergy medication (patient did not know the name of medication) because she is going out of town soon. Patient's preferred pharmacy is [facility] in [facility]

## 2024-10-02 DIAGNOSIS — I10 ESSENTIAL HYPERTENSION: ICD-10-CM

## 2024-10-02 RX ORDER — HYDROCHLOROTHIAZIDE 25 MG/1
25 TABLET ORAL DAILY
Qty: 90 TABLET | Refills: 1 | Status: SHIPPED | OUTPATIENT
Start: 2024-10-02

## 2024-10-02 RX ORDER — AMOXICILLIN 875 MG
875 TABLET ORAL 2 TIMES DAILY
Qty: 14 TABLET | Refills: 0 | Status: SHIPPED | OUTPATIENT
Start: 2024-10-02 | End: 2024-10-09

## 2024-10-02 RX ORDER — BENZONATATE 200 MG/1
200 CAPSULE ORAL 2 TIMES DAILY PRN
Qty: 20 CAPSULE | Refills: 0 | Status: SHIPPED | OUTPATIENT
Start: 2024-10-02

## 2024-10-02 NOTE — TELEPHONE ENCOUNTER
Please let her know that I sent the prescriptions into [facility] in [place] for her  Thank you,  Dr. Irene

## 2024-10-02 NOTE — TELEPHONE ENCOUNTER
Patient called in stating she is leaving for vacation and needs medication refills for her Amoxicillin (no longer on active medication list) and the Benzonatate 200 mg capsule. Patient stated she is given these scripts for her allergies. Patient wanted to speak to someone in the office. Attempted to call clerical and was unsuccessful. Patient would like a call back ASAP today in-regards to this. Thank you.

## 2024-10-28 ENCOUNTER — OFFICE VISIT (OUTPATIENT)
Dept: FAMILY MEDICINE CLINIC | Facility: CLINIC | Age: 59
End: 2024-10-28
Payer: COMMERCIAL

## 2024-10-28 VITALS
HEIGHT: 65 IN | DIASTOLIC BLOOD PRESSURE: 80 MMHG | RESPIRATION RATE: 18 BRPM | WEIGHT: 209 LBS | BODY MASS INDEX: 34.82 KG/M2 | OXYGEN SATURATION: 98 % | HEART RATE: 76 BPM | TEMPERATURE: 97.4 F | SYSTOLIC BLOOD PRESSURE: 128 MMHG

## 2024-10-28 DIAGNOSIS — I10 ESSENTIAL HYPERTENSION: Primary | ICD-10-CM

## 2024-10-28 DIAGNOSIS — E66.812 OBESITY, CLASS II, BMI 35-39.9: ICD-10-CM

## 2024-10-28 DIAGNOSIS — Z23 NEEDS FLU SHOT: ICD-10-CM

## 2024-10-28 DIAGNOSIS — Z12.11 SCREENING FOR COLON CANCER: ICD-10-CM

## 2024-10-28 PROCEDURE — 90673 RIV3 VACCINE NO PRESERV IM: CPT

## 2024-10-28 PROCEDURE — 90471 IMMUNIZATION ADMIN: CPT

## 2024-10-28 PROCEDURE — 99214 OFFICE O/P EST MOD 30 MIN: CPT | Performed by: FAMILY MEDICINE

## 2024-10-28 NOTE — PROGRESS NOTES
"Ambulatory Visit  Name: Yuli Donis      : 1965      MRN: [id number]  Encounter Provider: Sienna Irene DO  Encounter Date: 10/28/2024   [facility]    Assessment & Plan  Essential hypertension  Blood pressure is well-controlled  Continue hydrochlorothiazide 25 mg daily  Orders:    CBC; Future    Comprehensive metabolic panel; Future    Lipid Panel with Direct LDL reflex; Future    CBC    Comprehensive metabolic panel    Lipid Panel with Direct LDL reflex    Screening for colon cancer    Orders:    Ambulatory referral to Gastroenterology; Future    Needs flu shot    Orders:    influenza vaccine, recombinant, PF, 0.5 mL IM (Flublok)    Obesity, Class II, BMI 35-39.9  Continue Qsymia 11.25-69 mg daily  She is down 2 pounds.  Encouraged her to continue to work on weight loss          Return in about 6 months (around 2025) for Next scheduled follow up.  History of Present Illness     She has been taking the qsymia somewhat regularly. It does help her.  She has to take it every other day and t works well for her.     She is working with her therapist.           Review of Systems        Objective     /80   Pulse 76   Temp (!) 97.4 °F (36.3 °C)   Resp 18   Ht 5' 4.5\" (1.638 m)   Wt 94.8 kg (209 lb)   SpO2 98%   BMI 35.32 kg/m²     Physical Exam  Vitals and nursing note reviewed.   Constitutional:       Appearance: She is well-developed.   HENT:      Head: Normocephalic and atraumatic.      Right Ear: External ear normal.      Left Ear: External ear normal.      Nose: Nose normal.   Cardiovascular:      Rate and Rhythm: Normal rate and regular rhythm.      Heart sounds: Normal heart sounds. No murmur heard.     No friction rub.   Pulmonary:      Effort: No respiratory distress.      Breath sounds: Normal breath sounds. No wheezing or rales.   Abdominal:      Palpations: Abdomen is soft.      Tenderness: There is no abdominal tenderness.   Musculoskeletal:      " Right lower leg: No edema.      Left lower leg: No edema.   Neurological:      Mental Status: She is oriented to person, place, and time.      Cranial Nerves: No cranial nerve deficit.

## 2024-10-28 NOTE — ASSESSMENT & PLAN NOTE
Continue Qsymia 11.25-69 mg daily  She is down 2 pounds.  Encouraged her to continue to work on weight loss

## 2024-10-28 NOTE — ASSESSMENT & PLAN NOTE
Blood pressure is well-controlled  Continue hydrochlorothiazide 25 mg daily  Orders:    CBC; Future    Comprehensive metabolic panel; Future    Lipid Panel with Direct LDL reflex; Future    CBC    Comprehensive metabolic panel    Lipid Panel with Direct LDL reflex

## 2024-11-13 ENCOUNTER — TELEPHONE (OUTPATIENT)
Age: 59
End: 2024-11-13

## 2024-11-13 ENCOUNTER — PREP FOR PROCEDURE (OUTPATIENT)
Age: 59
End: 2024-11-13

## 2024-11-13 DIAGNOSIS — Z12.11 SCREENING FOR COLON CANCER: Primary | ICD-10-CM

## 2024-11-13 NOTE — LETTER
Attached are your prep instructions for your upcoming procedure on 1/24/2025. If you have any questions or concerns please contact us at [phone]          Medicine Instructions for Adults with Diabetes who Need a Bowel Prep       Follow these instructions when a BOWEL PREP is required for your procedure or surgery!    NOTE:   GLP-1 Agonists taken weekly: do not take in the 7 days before your procedure   SGLT-2 Inhibitors: do not take in the 4 days before your procedure     On the Day Before Surgery/Procedure  If you are having a procedure (e.g. Colonoscopy) or surgery that requires a bowel prep and you may have at least a clear liquid diet, follow the directions below based on the type of medicine you take for your diabetes.     Type of Medicine You Take Examples What to do   Pre-Mixed Insulin - Intermediate Acting Humalog® 75/25, Humulin® 70/30, Novolog® 70/30, Regular Insulin Take ½ your regular dose the evening before your procedure   Rapid/Fast Acting Insulin Humalog® U200, NovoLog®, Apidra®, Fiasp® Take ½ your regular dose the evening before your procedure.   Long-Acting Insulin Lantus®, Levemir®, Tresiba®, Toujeo®, Basaglar® Take your FULL regular dose the day before procedure   Oral Sulfonylurea Glipizide/Glimepiride/Glucotrol® Take ½ your regular dose the evening before your procedure   Other Oral Diabetes Medicines Metformin®, Glucophage®, Glucophage XR®, Riomet®, Glumetza®), Actose®, Avandia®, Glyset®, Prandin® Take your regular dose with dinner in the evening before your procedure   GLP-1 Agonists AdlyxinÒ, ByettaÒ, BydureonÒ, OzempicÒ, SoliquaÒ, TanzeumÒ, TrulicityÒ, VictozaÒ, Saxenda®, Rybelsus® If taken daily, take as normal    If taken weekly, do not take this medicine for 7 days before your procedure including the day of the procedure (resume taking after the procedure)   SGLT-2 Inhibitors Jardiance®, Invokana®, Farxiga®,   Steglatro®, Brenzavvy®, Qtern®, Segluromet®, Glyxambi®, Synjardy®,  Synjardy XR®, Invokamet®, Invokamet XR®, Trijary XR®, Xigduo XR®, Steglujan® Do not take for 4 days before your procedure including the day of the procedure (resume taking after the procedure)                More information continued on back                    Medicine Instructions for Adults with Diabetes who Need a Bowel Prep  Page 2      On the Day of Surgery/Procedure  Follow the directions below based on the type of medicine you take for your diabetes.     Type of Medicine You Take Examples What to do   Long-Acting Insulin Lantus®, Levemir®, Tresiba®, Toujeo®, Basaglar®, Semglee®   If you usually take your Long-Acting Insulin in the morning, take the full dose as scheduled.   GLP-1 Agonists AdlyxinÒ, ByettaÒ, BydureonÒ, OzempicÒ, SoliquaÒ, TanzeumÒ, TrulicityÒ, VictozaÒ, Saxenda®, Rybelsus® Do NOT take this medicine on the day of your procedure (resume taking after the procedure)       On the Day of Surgery/Procedure (continued)  Except for the morning Long-Acting Insulin, DO NOT take ANY diabetic medicine on the day of your procedure unless you were instructed by the doctor who manages your diabetes medicines.    Continue to check your blood sugars.  If you have an insulin pump, ask your endocrinologist for instructions at least 3 days before your procedure. NOTE: If you are not able to ask your endocrinologist in advance, on the day of the procedure set your insulin pump to your basal rate only. Bring your insulin pump supplies to the hospital.     If you have any questions about taking your diabetes medicines prior to your procedure, please contact the doctor who manages your diabetes medicines.    Thank you,     [facility]

## 2024-11-13 NOTE — TELEPHONE ENCOUNTER
11/13/24  Screened by: Cheyenne Victor    Referring Provider Dr Irene    Pre- Screening:     There is no height or weight on file to calculate BMI.209lbs 35.32  Has patient been referred for a routine screening Colonoscopy? yes  Is the patient between 45-75 years old? yes      Previous Colonoscopy yes   If yes:5 years    Date:     Facility:     Reason:     Does the patient want to see a Gastroenterologist prior to their procedure OR are they having any GI symptoms? no    Has the patient been hospitalized or had abdominal surgery in the past 6 months? no    Does the patient use supplemental oxygen? no    Does the patient take Coumadin, Lovenox, Plavix, Elliquis, Xarelto, or other blood thinning medication? no    Has the patient had a stroke, cardiac event, or stent placed in the past year? no        If patient is between 45yrs - 49yrs, please advise patient that we will have to confirm benefits & coverage with their insurance company for a routine screening colonoscopy.

## 2024-11-13 NOTE — TELEPHONE ENCOUNTER
Scheduled date of colonoscopy (as of today):1/24/2025  Physician performing colonoscopy:Dr Valdivia  Location of colonoscopy:[facility]  Bowel prep reviewed with patient:Miralax/Dulcolax  Instructions reviewed with patient by:sent via letter  Clearances: N/A

## 2024-11-21 ENCOUNTER — TELEPHONE (OUTPATIENT)
Age: 59
End: 2024-11-21

## 2024-11-21 NOTE — TELEPHONE ENCOUNTER
"Patient of Keerthi BELGICATennille, last seen 12/6/2021, overdue for follow up    Calling with complaints that she started with \"twinging\" , describes it as mild in nature. She is concerned this could be a kidney stone issue. She is not concerned but wanted it documented in her record    Denies pain, urinary symptoms    Scheduled for a follow up on 2/6/2025, please advise if any testing will need to be done before appointment. CB #[phone]  or can send a my chart message    "

## 2024-11-22 NOTE — TELEPHONE ENCOUNTER
Patient has not been seen in 3 years. Should have appointment in near future to re-establish care and imaging to be ordered at that time. Can use same day. ER precautions. Thanks

## 2024-11-26 ENCOUNTER — TELEPHONE (OUTPATIENT)
Dept: UROLOGY | Facility: CLINIC | Age: 59
End: 2024-11-26

## 2024-11-26 NOTE — TELEPHONE ENCOUNTER
Phoned patient to r/s the appointment 2/6 at 7:20 with Rosana, offering 8:20 NP slot with Rosana.  The call was disconnected.

## 2024-11-26 NOTE — TELEPHONE ENCOUNTER
Called pt and she does not want a sooner appt. She wants to stick with the appt at [place]. If any new symptoms start she will notify us.

## 2024-12-17 LAB
ALBUMIN SERPL-MCNC: 3.9 G/DL (ref 3.8–4.9)
ALP SERPL-CCNC: 73 IU/L (ref 44–121)
ALT SERPL-CCNC: 16 IU/L (ref 0–32)
AST SERPL-CCNC: 22 IU/L (ref 0–40)
BILIRUB SERPL-MCNC: 0.4 MG/DL (ref 0–1.2)
BUN SERPL-MCNC: 20 MG/DL (ref 6–24)
BUN/CREAT SERPL: 23 (ref 9–23)
CALCIUM SERPL-MCNC: 9.2 MG/DL (ref 8.7–10.2)
CHLORIDE SERPL-SCNC: 103 MMOL/L (ref 96–106)
CHOLEST SERPL-MCNC: 207 MG/DL (ref 100–199)
CO2 SERPL-SCNC: 26 MMOL/L (ref 20–29)
CREAT SERPL-MCNC: 0.88 MG/DL (ref 0.57–1)
EGFR: 77 ML/MIN/1.73
ERYTHROCYTE [DISTWIDTH] IN BLOOD BY AUTOMATED COUNT: 12.8 % (ref 11.7–15.4)
GLOBULIN SER-MCNC: 3.1 G/DL (ref 1.5–4.5)
GLUCOSE SERPL-MCNC: 86 MG/DL (ref 70–99)
HCT VFR BLD AUTO: 38.1 % (ref 34–46.6)
HDLC SERPL-MCNC: 73 MG/DL
HGB BLD-MCNC: 12.4 G/DL (ref 11.1–15.9)
LDLC SERPL CALC-MCNC: 121 MG/DL (ref 0–99)
LDLC/HDLC SERPL: 1.7 RATIO (ref 0–3.2)
MCH RBC QN AUTO: 28.1 PG (ref 26.6–33)
MCHC RBC AUTO-ENTMCNC: 32.5 G/DL (ref 31.5–35.7)
MCV RBC AUTO: 86 FL (ref 79–97)
MICRODELETION SYND BLD/T FISH: NORMAL
PLATELET # BLD AUTO: 210 X10E3/UL (ref 150–450)
POTASSIUM SERPL-SCNC: 4 MMOL/L (ref 3.5–5.2)
PROT SERPL-MCNC: 7 G/DL (ref 6–8.5)
RBC # BLD AUTO: 4.41 X10E6/UL (ref 3.77–5.28)
SL AMB VLDL CHOLESTEROL CALC: 13 MG/DL (ref 5–40)
SODIUM SERPL-SCNC: 141 MMOL/L (ref 134–144)
TRIGL SERPL-MCNC: 73 MG/DL (ref 0–149)
WBC # BLD AUTO: 5.9 X10E3/UL (ref 3.4–10.8)

## 2024-12-18 ENCOUNTER — RESULTS FOLLOW-UP (OUTPATIENT)
Dept: FAMILY MEDICINE CLINIC | Facility: CLINIC | Age: 59
End: 2024-12-18

## 2024-12-18 ENCOUNTER — TELEPHONE (OUTPATIENT)
Dept: FAMILY MEDICINE CLINIC | Facility: CLINIC | Age: 59
End: 2024-12-18

## 2024-12-18 NOTE — TELEPHONE ENCOUNTER
Venkata Irene,      Would you please give me call to discuss my Blood Work results today.     My cell phone number is [phone].     Thank you,   Yuli Donis

## 2024-12-18 NOTE — TELEPHONE ENCOUNTER
12/18/2024 1:14 PM called Yuli as requested    She was wondering about her heart disease risk  We discussed her good HDL level and her good LDL/HDL ratio    Sienna Irene DO

## 2024-12-30 ENCOUNTER — HOSPITAL ENCOUNTER (OUTPATIENT)
Dept: RADIOLOGY | Facility: HOSPITAL | Age: 59
Discharge: HOME | End: 2024-12-30
Payer: COMMERCIAL

## 2024-12-30 DIAGNOSIS — Z12.31 ENCOUNTER FOR SCREENING MAMMOGRAM FOR BREAST CANCER: ICD-10-CM

## 2024-12-30 PROCEDURE — 77067 SCR MAMMO BI INCL CAD: CPT

## 2024-12-30 PROCEDURE — 77063 BREAST TOMOSYNTHESIS BI: CPT

## 2024-12-31 ENCOUNTER — HOSPITAL ENCOUNTER (OUTPATIENT)
Dept: RADIOLOGY | Facility: HOSPITAL | Age: 59
Discharge: HOME | End: 2024-12-31

## 2025-01-02 ENCOUNTER — RESULTS FOLLOW-UP (OUTPATIENT)
Dept: FAMILY MEDICINE CLINIC | Facility: CLINIC | Age: 60
End: 2025-01-02

## 2025-01-08 ENCOUNTER — OFFICE VISIT (OUTPATIENT)
Dept: FAMILY MEDICINE CLINIC | Facility: CLINIC | Age: 60
End: 2025-01-08
Payer: COMMERCIAL

## 2025-01-08 VITALS
HEART RATE: 98 BPM | SYSTOLIC BLOOD PRESSURE: 128 MMHG | HEIGHT: 65 IN | WEIGHT: 209 LBS | BODY MASS INDEX: 34.82 KG/M2 | TEMPERATURE: 99.3 F | DIASTOLIC BLOOD PRESSURE: 80 MMHG | RESPIRATION RATE: 18 BRPM

## 2025-01-08 DIAGNOSIS — J06.9 ACUTE UPPER RESPIRATORY INFECTION: Primary | ICD-10-CM

## 2025-01-08 LAB
SARS-COV-2 AG UPPER RESP QL IA: NEGATIVE
SL AMB POCT RAPID FLU A: NEGATIVE
SL AMB POCT RAPID FLU B: NEGATIVE
VALID CONTROL: NORMAL

## 2025-01-08 PROCEDURE — 87804 INFLUENZA ASSAY W/OPTIC: CPT | Performed by: NURSE PRACTITIONER

## 2025-01-08 PROCEDURE — 87811 SARS-COV-2 COVID19 W/OPTIC: CPT | Performed by: NURSE PRACTITIONER

## 2025-01-08 PROCEDURE — 99213 OFFICE O/P EST LOW 20 MIN: CPT | Performed by: NURSE PRACTITIONER

## 2025-01-08 RX ORDER — BENZONATATE 200 MG/1
200 CAPSULE ORAL 2 TIMES DAILY PRN
Qty: 20 CAPSULE | Refills: 0 | Status: SHIPPED | OUTPATIENT
Start: 2025-01-08

## 2025-01-08 NOTE — PROGRESS NOTES
"Name: Yuli Donis      : 1965      MRN: [id number]  Encounter Provider: NONA Guevara  Encounter Date: 2025   Encounter department: [facility]  :  Assessment & Plan  Acute upper respiratory infection  Reviewed symptomatic treatment of viral illness.  Recommended saline sinus rinses, Flonase, and Mucinex prn.     Orders:  •  POCT rapid flu A and B  •  POCT Rapid Covid Ag  •  benzonatate (TESSALON) 200 MG capsule; Take 1 capsule (200 mg total) by mouth 2 (two) times a day as needed for cough    Recent Results (from the past 24 hours)   POCT rapid flu A and B    Collection Time: 25  8:59 AM   Result Value Ref Range    RAPID FLU A Negative     RAPID FLU B Negative    POCT Rapid Covid Ag    Collection Time: 25  8:59 AM   Result Value Ref Range    POCT SARS-CoV-2 Ag Negative Negative    VALID CONTROL Valid               History of Present Illness     She has been sick since yesterday with cold symptoms.  She has congestion, postnasal drip and a cough.  No associated fevers or breathing difficulties.  She did have a flu shot this season      Review of Systems   Constitutional:  Positive for fatigue. Negative for chills and fever.   HENT:  Positive for congestion and postnasal drip. Negative for ear pain, rhinorrhea, sinus pressure and sore throat.    Respiratory:  Positive for cough. Negative for shortness of breath and wheezing.    Cardiovascular:  Negative for chest pain.   Gastrointestinal:  Negative for abdominal pain, diarrhea, nausea and vomiting.   Musculoskeletal:  Negative for arthralgias.   Skin:  Negative for rash.   Neurological:  Negative for headaches.       Objective   /80   Pulse 98   Temp 99.3 °F (37.4 °C)   Resp 18   Ht 5' 4.5\" (1.638 m)   Wt 94.8 kg (209 lb)   BMI 35.32 kg/m²      Physical Exam  Vitals and nursing note reviewed.   Constitutional:       General: She is not in acute distress.     Appearance: Normal appearance.   HENT:      Head: " Normocephalic and atraumatic.   Eyes:      Conjunctiva/sclera: Conjunctivae normal.   Neck:      Vascular: No carotid bruit.   Cardiovascular:      Rate and Rhythm: Normal rate and regular rhythm.      Pulses: Normal pulses.      Heart sounds: Normal heart sounds. No murmur heard.  Pulmonary:      Effort: Pulmonary effort is normal.      Breath sounds: Normal breath sounds.   Skin:     General: Skin is warm and dry.   Neurological:      Mental Status: She is alert.   Psychiatric:         Mood and Affect: Mood normal.         Behavior: Behavior normal.

## 2025-01-08 NOTE — LETTER
January 8, 2025     Patient: Yuli Donis  YOB: 1965  Date of Visit: 1/8/2025      To Whom it May Concern:    Yuli Donis is under my professional care. Yuli was seen in my office on 1/8/2025.     If you have any questions or concerns, please don't hesitate to call.         Sincerely,          NONA Guevara        CC: No Recipients

## 2025-01-10 ENCOUNTER — ANESTHESIA (OUTPATIENT)
Dept: ANESTHESIOLOGY | Facility: HOSPITAL | Age: 60
End: 2025-01-10

## 2025-01-10 ENCOUNTER — ANESTHESIA EVENT (OUTPATIENT)
Dept: ANESTHESIOLOGY | Facility: HOSPITAL | Age: 60
End: 2025-01-10

## 2025-01-10 ENCOUNTER — TELEPHONE (OUTPATIENT)
Dept: FAMILY MEDICINE CLINIC | Facility: CLINIC | Age: 60
End: 2025-01-10

## 2025-01-10 NOTE — TELEPHONE ENCOUNTER
Venkata Irene,      Would you please give me a call today, I have a question to ask you.      Thank you,      Yuli Donis

## 2025-01-10 NOTE — TELEPHONE ENCOUNTER
1/10/2025 10:02 AM spoke with Yuli     I explained that a viral infection would not be helped by an antibiotic.  She needs to give it some time to improve.    Sienna Irene, DO

## 2025-01-10 NOTE — TELEPHONE ENCOUNTER
1/10/2025 9:45 AM Called patient as she requested    Left message on her voicemail to call the office.   Sienna Irene, DO

## 2025-01-15 ENCOUNTER — TELEPHONE (OUTPATIENT)
Age: 60
End: 2025-01-15

## 2025-01-15 NOTE — TELEPHONE ENCOUNTER
Patients GI provider:  Dr. Valdivia    Number to return call: [phone]    Reason for call: Pt calling to speak with Kaylie Salcedo. She was speak with her this morning and requested a message be sent to have  reach out but now she is asking to speak directly with Kaylie again. She did not want to go into detail on why she wishes another call just that it is about the previous conversation, please have Kaylie call Pt when possible.

## 2025-01-21 ENCOUNTER — TELEPHONE (OUTPATIENT)
Age: 60
End: 2025-01-21

## 2025-01-21 NOTE — TELEPHONE ENCOUNTER
Pt r/s her procedure from 1/24/25 to 6/6/25 with Dr Valdivia at the [facility]

## 2025-01-21 NOTE — TELEPHONE ENCOUNTER
Patient called requesting to speak with . Unable to reach . Patient would like a call back as soon as possible in regards to a bill that was received. 1-8-2025

## 2025-01-21 NOTE — TELEPHONE ENCOUNTER
Patient called in to r/s colonoscopy from 1/24/2025 at the [facility] to 6/6/2025 at [facility] with Dr. Valdivia. Patient than asked to switch the date and  r/s for 1/24/2025 at [facility]. Patient may call back to r/s procedure after speaking with insurance.

## 2025-01-28 NOTE — TELEPHONE ENCOUNTER
"Called patient and advised her to contact her insurance company to clarify why they are not covering the entire visit.  On my end it shows on the EOB that it is a \"copayment\" that is due. I did explain that if she has a deductible that it restarts at the beginning of each year and they could be applying it towards her deductible.  She understood and will contact her insurance company.  If she requires any further assistance she will contact me.   "

## 2025-02-24 ENCOUNTER — OFFICE VISIT (OUTPATIENT)
Dept: UROLOGY | Facility: CLINIC | Age: 60
End: 2025-02-24
Payer: COMMERCIAL

## 2025-02-24 VITALS
HEART RATE: 68 BPM | HEIGHT: 65 IN | WEIGHT: 216 LBS | DIASTOLIC BLOOD PRESSURE: 100 MMHG | OXYGEN SATURATION: 97 % | BODY MASS INDEX: 35.99 KG/M2 | SYSTOLIC BLOOD PRESSURE: 140 MMHG

## 2025-02-24 DIAGNOSIS — N20.0 CALCULUS OF KIDNEY: Primary | ICD-10-CM

## 2025-02-24 PROCEDURE — 99203 OFFICE O/P NEW LOW 30 MIN: CPT | Performed by: PHYSICIAN ASSISTANT

## 2025-02-25 NOTE — PROGRESS NOTES
"    UROLOGY CONSULTATION NOTE       Patient Identifiers: Yuli Donis (MRN: [id number])  Service Requesting Consultation:       Sienna Irene DO       Service Providing Consultation:  Urology, Juliano Jane PA-C  Consults  Date of Service: 2025    Reason for Consultation: Kidney stone follow-up    History of Present Illness:     Yuli Donis is a 59 y.o. female with history of kidney stones.  Not seen since  she had a cystoscopy and laser lithotripsy in .  No UTI or incontinence.  No family history of kidney stones.  She reports some twinges in her back and \"wanted to get checked\".  Otherwise not having any other problems.    Past Medical, Past Surgical History:     Past Medical History:   Diagnosis Date    Cancer (HCC)     L breast-lumpectomy    Contact lens/glasses fitting     History of chemotherapy     History of depression     History of radiation therapy     Hypertension     Kidney stone     Malignant neoplasm of breast (HCC)     left    Obesity (BMI 30-39.9)     Premenopausal menorrhagia 3/28/2014   :    Past Surgical History:   Procedure Laterality Date    BREAST BIOPSY      BREAST LUMPECTOMY Left      SECTION      x2    COLONOSCOPY      ENDOMETRIAL ABLATION      FL RETROGRADE PYELOGRAM  2020    INTRAOPERATIVE RADIATION THERAPY (IORT)      last assessed: 7/17/15    NH COLONOSCOPY FLX DX W/COLLJ SPEC WHEN PFRMD N/A 3/22/2019    Procedure: COLONOSCOPY;  Surgeon: Monika Valdivia MD;  Location: [facility] GI LAB;  Service: Gastroenterology    NH CYSTO/URETERO W/LITHOTRIPSY &INDWELL STENT INSRT Left 2020    Procedure: CYSTOSCOPY URETEROSCOPY WITH LITHOTRIPSY HIGH POWERED LASER, RETROGRADE PYELOGRAM, BASKET STONE EXTRACTION, AND INSERTION STENT URETERAL;  Surgeon: Alfonso Beckford MD;  Location: [facility] MAIN OR;  Service: Urology   :    Medications, Allergies:     Current Outpatient Medications:     acetaminophen (TYLENOL) 325 mg tablet, Take 650 mg by mouth every 6 " (six) hours as needed for mild pain, Disp: , Rfl:     benzonatate (TESSALON) 200 MG capsule, Take 1 capsule (200 mg total) by mouth 2 (two) times a day as needed for cough, Disp: 20 capsule, Rfl: 0    COLLAGEN-VITAMIN C-BIOTIN PO, Take by mouth in the morning, Disp: , Rfl:     cyanocobalamin (VITAMIN B-12) 100 mcg tablet, Take 200 mcg by mouth daily, Disp: , Rfl:     cyclobenzaprine (FLEXERIL) 5 mg tablet, Take 1 tablet (5 mg total) by mouth daily at bedtime as needed for muscle spasms, Disp: 20 tablet, Rfl: 1    EPINEPHrine (EPIPEN) 0.3 mg/0.3 mL SOAJ, Inject into a muscle once PRN, Disp: , Rfl:     hydroCHLOROthiazide 25 mg tablet, Take 1 tablet (25 mg total) by mouth daily DX: I10, Disp: 90 tablet, Rfl: 1    loratadine (CLARITIN) 10 mg tablet, Take 10 mg by mouth if needed, Disp: , Rfl:     Multiple Vitamins-Minerals (CENTRUM SILVER 50+WOMEN PO), Take by mouth in the morning, Disp: , Rfl:     Phentermine-Topiramate (Qsymia) 11.25-69 MG CP24, Take 1 capsule by mouth daily before breakfast, Disp: 30 capsule, Rfl: 3    Allergies:  Allergies   Allergen Reactions    Percocet [Oxycodone-Acetaminophen] Rash    Percolone [Oxycodone] Rash    Shellfish-Derived Products - Food Allergy Rash    Pollen Extract Cough   :    Social and Family History:   Social History:   Social History     Tobacco Use    Smoking status: Never     Passive exposure: Never    Smokeless tobacco: Never   Vaping Use    Vaping status: Never Used   Substance Use Topics    Alcohol use: Yes     Comment: socially    Drug use: No   .    Social History     Tobacco Use   Smoking Status Never    Passive exposure: Never   Smokeless Tobacco Never       Family History:  Family History   Problem Relation Age of Onset    Hypertension Mother     Heart disease Mother         CHF-related to smoking    ADD / ADHD Daughter     Hypertension Family     No Known Problems Maternal Aunt     No Known Problems Maternal Aunt     No Known Problems Paternal Aunt     Diabetes Neg  "Hx     Stroke Neg Hx     Thyroid disease Neg Hx    :     Review of Systems:     General: Fever, chills, or night sweats: negative  Cardiac: Negative for chest pain.    Pulmonary: Negative for shortness of breath.  Gastrointestinal: Abdominal pain negative.  Nausea, vomiting, or diarrhea negative,  Genitourinary: See HPI above.  Patient does not have hematuria.  All other systems queried were negative.    Physical Exam:   General: Patient is pleasant and in NAD. Awake and alert  /100 (BP Location: Left arm, Patient Position: Sitting, Cuff Size: Standard)   Pulse 68   Ht 5' 4.5\" (1.638 m)   Wt 98 kg (216 lb)   SpO2 97%   BMI 36.50 kg/m²   HEENT:  Conjunctiva are clear  Constitutional:  pleasant and cooperative     no apparent distress  Cardiac: Peripheral edema: negative  Pulmonary: Non-labored breathing  Abdomen: Soft, non-tender, non-distended.  No surgical scars.  No masses, tenderness, hernias noted.    Genitourinary: Negative CVA tenderness, negative suprapubic tenderness.  Extremities:  Moves all extremities  Neurological:CNII-XII intact. No numbness or tingling. Essentially non focal neurologic exam  Psychiatric:mood affect and behavior normal        Labs:     Lab Results   Component Value Date    HGB 12.4 12/17/2024    HCT 38.1 12/17/2024    WBC 5.9 12/17/2024     12/17/2024   ]    Lab Results   Component Value Date     11/12/2016    K 4.0 12/17/2024     12/17/2024    CO2 26 12/17/2024    BUN 20 12/17/2024    CREATININE 0.88 12/17/2024    CALCIUM 9.2 11/12/2016    GLUCOSE 86 11/12/2016   ]    Imaging:   I personally reviewed the images and report of the following studies, and reviewed them with the patient:  No recent imaging      ASSESSMENT:     #1.  History of kidney stones    PLAN:   -Discussed strategies of water consumption and stone prevention  -She will get an ultrasound and a KUB and we can call with any concerning findings otherwise she can follow-up with urology on an " as-needed basis      Thank you for allowing me to participate in this patients’ care.  Please do not hesitate to call with any additional questions.  Juliano Jane PA-C

## 2025-02-27 DIAGNOSIS — E66.812 OBESITY, CLASS II, BMI 35-39.9: ICD-10-CM

## 2025-02-28 ENCOUNTER — PATIENT MESSAGE (OUTPATIENT)
Dept: FAMILY MEDICINE CLINIC | Facility: CLINIC | Age: 60
End: 2025-02-28

## 2025-02-28 RX ORDER — PHENTERMINE AND TOPIRAMATE 11.25; 69 MG/1; MG/1
1 CAPSULE, EXTENDED RELEASE ORAL
Qty: 30 CAPSULE | Refills: 3 | Status: SHIPPED | OUTPATIENT
Start: 2025-02-28

## 2025-04-16 DIAGNOSIS — I10 ESSENTIAL HYPERTENSION: ICD-10-CM

## 2025-04-17 RX ORDER — HYDROCHLOROTHIAZIDE 25 MG/1
25 TABLET ORAL DAILY
Qty: 90 TABLET | Refills: 1 | Status: SHIPPED | OUTPATIENT
Start: 2025-04-17

## 2025-04-29 ENCOUNTER — TELEPHONE (OUTPATIENT)
Dept: FAMILY MEDICINE CLINIC | Facility: CLINIC | Age: 60
End: 2025-04-29

## 2025-04-29 NOTE — TELEPHONE ENCOUNTER
4/29/2025 12:57 PM returned call to Yuli     She wanted to let me know that she is between insurances.  Her new insurance will kick in in May.    I recommended that she schedule a blood pressure check.     Sienna Irene

## 2025-04-29 NOTE — TELEPHONE ENCOUNTER
Hi Dr. Irene,      Would you please give me call today, I need to speak with  you with you directly and it's urgent.      I look forward speaking with you today.      Thank you,   Yuli Donis

## 2025-05-01 NOTE — TELEPHONE ENCOUNTER
4/29/2024 11:54 AM returned call to Yuli     She wanted to know what tetanus shot I recommended so she can check with insurance for coverage      Prescription e-prescribed  Sienna Irene, DO      PROVIDER:[TOKEN:[[id number]],FOLLOWUP:[1 week],ESTABLISHEDPATIENT:[T]]

## 2025-05-05 DIAGNOSIS — J06.9 ACUTE UPPER RESPIRATORY INFECTION: ICD-10-CM

## 2025-05-05 RX ORDER — BENZONATATE 200 MG/1
200 CAPSULE ORAL 2 TIMES DAILY PRN
Qty: 20 CAPSULE | Refills: 0 | Status: SHIPPED | OUTPATIENT
Start: 2025-05-05

## 2025-05-06 DIAGNOSIS — J01.00 ACUTE MAXILLARY SINUSITIS, RECURRENCE NOT SPECIFIED: ICD-10-CM

## 2025-05-07 RX ORDER — AMOXICILLIN 875 MG/1
875 TABLET, COATED ORAL 2 TIMES DAILY
Qty: 14 TABLET | Refills: 0 | OUTPATIENT
Start: 2025-05-07 | End: 2025-05-14

## 2025-05-20 ENCOUNTER — TELEPHONE (OUTPATIENT)
Age: 60
End: 2025-05-20

## 2025-05-20 NOTE — TELEPHONE ENCOUNTER
Called pt in regards to mychart message. Pt unable to talk at this time and will call when convenient for her

## 2025-05-23 ENCOUNTER — ANESTHESIA (OUTPATIENT)
Dept: ANESTHESIOLOGY | Facility: HOSPITAL | Age: 60
End: 2025-05-23

## 2025-05-23 ENCOUNTER — ANESTHESIA EVENT (OUTPATIENT)
Dept: ANESTHESIOLOGY | Facility: HOSPITAL | Age: 60
End: 2025-05-23

## 2025-06-03 ENCOUNTER — TELEPHONE (OUTPATIENT)
Age: 60
End: 2025-06-03

## 2025-06-03 NOTE — TELEPHONE ENCOUNTER
Patient has sent numerous message asking about foods that she can and cannot eat prior to the colonoscopy.  I called patient and left her a message stating that if she has to question whether she should eat it or not prior to the colonoscopy then she should err on the side of caution and not eat it 5 days prior to the procedure.

## 2025-06-05 ENCOUNTER — RA CDI HCC (OUTPATIENT)
Dept: OTHER | Facility: HOSPITAL | Age: 60
End: 2025-06-05

## 2025-06-06 ENCOUNTER — HOSPITAL ENCOUNTER (OUTPATIENT)
Dept: GASTROENTEROLOGY | Facility: AMBULARY SURGERY CENTER | Age: 60
Setting detail: OUTPATIENT SURGERY
End: 2025-06-06
Attending: INTERNAL MEDICINE
Payer: COMMERCIAL

## 2025-06-06 ENCOUNTER — ANESTHESIA (OUTPATIENT)
Dept: GASTROENTEROLOGY | Facility: AMBULARY SURGERY CENTER | Age: 60
End: 2025-06-06
Payer: COMMERCIAL

## 2025-06-06 VITALS
BODY MASS INDEX: 34.82 KG/M2 | SYSTOLIC BLOOD PRESSURE: 153 MMHG | WEIGHT: 209 LBS | HEART RATE: 63 BPM | HEIGHT: 65 IN | TEMPERATURE: 97.3 F | OXYGEN SATURATION: 100 % | RESPIRATION RATE: 20 BRPM | DIASTOLIC BLOOD PRESSURE: 80 MMHG

## 2025-06-06 DIAGNOSIS — Z12.11 SCREENING FOR COLON CANCER: ICD-10-CM

## 2025-06-06 PROCEDURE — 88305 TISSUE EXAM BY PATHOLOGIST: CPT | Performed by: PATHOLOGY

## 2025-06-06 PROCEDURE — 45385 COLONOSCOPY W/LESION REMOVAL: CPT | Performed by: INTERNAL MEDICINE

## 2025-06-06 RX ORDER — SODIUM CHLORIDE, SODIUM LACTATE, POTASSIUM CHLORIDE, CALCIUM CHLORIDE 600; 310; 30; 20 MG/100ML; MG/100ML; MG/100ML; MG/100ML
INJECTION, SOLUTION INTRAVENOUS CONTINUOUS PRN
Status: DISCONTINUED | OUTPATIENT
Start: 2025-06-06 | End: 2025-06-06

## 2025-06-06 RX ORDER — PROPOFOL 10 MG/ML
INJECTION, EMULSION INTRAVENOUS CONTINUOUS PRN
Status: DISCONTINUED | OUTPATIENT
Start: 2025-06-06 | End: 2025-06-06

## 2025-06-06 RX ORDER — PROPOFOL 10 MG/ML
INJECTION, EMULSION INTRAVENOUS AS NEEDED
Status: DISCONTINUED | OUTPATIENT
Start: 2025-06-06 | End: 2025-06-06

## 2025-06-06 RX ADMIN — PROPOFOL 100 MCG/KG/MIN: 10 INJECTION, EMULSION INTRAVENOUS at 12:12

## 2025-06-06 RX ADMIN — SODIUM CHLORIDE, SODIUM LACTATE, POTASSIUM CHLORIDE, AND CALCIUM CHLORIDE: .6; .31; .03; .02 INJECTION, SOLUTION INTRAVENOUS at 11:59

## 2025-06-06 RX ADMIN — Medication 40 MG: at 12:22

## 2025-06-06 RX ADMIN — PROPOFOL 140 MG: 10 INJECTION, EMULSION INTRAVENOUS at 12:11

## 2025-06-06 NOTE — ANESTHESIA PREPROCEDURE EVALUATION
Procedure:  COLONOSCOPY    Relevant Problems   CARDIO   (+) Essential hypertension      /RENAL   (+) Renal calculi      NPO appropriate  METS >4    Physical Exam    Airway     Mallampati score: II          Cardiovascular      Dental   No notable dental hx     Pulmonary      Neurological      Other Findings  post-pubertal.      Anesthesia Plan  ASA Score- 2     Anesthesia Type- IV sedation with anesthesia with ASA Monitors.         Additional Monitors:     Airway Plan: natural airway.           Plan Factors-Exercise tolerance (METS): >4 METS.    Chart reviewed.    Patient summary reviewed.                  Induction- intravenous.    Postoperative Plan- .   Monitoring Plan - Monitoring plan - standard ASA monitoring  Post Operative Pain Plan - non-opiod analgesics        Informed Consent- Anesthetic plan and risks discussed with patient.  I personally reviewed this patient with the CRNA. Discussed and agreed on the Anesthesia Plan with the CRNA..      NPO Status:  Vitals Value Taken Time   Date of last liquid 06/06/25 06/06/25 11:32   Time of last liquid 0530 06/06/25 11:32   Date of last solid 06/04/25 06/06/25 11:32   Time of last solid 1800 06/06/25 11:32

## 2025-06-06 NOTE — ANESTHESIA POSTPROCEDURE EVALUATION
Post-Op Assessment Note    CV Status:  Stable  Pain Score: 0    Pain management: adequate       Mental Status:  Sleepy and arousable   PONV Controlled:  None   Airway Patency:  Patent     Post Op Vitals Reviewed: Yes    No anethesia notable event occurred.    Staff: CRNA           Last Filed PACU Vitals:  Vitals Value Taken Time   Temp 97    Pulse 87    /75    Resp 12    SpO2 100

## 2025-06-06 NOTE — H&P
"History and Physical -  [facility]  Yuli Donis 59 y.o. female MRN: [id number]    HPI: Yuli Donis is a 59 y.o. year old female who presents for colon cancer screening evaluation.      Review of Systems    Historical Information   Past Medical History[1]  Past Surgical History[2]  Social History   Social History     Substance and Sexual Activity   Alcohol Use Yes    Comment: socially     Social History     Substance and Sexual Activity   Drug Use No     Tobacco Use History[3]  Family History[4]    Meds/Allergies     Not in a hospital admission.    Allergies[5]    Objective     /78   Pulse 70   Temp (!) 97 °F (36.1 °C) (Temporal)   Resp 18   Ht 5' 5\" (1.651 m)   Wt 94.8 kg (209 lb)   SpO2 99%   BMI 34.78 kg/m²       PHYSICAL EXAM    Gen: NAD  CV: RRR  CHEST: Clear  ABD: soft, NT/ND  EXT: no edema  Neuro: AAO      ASSESSMENT/PLAN:  This is a 59 y.o. year old female here for colon cancer screening evaluation.  Patient was explained about the risks and benefits of the procedure. Risks including but not limited to bleeding, infection, perforation were explained in detail.    PLAN:   Procedure: Colonoscopy.           [1]   Past Medical History:  Diagnosis Date    Cancer (HCC)     L breast-lumpectomy    Contact lens/glasses fitting     History of chemotherapy     History of depression     History of radiation therapy     Hypertension     Kidney stone     Malignant neoplasm of breast (HCC) 2006    left    Obesity (BMI 30-39.9)     Premenopausal menorrhagia 3/28/2014   [2]   Past Surgical History:  Procedure Laterality Date    BREAST BIOPSY      BREAST LUMPECTOMY Left 2006     SECTION      x2    COLONOSCOPY      ENDOMETRIAL ABLATION      FL RETROGRADE PYELOGRAM  2020    INTRAOPERATIVE RADIATION THERAPY (IORT)      last assessed: 7/17/15    MD COLONOSCOPY FLX DX W/COLLJ SPEC WHEN PFRMD N/A 3/22/2019    Procedure: COLONOSCOPY;  Surgeon: Monika Valdivia MD;  Location: [facility] GI " LAB;  Service: Gastroenterology    WA CYSTO/URETERO W/LITHOTRIPSY &INDWELL STENT INSRT Left 7/22/2020    Procedure: CYSTOSCOPY URETEROSCOPY WITH LITHOTRIPSY HIGH POWERED LASER, RETROGRADE PYELOGRAM, BASKET STONE EXTRACTION, AND INSERTION STENT URETERAL;  Surgeon: Alfonso Beckford MD;  Location[facility];  Service: Urology   [3]   Social History  Tobacco Use   Smoking Status Never    Passive exposure: Never   Smokeless Tobacco Never   [4]   Family History  Problem Relation Name Age of Onset    Hypertension Mother      Heart disease Mother          CHF-related to smoking    ADD / ADHD Daughter      Hypertension Family      No Known Problems Maternal Aunt      No Known Problems Maternal Aunt      No Known Problems Paternal Aunt      Diabetes Neg Hx      Stroke Neg Hx      Thyroid disease Neg Hx     [5]   Allergies  Allergen Reactions    Percocet [Oxycodone-Acetaminophen] Rash    Percolone [Oxycodone] Rash    Shellfish-Derived Products - Food Allergy Rash    Pollen Extract Cough

## 2025-06-12 PROCEDURE — 88305 TISSUE EXAM BY PATHOLOGIST: CPT | Performed by: PATHOLOGY

## 2025-06-18 ENCOUNTER — OFFICE VISIT (OUTPATIENT)
Dept: FAMILY MEDICINE CLINIC | Facility: CLINIC | Age: 60
End: 2025-06-18
Payer: COMMERCIAL

## 2025-06-18 ENCOUNTER — RESULTS FOLLOW-UP (OUTPATIENT)
Age: 60
End: 2025-06-18

## 2025-06-18 VITALS
TEMPERATURE: 96.6 F | DIASTOLIC BLOOD PRESSURE: 72 MMHG | OXYGEN SATURATION: 98 % | BODY MASS INDEX: 34.82 KG/M2 | WEIGHT: 209 LBS | RESPIRATION RATE: 16 BRPM | SYSTOLIC BLOOD PRESSURE: 122 MMHG | HEIGHT: 65 IN | HEART RATE: 76 BPM

## 2025-06-18 DIAGNOSIS — Z12.31 SCREENING MAMMOGRAM FOR BREAST CANCER: ICD-10-CM

## 2025-06-18 DIAGNOSIS — Z00.00 ANNUAL PHYSICAL EXAM: Primary | ICD-10-CM

## 2025-06-18 DIAGNOSIS — M54.2 CERVICALGIA: ICD-10-CM

## 2025-06-18 DIAGNOSIS — Z23 ENCOUNTER FOR IMMUNIZATION: ICD-10-CM

## 2025-06-18 PROCEDURE — 90471 IMMUNIZATION ADMIN: CPT

## 2025-06-18 PROCEDURE — 99396 PREV VISIT EST AGE 40-64: CPT | Performed by: NURSE PRACTITIONER

## 2025-06-18 PROCEDURE — 90715 TDAP VACCINE 7 YRS/> IM: CPT

## 2025-06-18 NOTE — PROGRESS NOTES
Adult Annual Physical  Name: Yuli Donis      : 1965      MRN: [id number]  Encounter Provider: NONA Guevara  Encounter Date: 2025   Encounter department: [facility]    :  Assessment & Plan  Annual physical exam  She may need updated TB screening, she will check with her employer and schedule appt if necessary        Encounter for immunization    Orders:    TDAP VACCINE GREATER THAN OR EQUAL TO 6YO IM    Screening mammogram for breast cancer    Orders:    Mammo screening bilateral w 3d and cad; Future    Cervicalgia  Advised heating pad, ROM exercises, massage therapy.  Naproxen BID short term.            Preventive Screenings:  - Diabetes Screening: screening up-to-date  - Cholesterol Screening: screening up-to-date   - Hepatitis C screening: screening up-to-date   - Cervical cancer screening: screening up-to-date   - Breast cancer screening: has history of breast cancer   - Colon cancer screening: screening up-to-date   - Lung cancer screening: screening not indicated     Immunizations:  - Immunizations due: Tdap, Zoster (Shingrix) and declines Shingrix today    Counseling/Anticipatory Guidance:    - Diet: discussed recommendations for a healthy/well-balanced diet.   - Exercise: the importance of regular exercise/physical activity was discussed. Recommend exercise 3-5 times per week for at least 30 minutes.       Depression Screening and Follow-up Plan: Patient was screened for depression during today's encounter. They screened negative with a PHQ-2 score of 0.          History of Present Illness     Adult Annual Physical:  Patient presents for annual physical. Here today for CPE for employment.   Has some tension in her neck that has been bothering her, affecting left side.  She sleeps on that side as well.     Diet and Physical Activity:  - Diet/Nutrition: no special diet and consuming 3-5 servings of fruits/vegetables daily.  - Exercise: moderate cardiovascular exercise,  "3-4 times a week on average and 1-2 hours on average.    Depression Screening:  - PHQ-2 Score: 0    General Health:  - Sleep: sleeps well and 4-6 hours of sleep on average.  - Hearing: normal hearing bilateral ears.  - Vision: no vision problems and most recent eye exam > 1 year ago.  - Dental: regular dental visits, brushes teeth once daily and does not floss.    /GYN Health:  - Follows with GYN: yes.   - Menopause: postmenopausal.   - History of STDs: no  - Contraception: menopause.      Advanced Care Planning:  - Has an advanced directive?: yes    - Has a durable medical POA?: no      Review of Systems   Constitutional: Negative.    Respiratory: Negative.     Cardiovascular: Negative.    Gastrointestinal: Negative.    Musculoskeletal:  Positive for neck pain.   Neurological: Negative.      Medications Ordered Prior to Encounter[1]   Social History[2]    Objective   /72   Pulse 76   Temp (!) 96.6 °F (35.9 °C)   Resp 16   Ht 5' 5\" (1.651 m)   Wt 94.8 kg (209 lb)   SpO2 98%   BMI 34.78 kg/m²     Physical Exam  Vitals and nursing note reviewed.   Constitutional:       Appearance: Normal appearance. She is well-developed.   HENT:      Head: Normocephalic and atraumatic.      Right Ear: Tympanic membrane and external ear normal.      Left Ear: Tympanic membrane and external ear normal.      Nose: Nose normal.      Mouth/Throat:      Pharynx: Oropharynx is clear.     Eyes:      Extraocular Movements: Extraocular movements intact.      Conjunctiva/sclera: Conjunctivae normal.      Pupils: Pupils are equal, round, and reactive to light.     Neck:      Thyroid: No thyromegaly.      Vascular: No carotid bruit.     Cardiovascular:      Rate and Rhythm: Normal rate and regular rhythm.      Pulses: Normal pulses.      Heart sounds: Normal heart sounds. No murmur heard.  Pulmonary:      Effort: Pulmonary effort is normal.      Breath sounds: Normal breath sounds.   Abdominal:      General: Bowel sounds are " normal. There is no distension.      Palpations: Abdomen is soft.      Tenderness: There is no abdominal tenderness.     Musculoskeletal:         General: No deformity. Normal range of motion.      Cervical back: Normal, normal range of motion and neck supple.   Lymphadenopathy:      Cervical: No cervical adenopathy.     Skin:     General: Skin is warm and dry.      Capillary Refill: Capillary refill takes less than 2 seconds.     Neurological:      Mental Status: She is alert.      Sensory: No sensory deficit.      Motor: No abnormal muscle tone.      Coordination: Coordination normal.      Deep Tendon Reflexes: Reflexes normal.     Psychiatric:         Mood and Affect: Mood normal.         Behavior: Behavior normal.                [1]   Current Outpatient Medications on File Prior to Visit   Medication Sig Dispense Refill    acetaminophen (TYLENOL) 325 mg tablet Take 650 mg by mouth every 6 (six) hours as needed for mild pain      COLLAGEN-VITAMIN C-BIOTIN PO Take by mouth in the morning      cyanocobalamin (VITAMIN B-12) 100 mcg tablet Take 200 mcg by mouth in the morning.      cyclobenzaprine (FLEXERIL) 5 mg tablet Take 1 tablet (5 mg total) by mouth daily at bedtime as needed for muscle spasms 20 tablet 1    EPINEPHrine (EPIPEN) 0.3 mg/0.3 mL SOAJ Inject into a muscle once PRN      hydroCHLOROthiazide 25 mg tablet TAKE 1 TABLET BY MOUTH EVERY DAY 90 tablet 1    loratadine (CLARITIN) 10 mg tablet Take 10 mg by mouth if needed      Multiple Vitamins-Minerals (CENTRUM SILVER 50+WOMEN PO) Take by mouth in the morning      Qsymia 11.25-69 MG CP24 TAKE 1 CAPSULE BY MOUTH EVERY DAY BEFORE BREAKFAST 30 capsule 3     No current facility-administered medications on file prior to visit.   [2]   Social History  Tobacco Use    Smoking status: Never     Passive exposure: Never    Smokeless tobacco: Never   Vaping Use    Vaping status: Never Used   Substance and Sexual Activity    Alcohol use: Yes     Comment: socially     Drug use: No

## 2025-06-18 NOTE — PATIENT INSTRUCTIONS
"Patient Education     Routine physical for adults   The Basics   Written by the doctors and editors at [facility]   What is a physical? -- A physical is a routine visit, or \"check-up,\" with your doctor. You might also hear it called a \"wellness visit\" or \"preventive visit.\"  During each visit, the doctor will:   Ask about your physical and mental health   Ask about your habits, behaviors, and lifestyle   Do an exam   Give you vaccines if needed   Talk to you about any medicines you take   Give advice about your health   Answer your questions  Getting regular check-ups is an important part of taking care of your health. It can help your doctor find and treat any problems you have. But it's also important for preventing health problems.  A routine physical is different from a \"sick visit.\" A sick visit is when you see a doctor because of a health concern or problem. Since physicals are scheduled ahead of time, you can think about what you want to ask the doctor.  How often should I get a physical? -- It depends on your age and health. In general, for people age 21 years and older:   If you are younger than 50 years, you might be able to get a physical every 3 years.   If you are 50 years or older, your doctor might recommend a physical every year.  If you have an ongoing health condition, like diabetes or high blood pressure, your doctor will probably want to see you more often.  What happens during a physical? -- In general, each visit will include:   Physical exam - The doctor or nurse will check your height, weight, heart rate, and blood pressure. They will also look at your eyes and ears. They will ask about how you are feeling and whether you have any symptoms that bother you.   Medicines - It's a good idea to bring a list of all the medicines you take to each doctor visit. Your doctor will talk to you about your medicines and answer any questions. Tell them if you are having any side effects that bother you. You " "should also tell them if you are having trouble paying for any of your medicines.   Habits and behaviors - This includes:   Your diet   Your exercise habits   Whether you smoke, drink alcohol, or use drugs   Whether you are sexually active   Whether you feel safe at home  Your doctor will talk to you about things you can do to improve your health and lower your risk of health problems. They will also offer help and support. For example, if you want to quit smoking, they can give you advice and might prescribe medicines. If you want to improve your diet or get more physical activity, they can help you with this, too.   Lab tests, if needed - The tests you get will depend on your age and situation. For example, your doctor might want to check your:   Cholesterol   Blood sugar   Iron level   Vaccines - The recommended vaccines will depend on your age, health, and what vaccines you already had. Vaccines are very important because they can prevent certain serious or deadly infections.   Discussion of screening - \"Screening\" means checking for diseases or other health problems before they cause symptoms. Your doctor can recommend screening based on your age, risk, and preferences. This might include tests to check for:   Cancer, such as breast, prostate, cervical, ovarian, colorectal, prostate, lung, or skin cancer   Sexually transmitted infections, such as chlamydia and gonorrhea   Mental health conditions like depression and anxiety  Your doctor will talk to you about the different types of screening tests. They can help you decide which screenings to have. They can also explain what the results might mean.   Answering questions - The physical is a good time to ask the doctor or nurse questions about your health. If needed, they can refer you to other doctors or specialists, too.  Adults older than 65 years often need other care, too. As you get older, your doctor will talk to you about:   How to prevent falling at " home   Hearing or vision tests   Memory testing   How to take your medicines safely   Making sure that you have the help and support you need at home  All topics are updated as new evidence becomes available and our peer review process is complete.  This topic retrieved from [employer] on: May 02, 2024.  Topic 603141 Version 1.0  Release: 32.4.3 - C32.122  © 2024 UpToDate, Inc[employer] and/or its affiliates. All rights reserved.  Consumer Information Use and Disclaimer   Disclaimer: This generalized information is a limited summary of diagnosis, treatment, and/or medication information. It is not meant to be comprehensive and should be used as a tool to help the user understand and/or assess potential diagnostic and treatment options. It does NOT include all information about conditions, treatments, medications, side effects, or risks that may apply to a specific patient. It is not intended to be medical advice or a substitute for the medical advice, diagnosis, or treatment of a health care provider based on the health care provider's examination and assessment of a patient's specific and unique circumstances. Patients must speak with a health care provider for complete information about their health, medical questions, and treatment options, including any risks or benefits regarding use of medications. This information does not endorse any treatments or medications as safe, effective, or approved for treating a specific patient. UpToDate, Inc. and its affiliates disclaim any warranty or liability relating to this information or the use thereof.The use of this information is governed by the Terms of Use, available at https://www.w[employer]tersDocSperauwer.com/en/know/clinical-effectiveness-terms. 2024© UpToDate, Inc. and its affiliates and/or licensors. All rights reserved.  Copyright   © 2024 Up[employer]Date, Inc. and/or its affiliates. All rights reserved.

## 2025-06-18 NOTE — TELEPHONE ENCOUNTER
----- Message from Monika Valdivia MD sent at 6/18/2025  7:14 AM EDT -----  Repeat colonoscopy in 7 years due to 1 tubular adenoma.  ----- Message -----  From: Lab, Background User  Sent: 6/12/2025   6:34 PM EDT  To: Monika Valdivia MD

## 2025-06-18 NOTE — TELEPHONE ENCOUNTER
Attempted to call pt regarding colonoscopy results and provider recommendations, however, I received voicemail. Advised to return call to office to discuss. 7 year colon recall set.    Please, relay results if pt returns call. Thanks!

## 2025-07-02 ENCOUNTER — TELEPHONE (OUTPATIENT)
Age: 60
End: 2025-07-02

## 2025-07-02 NOTE — TELEPHONE ENCOUNTER
Spoke with patient and offered to assist with any urgent need and/or help facilitate communication w/ her provider.   Patient declined offer, states she will wait, only will discuss with Irma.

## 2025-07-15 DIAGNOSIS — E66.812 OBESITY, CLASS II, BMI 35-39.9: ICD-10-CM

## 2025-07-16 RX ORDER — PHENTERMINE AND TOPIRAMATE EXTENDED-RELEASE 11.25; 69 MG/1; MG/1
1 CAPSULE ORAL
Qty: 30 CAPSULE | Refills: 3 | Status: SHIPPED | OUTPATIENT
Start: 2025-07-16 | End: 2025-07-18 | Stop reason: SDUPTHER

## 2025-07-18 DIAGNOSIS — E66.812 OBESITY, CLASS II, BMI 35-39.9: ICD-10-CM

## 2025-07-18 RX ORDER — PHENTERMINE AND TOPIRAMATE EXTENDED-RELEASE 11.25; 69 MG/1; MG/1
1 CAPSULE ORAL
Qty: 30 CAPSULE | Refills: 3 | Status: SHIPPED | OUTPATIENT
Start: 2025-07-18 | End: 2025-07-21 | Stop reason: SDUPTHER

## 2025-07-21 ENCOUNTER — TELEPHONE (OUTPATIENT)
Age: 60
End: 2025-07-21

## 2025-07-21 DIAGNOSIS — E66.812 OBESITY, CLASS II, BMI 35-39.9: ICD-10-CM

## 2025-07-21 RX ORDER — PHENTERMINE AND TOPIRAMATE 11.25; 69 MG/1; MG/1
1 CAPSULE, EXTENDED RELEASE ORAL
Qty: 30 CAPSULE | Refills: 0 | Status: SHIPPED | OUTPATIENT
Start: 2025-07-21

## 2025-07-21 NOTE — TELEPHONE ENCOUNTER
The patient called she wants to speak with the covering physician for Dr Irene   she would not give me a reason for the call    thank you

## 2025-08-11 ENCOUNTER — ANNUAL EXAM (OUTPATIENT)
Age: 60
End: 2025-08-11
Payer: COMMERCIAL

## 2025-08-21 ENCOUNTER — HOSPITAL ENCOUNTER (OUTPATIENT)
Dept: RADIOLOGY | Facility: HOSPITAL | Age: 60
Discharge: HOME | End: 2025-08-21
Attending: PHYSICIAN ASSISTANT
Payer: COMMERCIAL

## 2025-08-21 DIAGNOSIS — N93.0 PCB (POST COITAL BLEEDING): ICD-10-CM

## 2025-08-21 PROCEDURE — 76830 TRANSVAGINAL US NON-OB: CPT

## 2025-08-21 PROCEDURE — 76856 US EXAM PELVIC COMPLETE: CPT

## (undated) DEVICE — LUBRICANT SURGILUBE TUBE 4 OZ  FLIP TOP

## (undated) DEVICE — GAUZE SPONGES,16 PLY: Brand: CURITY

## (undated) DEVICE — AIRLIFE™  ADULT CUSHION NASAL CANNULA WITH 7 FOOT (2.1 M) CRUSH-RESISTANT OXYGEN TUBING, AND U/CONNECT-IT ADAPTER: Brand: AIRLIFE™

## (undated) DEVICE — GLOVE SRG BIOGEL ECLIPSE 7.5

## (undated) DEVICE — BASKET SPECIMEN RETRIVAL 1.9FR 120CM

## (undated) DEVICE — PACK TUR

## (undated) DEVICE — GLOVE EXAM NON-STRL NTRL PLUS LRG PF

## (undated) DEVICE — CHLORHEXIDINE 4PCT 4 OZ

## (undated) DEVICE — CATH URET .038 10FR 50CM DUAL LUMEN

## (undated) DEVICE — SINGLE-USE BIOPSY FORCEPS: Brand: RADIAL JAW 4

## (undated) DEVICE — BRUSH ENDO CLEANING DBL-HEADER

## (undated) DEVICE — GUIDEWIRE STRGHT TIP 0.035 IN  SOLO PLUS

## (undated) DEVICE — MEDI-VAC YANKAUER SUCTION HANDLE: Brand: CARDINAL HEALTH

## (undated) DEVICE — TUBING AUX CHANNEL

## (undated) DEVICE — DISPOSABLE BIOPSY VALVE MAJ-1555: Brand: SINGLE USE BIOPSY VALVE (STERILE)

## (undated) DEVICE — PREMIUM DRY TRAY LF: Brand: MEDLINE INDUSTRIES, INC.

## (undated) DEVICE — SHEATH URETERAL ACCESS 10/12FR 35CM PROXIS

## (undated) DEVICE — "MAJ-901 WATER CONTAINER SET CV-160/140": Brand: WATER CONTAINER

## (undated) DEVICE — STERILE SURGICAL LUBRICANT,  TUBE: Brand: SURGILUBE

## (undated) DEVICE — UROCATCH BAG

## (undated) DEVICE — "MH-443 SUCTION VALVE F/EVIS140 EVIS160": Brand: SUCTION VALVE

## (undated) DEVICE — FIBER HOLMIUM MP 200 MICRON SMARTSCOPE

## (undated) DEVICE — GUIDEWIRE ANGLED TIP 0.035 IN SOLO PLUS

## (undated) DEVICE — 1200CC GUARDIAN II: Brand: GUARDIAN

## (undated) DEVICE — SKIN MARKER DUAL TIP WITH RULER CAP, FLEXIBLE RULER AND LABELS: Brand: DEVON

## (undated) DEVICE — SOLIDIFIER FLUID WASTE CONTROL 1500ML

## (undated) DEVICE — "MH-438 A/W VLVE F/140 EVIS-140": Brand: AIR/WATER VALVE

## (undated) DEVICE — TUBING BUBBLE CLEAR 5MM X 100 FT NS